# Patient Record
Sex: MALE | Race: WHITE | NOT HISPANIC OR LATINO | Employment: OTHER | URBAN - NONMETROPOLITAN AREA
[De-identification: names, ages, dates, MRNs, and addresses within clinical notes are randomized per-mention and may not be internally consistent; named-entity substitution may affect disease eponyms.]

---

## 2017-01-03 ENCOUNTER — OFFICE VISIT (OUTPATIENT)
Dept: SURGERY | Facility: CLINIC | Age: 67
End: 2017-01-03

## 2017-01-03 VITALS
HEIGHT: 69 IN | SYSTOLIC BLOOD PRESSURE: 198 MMHG | DIASTOLIC BLOOD PRESSURE: 90 MMHG | BODY MASS INDEX: 34.21 KG/M2 | WEIGHT: 231 LBS

## 2017-01-03 DIAGNOSIS — R22.9 MASS OF SKIN: Primary | ICD-10-CM

## 2017-01-03 DIAGNOSIS — R22.9 MASS OF SKIN: ICD-10-CM

## 2017-01-03 PROCEDURE — 99202 OFFICE O/P NEW SF 15 MIN: CPT | Performed by: SURGERY

## 2017-01-03 PROCEDURE — 11402 EXC TR-EXT B9+MARG 1.1-2 CM: CPT | Performed by: SURGERY

## 2017-01-03 NOTE — PROGRESS NOTES
CHIEF COMPLAINT:    Skin cyst on chest    HISTORY OF PRESENT ILLNESS:    Kamlesh Moser is a 66 y.o. male who presents with an enlarging cyst on the mid-chest.  He has had it for more than three years.  It has required I and D previously for infection and appeared to have nearly resolved.  He has noticed it again for about the last 18 months.  It has gotten larger.  It causes no pain.  He would like it removed given his prior infection and its enlargement.    Past Medical History   Diagnosis Date   • Acute maxillary sinusitis    • Allergic rhinitis       Moderately severe      • Arthritis    • Arthritis    • Atopic conjunctivitis    • Benign prostatic hyperplasia    • Bilateral hearing loss    • Cellulitis      CHESTWAL   • Chronic otitis externa    • Common cold    • Diabetes    • Diverticular disease of colon    • Dyspnea    • Essential hypertension    • Fracture of pelvis    • Fracture of wrist    • Fracture, radius    • Fracture, ulna    • GERD (gastroesophageal reflux disease)    • GERD (gastroesophageal reflux disease)    • Hiatal hernia    • Hiatal hernia    • Hyperlipidemia    • Hyperlipidemia    • Hypertension    • Hypertension    • Hypertensive disorder    • Immunization due    • Inflamed seborrheic keratosis    • Influenza vaccination given      Influenza vaccination   12/10/2013   • Low back pain    • Need for immunization against influenza    • Need for prophylactic vaccination against Streptococcus pneumoniae (pneumococcus)    • Need for prophylactic vaccination with combined diphtheria-tetanus-pertussis (DTP) vaccine      Need for prophylactic vaccination and inoculation against Diptheria-tetanus-pertussis, combined [DTP] [DTaP]     • Nuclear senile cataract      moderate   • Onychomycosis    • Osteoarthritis of multiple joints    • Primary open angle glaucoma      Right eye IOP too high s/p LT      • Rhinitis    • Screening for malignant neoplasm of colon    • Screening for malignant neoplasm of  prostate    • Shoulder pain    • Temporomandibular joint disorder    • Vitamin D deficiency        Past Surgical History   Procedure Laterality Date   • Knee surgery     • Back surgery     • Wrist surgery     • Cardiac catheterization  02/08/2011     Symptoms of angina that have resolved with normalization of blood pressure. Very mild nonobstructive coronary artery disease. Normal systolic function.   • Endoscopy and colonoscopy  01/16/2012     x2, Diverticulosis in sigmoid colon. Hemorrhoids in anus.   • Cryotherapy  06/07/2013     CRYOTHERAPY OF ACNE   • Endoscopy w/ peg tube placement  04/12/2002     EGD w/ tube: Dysphagia, Esophagitis, Hiatal hernia, Duodenitis.   • Other surgical history  09/11/1996     Forearm or wrist surgery: x2, Removal of K-wires, left radius    • Incision and drainage abscess  04/10/2013     I&D, Simple    • Other surgical history  04/25/1986     Hand/finger surgery: Repair radial collateral ligament, I&D, right fifth finger. Repair laceration.   • Injection of medication  05/25/2016     Kenalog x3   • Knee surgery  05/12/2003     x3, Arthroscopic medial meniscectomy, right knee.   • Refractive surgery  12/29/2014     LASER SURGERY OF EYE: x2   • Back surgery  1982     Low back disk surgery    • Proctosigmoidoscopy  04/23/1982     Proctosigmoidoscopy, Rigid: Normal         Current Outpatient Prescriptions:   •  acetic acid-hydrocortisone (VOSOL-HC) 1-2 % otic solution, 2 (two) times a day., Disp: , Rfl:   •  aspirin 325 MG tablet, Take 325 mg by mouth daily., Disp: , Rfl:   •  AZOPT 1 % ophthalmic suspension, Administer 1 drop to both eyes 3 (Three) Times a Day., Disp: 10 mL, Rfl: 5  •  finasteride (PROSCAR) 5 MG tablet, Take 1 tablet by mouth Daily., Disp: 30 tablet, Rfl: 3  •  fluticasone (FLONASE) 50 MCG/ACT nasal spray, 2 sprays into each nostril Daily., Disp: , Rfl:   •  montelukast (SINGULAIR) 10 MG tablet, TAKE 1 TABLET EVERY NIGHT AT BEDTIME., Disp: 30 tablet, Rfl: 5  •   omeprazole (priLOSEC) 20 MG capsule, , Disp: , Rfl:   •  pravastatin (PRAVACHOL) 40 MG tablet, , Disp: , Rfl:   •  tamsulosin (FLOMAX) 0.4 MG capsule 24 hr capsule, TAKE 1 CAPSULE DAILY, Disp: 30 capsule, Rfl: 7  •  triamterene-hydrochlorothiazide (MAXZIDE-25) 37.5-25 MG per tablet, , Disp: , Rfl:     Allergies   Allergen Reactions   • Penicillins        Family History   Problem Relation Age of Onset   • Diabetes Mother    • Heart disease Mother    • Diabetes Father    • Heart disease Father    • Diabetes Other    • Heart disease Other    • Hypertension Other        Social History     Social History   • Marital status:      Spouse name: N/A   • Number of children: N/A   • Years of education: N/A     Occupational History   • Not on file.     Social History Main Topics   • Smoking status: Former Smoker   • Smokeless tobacco: Never Used   • Alcohol use Yes      Comment: social   • Drug use: No   • Sexual activity: Defer     Other Topics Concern   • Not on file     Social History Narrative       Review of Systems   Constitutional: Negative for activity change, appetite change, chills and fever.   HENT: Negative for hearing loss, nosebleeds and trouble swallowing.    Cardiovascular: Negative for chest pain, palpitations and leg swelling.   Gastrointestinal: Negative for abdominal distention, abdominal pain, anal bleeding, blood in stool, constipation, diarrhea, nausea, rectal pain and vomiting.   Endocrine: Negative for cold intolerance, heat intolerance, polydipsia and polyuria.   Genitourinary: Negative for decreased urine volume, difficulty urinating, dysuria, enuresis, frequency, hematuria and urgency.   Musculoskeletal: Negative for arthralgias, back pain, gait problem, myalgias and neck pain.   Skin: Negative for pallor, rash and wound.   Allergic/Immunologic: Negative for immunocompromised state.   Neurological: Negative for dizziness, seizures, weakness, light-headedness, numbness and headaches.  "  Psychiatric/Behavioral: Negative for agitation and behavioral problems. The patient is not nervous/anxious.        Objective     Visit Vitals   • BP (!) 198/90   • Ht 69\" (175.3 cm)   • Wt 231 lb (105 kg)   • BMI 34.11 kg/m2       Physical Exam   Constitutional: He is oriented to person, place, and time. He appears well-developed and well-nourished.   HENT:   Head: Normocephalic and atraumatic.   Nose: Nose normal.   Eyes: Conjunctivae and EOM are normal. Right eye exhibits no discharge. Left eye exhibits no discharge.   Neck: Trachea normal, normal range of motion and phonation normal. Neck supple. No JVD present. No tracheal deviation and no edema present. No thyromegaly present.   Cardiovascular: Normal rate, regular rhythm and normal heart sounds.  Exam reveals no gallop and no friction rub.    No murmur heard.  Pulmonary/Chest: Effort normal and breath sounds normal. No accessory muscle usage. No respiratory distress. He has no decreased breath sounds. He has no wheezes. He has no rales. He exhibits no tenderness.       Abdominal: Soft. He exhibits no distension, no fluid wave, no ascites, no pulsatile midline mass and no mass. There is no tenderness. There is no rebound and no guarding. No hernia.   Musculoskeletal: Normal range of motion. He exhibits no edema, tenderness or deformity.   Lymphadenopathy:     He has no cervical adenopathy.        Left: No supraclavicular adenopathy present.   Neurological: He is alert and oriented to person, place, and time. He has normal strength. No cranial nerve deficit.   Skin: Skin is warm and dry. No rash noted. He is not diaphoretic. No erythema. No pallor.   Psychiatric: He has a normal mood and affect. His speech is normal and behavior is normal. Judgment and thought content normal. Cognition and memory are normal.   Vitals reviewed.      Procedure     Procedure Performed: Removal of 2cm Skin Cyst from Midchest  Preop Dx: Sebaceous Cyst  Postop Dx: Same  Surgeon: " Mary Kay  Assistant: ASTRID Chavarria  EBL: <5cc  Specimen: Skin Cyst    Note: Site was identified by the patient.  Consent was obtained.  Timeout was performed.  The area was prepped and draped in normal sterile fashion.  Local anesthetic was injected in a field block. Elliptical skin incision was made overlying the cyst.  Full-thickness skin was then removed and the cyst wall was dissected free of the surrounding tissue and removed with it.  Hemostasis was achieved.  The skin was then closed.  Sterile dressings were placed.      ASSESSMENT:    Sebacoues Cyst    PLAN:    Cyst was removed today.  Will see in one week to recheck incision site.

## 2017-01-03 NOTE — MR AVS SNAPSHOT
Kamlesh Moser   1/3/2017 10:30 AM   Office Visit    Dept Phone:  [phone]   Encounter #:  [id number]    Provider:  Elias Muñiz MD   Department:  [facility] GENERAL SURGERY                Your Full Care Plan              Your Updated Medication List          This list is accurate as of: 1/3/17 11:22 AM.  Always use your most recent med list.                acetic acid-hydrocortisone 1-2 % otic solution   Commonly known as:  VOSOL-HC       aspirin 325 MG tablet       AZOPT 1 % ophthalmic suspension   Generic drug:  brinzolamide   Administer 1 drop to both eyes 3 (Three) Times a Day.       finasteride 5 MG tablet   Commonly known as:  PROSCAR   Take 1 tablet by mouth Daily.       fluticasone 50 MCG/ACT nasal spray   Commonly known as:  FLONASE       montelukast 10 MG tablet   Commonly known as:  SINGULAIR   TAKE 1 TABLET EVERY NIGHT AT BEDTIME.       omeprazole 20 MG capsule   Commonly known as:  priLOSEC       pravastatin 40 MG tablet   Commonly known as:  PRAVACHOL       tamsulosin 0.4 MG capsule 24 hr capsule   Commonly known as:  FLOMAX   TAKE 1 CAPSULE DAILY       triamterene-hydrochlorothiazide 37.5-25 MG per tablet   Commonly known as:  MAXZIDE-25               Instructions     None    Patient Instructions History      Upcoming Appointments     Visit Type Date Time Department    NEW PATIENT 1/3/2017 10:30 AM [facility] [place] [facility]    NEW PATIENT 2/14/2017 10:15 AM [facility] GASTROENT  [facility]    OFFICE VISIT 3/14/2017 10:30 AM [facility] [place] [facility]    OFFICE VISIT 4/12/2017  9:00 AM [facility] [place] [facility]    OFFICE VISIT 6/16/2017  8:00 AM [facility] MED [facility] 4TH      MyChart Signup     Our records indicate that you have declined [facility] signup. If you would like to sign up for [employer], please email [facility] [email or web address] or call [phone] to obtain an activation code.             Other Info from Your Visit           Your Appointments     "Feb 14, 2017 10:15 AM CST   New Patient with CHELY Chowdhury   [facility] GASTROENTEROLOGY (--)    [address] [place] [address] [address]  [facility] [address] [place] [id number] [phone]           Bring all previous medical records and films, along with current medications and insurance information.            Mar 14, 2017 10:30 AM CDT   Office Visit with Mack Yeager MD   [facility] OTOLARYNGOLOGY (--)    [address] [place] [address] [address]  [facility] [address] [place] [id number] [phone]           Arrive 15 minutes prior to appointment.            Apr 12, 2017  9:00 AM CDT   Office Visit with Alan Deluca MD   [facility] OPHTHALMOLOGY (--)    [address] [place] [address] [address]  [facility] [address] [place] [id number] [phone]           Arrive 15 minutes prior to appointment.            Jun 16, 2017  8:00 AM CDT   Office Visit with Rafa Vargas MD   [facility] FAMILY MEDICINE (--)    [address] [place] [address] [address]  [facility] [address] [place] [id number] [phone]           Arrive 15 minutes prior to appointment.              Allergies     Penicillins        Reason for Visit     Cyst Cyst on chest      Vital Signs     Blood Pressure Height Weight Body Mass Index Smoking Status       198/90 69\" (175.3 cm) 231 lb (105 kg) 34.11 kg/m2 Former Smoker         "

## 2017-01-04 ENCOUNTER — HOSPITAL ENCOUNTER (OUTPATIENT)
Dept: OTHER | Facility: HOSPITAL | Age: 67
Discharge: HOME | End: 2017-01-04
Attending: FAMILY MEDICINE | Admitting: FAMILY MEDICINE

## 2017-01-10 ENCOUNTER — OFFICE VISIT (OUTPATIENT)
Dept: SURGERY | Facility: CLINIC | Age: 67
End: 2017-01-10

## 2017-01-10 VITALS
SYSTOLIC BLOOD PRESSURE: 160 MMHG | BODY MASS INDEX: 33.92 KG/M2 | DIASTOLIC BLOOD PRESSURE: 92 MMHG | HEIGHT: 69 IN | WEIGHT: 229 LBS

## 2017-01-10 DIAGNOSIS — Z09 FOLLOW UP: Primary | ICD-10-CM

## 2017-01-10 PROCEDURE — 99024 POSTOP FOLLOW-UP VISIT: CPT | Performed by: SURGERY

## 2017-01-11 RX ORDER — MELOXICAM 15 MG/1
TABLET ORAL
Qty: 30 TABLET | Refills: 2 | Status: SHIPPED | OUTPATIENT
Start: 2017-01-11 | End: 2017-02-10 | Stop reason: SDUPTHER

## 2017-01-12 NOTE — PROGRESS NOTES
CHIEF COMPLAINT:    Chief Complaint   Patient presents with   • Follow-up     1 wk. ben- S/P ec. of cyst on chest       HISTORY OF PRESENT ILLNESS:    Kamlesh Moser is a 66 y.o. male who underwent cyst excision from his chest 1 week ago.  Pathology showed a cyst. He has no complaints today.    EXAM:    Well healed chest incision with minimal bruising.    ASSESSMENT:    S/p Cyst excision    PLAN:    Follow up prn.

## 2017-01-23 RX ORDER — OMEPRAZOLE 20 MG/1
CAPSULE, DELAYED RELEASE ORAL
Qty: 30 CAPSULE | Refills: 10 | Status: SHIPPED | OUTPATIENT
Start: 2017-01-23 | End: 2018-01-31 | Stop reason: SDUPTHER

## 2017-02-10 RX ORDER — MELOXICAM 15 MG/1
TABLET ORAL
Qty: 30 TABLET | Refills: 0 | Status: SHIPPED | OUTPATIENT
Start: 2017-02-10 | End: 2017-04-24 | Stop reason: SDUPTHER

## 2017-03-14 ENCOUNTER — OFFICE VISIT (OUTPATIENT)
Dept: OTOLARYNGOLOGY | Facility: CLINIC | Age: 67
End: 2017-03-14

## 2017-03-14 VITALS — BODY MASS INDEX: 34.8 KG/M2 | HEIGHT: 69 IN | WEIGHT: 235 LBS | TEMPERATURE: 98.5 F

## 2017-03-14 DIAGNOSIS — H92.02 OTALGIA, LEFT: ICD-10-CM

## 2017-03-14 DIAGNOSIS — H60.63 CHRONIC NON-INFECTIVE OTITIS EXTERNA OF BOTH EARS, UNSPECIFIED TYPE: Primary | ICD-10-CM

## 2017-03-14 PROCEDURE — 99214 OFFICE O/P EST MOD 30 MIN: CPT | Performed by: OTOLARYNGOLOGY

## 2017-03-14 RX ORDER — FLUOCINOLONE ACETONIDE 0.1 MG/ML
SOLUTION TOPICAL 2 TIMES DAILY
Qty: 90 ML | Refills: 0 | Status: SHIPPED | OUTPATIENT
Start: 2017-03-14 | End: 2019-12-18

## 2017-03-14 NOTE — PROGRESS NOTES
Subjective   Kamlesh Moser is a 66 y.o. male.       History of Present Illness   Patient wears hearing aids.  Also has chronic otitis externa.  Has chronic allergic rhinitis which is managed with Singulair and Dr. Vargas prescribes this for him.  Reports his ears have been itching but he uses Synalar as needed and this seems to help.  Complains of left-sided ear pain that is made worse when he sleeps at night because he sleeps on his left side.  No otorrhea.      The following portions of the patient's history were reviewed and updated as appropriate: allergies, current medications, past family history, past medical history, past social history, past surgical history and problem list.     reports that he has quit smoking. He has never used smokeless tobacco. He reports that he drinks alcohol. He reports that he does not use illicit drugs.   Patient is not a tobacco user and has not been counseled for use of tobacco products      Review of Systems   Constitutional: Negative for fever.   HENT: Positive for ear pain.            Objective   Physical Exam  Ears: External ears no deformity.  Canals show modest amount of squamous debris that is cleaned under the microscope using cerumen loops and suction.  Tympanic membranes are intact and clear.  Nose: Nares show no discharge mass polyp or purulence.  Boggy mucosa is present.  No gross external deformity.   Oral cavity: Lips and gums without lesions.  Tongue and floor of mouth without lesions.  Parotid and submandibular ducts unobstructed.  No mucosal lesions on the buccal mucosa or vestibule of the mouth.  Pharynx: No erythema or exudate  Neck: No lymphadenopathy.  No thyromegaly.  Trachea and larynx midline.  No masses in the parotid or submandibular glands.  Left TMJ is tender to palpation    Assessment/Plan   Kamlesh was seen today for follow-up and earache.    Diagnoses and all orders for this visit:    Chronic non-infective otitis externa of both ears,  unspecified type    Otalgia, left        Plan: Ears cleaned as described above.    Prescription for Synalar sent electronically to his pharmacy.  Avoid manipulation of his ears.  Explain that his otalgia was related to inflammation of his temporomandibular joint due to positioning during sleep.  Advised soft diet, use of anti-inflammatory medicines if not medically contraindicated, and attempting to position himself differently to avoid pressure on his jaw while sleeping.  Return in a year or as needed for problems.

## 2017-04-18 ENCOUNTER — OFFICE VISIT (OUTPATIENT)
Dept: OPHTHALMOLOGY | Facility: CLINIC | Age: 67
End: 2017-04-18

## 2017-04-18 DIAGNOSIS — H40.1131 PRIMARY OPEN ANGLE GLAUCOMA OF BOTH EYES, MILD STAGE: Primary | ICD-10-CM

## 2017-04-18 DIAGNOSIS — H52.203 ASTIGMATISM, BILATERAL: ICD-10-CM

## 2017-04-18 DIAGNOSIS — H26.9 CATARACT: ICD-10-CM

## 2017-04-18 PROCEDURE — 92133 CPTRZD OPH DX IMG PST SGM ON: CPT | Performed by: OPHTHALMOLOGY

## 2017-04-18 PROCEDURE — 99214 OFFICE O/P EST MOD 30 MIN: CPT | Performed by: OPHTHALMOLOGY

## 2017-04-18 PROCEDURE — 76514 ECHO EXAM OF EYE THICKNESS: CPT | Performed by: OPHTHALMOLOGY

## 2017-04-18 RX ORDER — BRINZOLAMIDE 1 %
1 SUSPENSION, DROPS(FINAL DOSAGE FORM)(ML) OPHTHALMIC (EYE) 3 TIMES DAILY
Qty: 10 ML | Refills: 5 | Status: SHIPPED | OUTPATIENT
Start: 2017-04-18 | End: 2022-04-28

## 2017-04-18 NOTE — PROGRESS NOTES
Subjective   Kamlesh Moser is a 66 y.o. male.   Chief Complaint   Patient presents with   • Cataract     Thanks vision is becoming worse in the right eye more so than the left eye.   • Glaucoma     Takes Azopt at 6 AM 1 PM and 9 PM.     HPI     Cataract    Additional comments: Thanks vision is becoming worse in the right eye more so than the left eye.           Glaucoma   In both eyes.  Treatment compliance is misses drops infrequently. Additional comments: Takes Azopt at 6 AM 1 PM and 9 PM.       Last edited by Elaine Lopez MD on 4/18/2017  9:45 AM. (History)          Review of Systems   Constitutional: Negative for chills and fever.   Respiratory: Negative for shortness of breath.    Cardiovascular: Negative for chest pain.   Gastrointestinal: Negative for abdominal pain.   Genitourinary: Negative for dysuria.   Musculoskeletal: Negative for myalgias.   Psychiatric/Behavioral: Negative for confusion.       Objective   Base Eye Exam     Visual Acuity (Snellen - Linear)      Right Left   Dist cc 20/50 -1 20/30 -2   Near cc J2 J1+       Correction:  Glasses      Tonometry (Applanation, 9:38 AM)      Right Left   Pressure 16 14         Pachymetry (4/18/2017)      Right Left   Thickness 504 506         Pupils      Pupils   Right PERRL   Left PERRL         Visual Fields      Left Right   Result Full          Extraocular Movement      Right Left   Result Full Full         Neuro/Psych     Oriented x3:  Yes      Dilation     Both eyes:  1.0% Mydriacyl, 2.5% Lonnie Synephrine @ 9:42 AM            Slit Lamp and Fundus Exam     External Exam      Right Left    External Normal Normal      Slit Lamp Exam      Right Left    Lids/Lashes Dermatochalasis - upper lid Dermatochalasis - upper lid    Conjunctiva/Sclera White and quiet White and quiet    Cornea arcus arcus    Anterior Chamber Deep and quiet Deep and quiet    Iris Round and reactive Round and reactive    Lens Nuclear sclerosis Nuclear sclerosis    Vitreous  Normal Normal      Fundus Exam      Right Left    Disc Peripapillary atrophy Peripapillary atrophy    C/D Ratio 0.5-0.55 0.4-0.45    Macula Normal Normal    Vessels Normal Normal    Periphery Normal Normal            Refraction     Wearing Rx      Sphere Cylinder Axis Add   Right -1.50 +0.75 169 +2.75   Left Dale +0.75 009 +2.75               OCT Disc:   OD- superior and inferior wedge thinning total RNFL thickness wnl  OS- normal    Assessment/Plan   Diagnoses and all orders for this visit:    Primary open angle glaucoma of both eyes, mild stage  Comments:  Continue AzoptTID OU. ?Preperimetric follow up in 3 months for OCT of the ON HVF and IOP check.     Cataract  Comments:  Continue to observe. No visual complaints.     Astigmatism, bilateral  Comments:  Continue current Rx    Other orders  -     AZOPT 1 % ophthalmic suspension; Administer 1 drop to both eyes 3 (Three) Times a Day.    Plan:       Return in about 3 months (around 7/18/2017) for IOP check, OCT discs, Visual Field 24-2.                            This document has been signed by Elaine Lopez MD on April 18, 2017 10:50 AM

## 2017-04-20 RX ORDER — MONTELUKAST SODIUM 10 MG/1
10 TABLET ORAL NIGHTLY
Qty: 30 TABLET | Refills: 5 | Status: SHIPPED | OUTPATIENT
Start: 2017-04-20 | End: 2017-10-23 | Stop reason: SDUPTHER

## 2017-04-24 ENCOUNTER — TELEPHONE (OUTPATIENT)
Dept: FAMILY MEDICINE CLINIC | Facility: CLINIC | Age: 67
End: 2017-04-24

## 2017-04-24 RX ORDER — MELOXICAM 15 MG/1
15 TABLET ORAL DAILY
Qty: 30 TABLET | Refills: 3 | Status: SHIPPED | OUTPATIENT
Start: 2017-04-24 | End: 2017-08-18 | Stop reason: SDUPTHER

## 2017-04-24 NOTE — TELEPHONE ENCOUNTER
-Requested Refills Sent    ---- Message from Evelin Rangel sent at 4/24/2017  9:12 AM CDT -----  Regarding: NICOLE GONZALES  Contact: [phone]  ANTONIETTA GONCALVES IS NEEDING REFILL ON MOBIC TO BE SENT TO [facility]

## 2017-06-16 ENCOUNTER — TELEPHONE (OUTPATIENT)
Dept: FAMILY MEDICINE CLINIC | Facility: CLINIC | Age: 67
End: 2017-06-16

## 2017-06-16 ENCOUNTER — LAB (OUTPATIENT)
Dept: LAB | Facility: HOSPITAL | Age: 67
End: 2017-06-16

## 2017-06-16 ENCOUNTER — OFFICE VISIT (OUTPATIENT)
Dept: FAMILY MEDICINE CLINIC | Facility: CLINIC | Age: 67
End: 2017-06-16

## 2017-06-16 VITALS
HEART RATE: 98 BPM | HEIGHT: 69 IN | BODY MASS INDEX: 33.72 KG/M2 | OXYGEN SATURATION: 99 % | WEIGHT: 227.7 LBS | SYSTOLIC BLOOD PRESSURE: 148 MMHG | DIASTOLIC BLOOD PRESSURE: 90 MMHG

## 2017-06-16 DIAGNOSIS — K21.9 GASTROESOPHAGEAL REFLUX DISEASE WITHOUT ESOPHAGITIS: ICD-10-CM

## 2017-06-16 DIAGNOSIS — M79.605 PAIN OF LEFT LOWER EXTREMITY: Primary | ICD-10-CM

## 2017-06-16 DIAGNOSIS — Z79.899 HIGH RISK MEDICATION USE: ICD-10-CM

## 2017-06-16 DIAGNOSIS — H65.06 RECURRENT ACUTE SEROUS OTITIS MEDIA OF BOTH EARS: ICD-10-CM

## 2017-06-16 DIAGNOSIS — E78.00 PURE HYPERCHOLESTEROLEMIA: ICD-10-CM

## 2017-06-16 DIAGNOSIS — I10 ESSENTIAL HYPERTENSION: ICD-10-CM

## 2017-06-16 DIAGNOSIS — R74.01 ELEVATED AST (SGOT): Primary | ICD-10-CM

## 2017-06-16 LAB
ALBUMIN SERPL-MCNC: 4.3 G/DL (ref 3.4–4.8)
ALBUMIN/GLOB SERPL: 1.4 G/DL (ref 1.1–1.8)
ALP SERPL-CCNC: 68 U/L (ref 38–126)
ALT SERPL W P-5'-P-CCNC: 62 U/L (ref 21–72)
ANION GAP SERPL CALCULATED.3IONS-SCNC: 12 MMOL/L (ref 5–15)
ARTICHOKE IGE QN: 82 MG/DL (ref 1–129)
AST SERPL-CCNC: 65 U/L (ref 17–59)
BILIRUB SERPL-MCNC: 0.5 MG/DL (ref 0.2–1.3)
BUN BLD-MCNC: 16 MG/DL (ref 7–21)
BUN/CREAT SERPL: 15.5 (ref 7–25)
CALCIUM SPEC-SCNC: 9.3 MG/DL (ref 8.4–10.2)
CHLORIDE SERPL-SCNC: 99 MMOL/L (ref 95–110)
CHOLEST SERPL-MCNC: 156 MG/DL (ref 0–199)
CO2 SERPL-SCNC: 26 MMOL/L (ref 22–31)
CREAT BLD-MCNC: 1.03 MG/DL (ref 0.7–1.3)
GFR SERPL CREATININE-BSD FRML MDRD: 72 ML/MIN/1.73 (ref 49–113)
GLOBULIN UR ELPH-MCNC: 3.1 GM/DL (ref 2.3–3.5)
GLUCOSE BLD-MCNC: 91 MG/DL (ref 60–100)
HDLC SERPL-MCNC: 50 MG/DL (ref 60–200)
LDLC/HDLC SERPL: 1.19 {RATIO} (ref 0–3.55)
MAGNESIUM SERPL-MCNC: 2.1 MG/DL (ref 1.6–2.3)
POTASSIUM BLD-SCNC: 4 MMOL/L (ref 3.5–5.1)
PROT SERPL-MCNC: 7.4 G/DL (ref 6.3–8.6)
SODIUM BLD-SCNC: 137 MMOL/L (ref 137–145)
TRIGL SERPL-MCNC: 232 MG/DL (ref 20–199)
VIT B12 BLD-MCNC: 777 PG/ML (ref 239–931)

## 2017-06-16 PROCEDURE — 82607 VITAMIN B-12: CPT | Performed by: FAMILY MEDICINE

## 2017-06-16 PROCEDURE — 96372 THER/PROPH/DIAG INJ SC/IM: CPT | Performed by: FAMILY MEDICINE

## 2017-06-16 PROCEDURE — 99214 OFFICE O/P EST MOD 30 MIN: CPT | Performed by: FAMILY MEDICINE

## 2017-06-16 PROCEDURE — 83735 ASSAY OF MAGNESIUM: CPT | Performed by: FAMILY MEDICINE

## 2017-06-16 PROCEDURE — 36415 COLL VENOUS BLD VENIPUNCTURE: CPT | Performed by: FAMILY MEDICINE

## 2017-06-16 PROCEDURE — 80061 LIPID PANEL: CPT | Performed by: FAMILY MEDICINE

## 2017-06-16 PROCEDURE — 80053 COMPREHEN METABOLIC PANEL: CPT | Performed by: FAMILY MEDICINE

## 2017-06-16 RX ORDER — TRIAMCINOLONE ACETONIDE 40 MG/ML
80 INJECTION, SUSPENSION INTRA-ARTICULAR; INTRAMUSCULAR ONCE
Status: COMPLETED | OUTPATIENT
Start: 2017-06-16 | End: 2017-06-16

## 2017-06-16 RX ADMIN — TRIAMCINOLONE ACETONIDE 80 MG: 40 INJECTION, SUSPENSION INTRA-ARTICULAR; INTRAMUSCULAR at 08:30

## 2017-06-16 NOTE — PROGRESS NOTES
Subjective   Kamlesh Moser is a 66 y.o. male.     Hypertension   This is a chronic problem. The problem has been waxing and waning since onset. The problem is controlled (fatigue if BP lower). Pertinent negatives include no chest pain, orthopnea, palpitations, peripheral edema or PND.   Hyperlipidemia   This is a chronic problem. The problem is controlled. Recent lipid tests were reviewed and are normal. Associated symptoms include myalgias. Pertinent negatives include no chest pain.   Heartburn   He complains of coughing. He reports no chest pain or no heartburn.   URI    This is a recurrent problem. The current episode started in the past 7 days. The problem has been waxing and waning. There has been no fever. Associated symptoms include congestion and coughing. Pertinent negatives include no chest pain, rash or rhinorrhea.   Leg Swelling   This is a new problem. The current episode started more than 1 month ago. The problem occurs constantly. The problem has been gradually improving. Associated symptoms include congestion, coughing and myalgias. Pertinent negatives include no chest pain or rash.        The following portions of the patient's history were reviewed and updated as appropriate: allergies, current medications, past family history, past medical history, past social history, past surgical history and problem list.    Review of Systems   HENT: Positive for congestion. Negative for rhinorrhea.    Eyes: Negative for discharge and itching.        Tre eye drops   Respiratory: Positive for cough.    Cardiovascular: Negative for chest pain, palpitations, orthopnea and PND.   Gastrointestinal: Negative for heartburn.   Musculoskeletal: Positive for myalgias.   Skin: Positive for wound. Negative for color change, pallor and rash.       Objective   Physical Exam   Constitutional: He is oriented to person, place, and time. He appears well-developed and well-nourished. No distress.   HENT:   Head:  Normocephalic and atraumatic.   Right Ear: Tympanic membrane is retracted. Tympanic membrane is not perforated and not erythematous.   Left Ear: Tympanic membrane is not perforated, not erythematous and not retracted.   Nose: Mucosal edema and rhinorrhea present.   Mouth/Throat: Uvula is midline, oropharynx is clear and moist and mucous membranes are normal.   Cardiovascular: Normal rate, regular rhythm, normal heart sounds and intact distal pulses.  Exam reveals no gallop and no friction rub.    No murmur heard.  Pulmonary/Chest: Effort normal and breath sounds normal. No respiratory distress. He has no wheezes. He has no rales. He exhibits no tenderness.   Musculoskeletal: He exhibits edema and tenderness.        Legs:  Lymphadenopathy:     He has no cervical adenopathy.   Neurological: He is alert and oriented to person, place, and time.   Skin: Skin is warm and dry. No lesion noted. He is not diaphoretic.   Psychiatric: He has a normal mood and affect. His behavior is normal. Judgment and thought content normal.   Nursing note and vitals reviewed.      Assessment/Plan   Problems Addressed this Visit        Cardiovascular and Mediastinum    Hyperlipidemia    Relevant Orders    Lipid Panel    Hypertensive disorder    Relevant Orders    Comprehensive Metabolic Panel       Digestive    GERD (gastroesophageal reflux disease)    Relevant Orders    Vitamin B12    Magnesium      Other Visit Diagnoses     Pain of left lower extremity    -  Primary    Relevant Orders    Duplex Venous Lower Extremity - Left CAR    Recurrent acute serous otitis media of both ears        High risk medication use        Relevant Orders    Vitamin B12    Magnesium               left leg swelling on mission trip left and gradually improving

## 2017-06-16 NOTE — PROGRESS NOTES
Pr Dr. Vargas, Mr. Moser has been called with his recent lab results & recommendations.  Continue his current medications and follow-up as planned or sooner if any problems.

## 2017-06-16 NOTE — PROGRESS NOTES
Okay of the AST is elevated on CMP.  Recommend also in the liver and hepatitis panel minuses hepatitis C.

## 2017-06-16 NOTE — TELEPHONE ENCOUNTER
Pr Dr. Vargas, Mr. Moser has been called with his recent lab results & recommendations.  Continue his current medications and follow-up as planned or sooner if any problems.      Lab and US of the Liver Ordered

## 2017-06-16 NOTE — TELEPHONE ENCOUNTER
----- Message from Rafa Vargas MD sent at 6/16/2017  1:25 PM CDT -----  Okay of the AST is elevated on CMP.  Recommend also in the liver and hepatitis panel minuses hepatitis C.

## 2017-06-20 ENCOUNTER — LAB (OUTPATIENT)
Dept: LAB | Facility: HOSPITAL | Age: 67
End: 2017-06-20

## 2017-06-20 DIAGNOSIS — R74.01 ELEVATED AST (SGOT): ICD-10-CM

## 2017-06-20 PROCEDURE — 80074 ACUTE HEPATITIS PANEL: CPT | Performed by: FAMILY MEDICINE

## 2017-06-20 PROCEDURE — 36415 COLL VENOUS BLD VENIPUNCTURE: CPT

## 2017-06-21 LAB
HAV IGM SERPL QL IA: NEGATIVE
HBV CORE IGM SERPL QL IA: NEGATIVE
HBV SURFACE AG SERPL QL IA: NEGATIVE
HCV AB SER DONR QL: NEGATIVE

## 2017-06-22 ENCOUNTER — TELEPHONE (OUTPATIENT)
Dept: FAMILY MEDICINE CLINIC | Facility: CLINIC | Age: 67
End: 2017-06-22

## 2017-06-22 NOTE — TELEPHONE ENCOUNTER
Spoke with patient. Relayed results and recommendations per Dr. Vargas.  Pt verbalized understanding.

## 2017-06-23 ENCOUNTER — HOSPITAL ENCOUNTER (OUTPATIENT)
Dept: ULTRASOUND IMAGING | Facility: HOSPITAL | Age: 67
Discharge: HOME | End: 2017-06-23
Attending: FAMILY MEDICINE | Admitting: FAMILY MEDICINE

## 2017-06-23 DIAGNOSIS — R74.01 ELEVATED AST (SGOT): ICD-10-CM

## 2017-06-23 PROBLEM — K76.0 FATTY LIVER: Status: ACTIVE | Noted: 2017-06-23

## 2017-06-23 PROBLEM — K80.20 GALLSTONES: Status: ACTIVE | Noted: 2017-06-23

## 2017-06-23 PROCEDURE — 76705 ECHO EXAM OF ABDOMEN: CPT

## 2017-06-23 NOTE — PROGRESS NOTES
He has a small cyst in his liver and his kidney both.  Also has fatty infiltration of the liver.  Also noted incidental gallstones.  At this point recommend weight loss and if he has problem with pain after eating in his right upper quadrant of his stomach let us know immediately.

## 2017-07-09 LAB
BH CV LOWER VASCULAR LEFT COMMON FEMORAL AUGMENT: NORMAL
BH CV LOWER VASCULAR LEFT COMMON FEMORAL COMPETENT: NORMAL
BH CV LOWER VASCULAR LEFT COMMON FEMORAL COMPRESS: NORMAL
BH CV LOWER VASCULAR LEFT DISTAL FEMORAL COMPRESS: NORMAL
BH CV LOWER VASCULAR LEFT GREATER SAPH AK AUGMENT: NORMAL
BH CV LOWER VASCULAR LEFT GREATER SAPH AK COMPETENT: NORMAL
BH CV LOWER VASCULAR LEFT GREATER SAPH AK COMPRESS: NORMAL
BH CV LOWER VASCULAR LEFT MID FEMORAL AUGMENT: NORMAL
BH CV LOWER VASCULAR LEFT MID FEMORAL COMPETENT: NORMAL
BH CV LOWER VASCULAR LEFT MID FEMORAL COMPRESS: NORMAL
BH CV LOWER VASCULAR LEFT PERONEAL AUGMENT: NORMAL
BH CV LOWER VASCULAR LEFT POPLITEAL AUGMENT: NORMAL
BH CV LOWER VASCULAR LEFT POPLITEAL COMPETENT: NORMAL
BH CV LOWER VASCULAR LEFT POPLITEAL COMPRESS: NORMAL
BH CV LOWER VASCULAR LEFT POSTERIOR TIBIAL AUGMENT: NORMAL
BH CV LOWER VASCULAR LEFT POSTERIOR TIBIAL COMPRESS: NORMAL
BH CV LOWER VASCULAR LEFT PROFUNDA FEMORAL AUGMENT: NORMAL
BH CV LOWER VASCULAR LEFT PROFUNDA FEMORAL COMPRESS: NORMAL
BH CV LOWER VASCULAR LEFT PROXIMAL FEMORAL AUGMENT: NORMAL
BH CV LOWER VASCULAR LEFT PROXIMAL FEMORAL COMPETENT: NORMAL
BH CV LOWER VASCULAR LEFT PROXIMAL FEMORAL COMPRESS: NORMAL

## 2017-07-17 ENCOUNTER — OFFICE VISIT (OUTPATIENT)
Dept: OPHTHALMOLOGY | Facility: CLINIC | Age: 67
End: 2017-07-17

## 2017-07-17 DIAGNOSIS — H26.9 CATARACT: ICD-10-CM

## 2017-07-17 DIAGNOSIS — H40.10X0 OPEN-ANGLE GLAUCOMA OF BOTH EYES, UNSPECIFIED GLAUCOMA STAGE, UNSPECIFIED OPEN-ANGLE GLAUCOMA TYPE: Primary | ICD-10-CM

## 2017-07-17 DIAGNOSIS — H40.003 BORDERLINE GLAUCOMA (GLAUCOMA SUSPECT), BILATERAL: Primary | ICD-10-CM

## 2017-07-17 PROCEDURE — 99212 OFFICE O/P EST SF 10 MIN: CPT | Performed by: OPHTHALMOLOGY

## 2017-07-17 PROCEDURE — 92083 EXTENDED VISUAL FIELD XM: CPT | Performed by: OPHTHALMOLOGY

## 2017-07-17 NOTE — PROGRESS NOTES
Subjective   Kamlesh Moser is a 66 y.o. male.   Chief Complaint   Patient presents with   • Glaucoma     Uses Azopt  TID OU  States he misses about 1 time per day (almost every day)  Here today for IOP check as well as HVF       HPI     Glaucoma   In both eyes. Additional comments: Uses Azopt  TID OU  States he misses about 1 time per day (almost every day)  Here today for IOP check as well as HVF             Comments   Last OCT 4/18/2016   OD: inferior and superior wedge thinning  OS: full        Last edited by Elaine Lopez MD on 7/17/2017  9:38 AM. (History)          Review of Systems   Constitutional: Negative for chills and fever.   Respiratory: Positive for shortness of breath.    Cardiovascular: Negative for chest pain.   Gastrointestinal: Negative for abdominal pain.   Genitourinary: Negative for dysuria.   Musculoskeletal: Negative for myalgias.   Psychiatric/Behavioral: Negative for confusion.       The following portions of the patient’s history were reviewed and updated as appropriate: current medications, allergies, past medical and surgical history and social history.       Objective   Base Eye Exam     Visual Acuity (Snellen - Linear)      Right Left   Dist cc 20/25 -1 20/25 -1       Correction:  Glasses      Tonometry (Applanation, 9:42 AM)      Right Left   Pressure 12 12         Pupils      Pupils   Right PERRL   Left PERRL         Visual Fields     See HVF      Extraocular Movement      Right Left   Result Full, Ortho Full, Ortho         Neuro/Psych     Oriented x3:  Yes    Mood/Affect:  Normal            Slit Lamp and Fundus Exam     External Exam      Right Left    External Normal Normal      Slit Lamp Exam      Right Left    Lids/Lashes Dermatochalasis - upper lid Dermatochalasis - upper lid    Conjunctiva/Sclera White and quiet White and quiet    Cornea arcus arcus    Anterior Chamber Deep and quiet Deep and quiet    Iris Round and reactive Round and reactive    Lens Nuclear  sclerosis Nuclear sclerosis    Vitreous Normal Normal            Refraction     Wearing Rx      Sphere Cylinder Axis Add   Right -1.25 +0.75 169 +2.75   Left +0.00 +0.50 011 +2.75                   Dior Visual Field :   OD- unreliable but full  OS- unreliable but kezia inferior spots       Assessment/Plan   Diagnoses and all orders for this visit:    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 Patient is on Azopt TID OU (uncertain of Tmax). OCT mild changes and HVF unreliable (risks +FH in glaucoma in father & thin corneas). Continue current regimen of drops (no refills needed). Can consider stopping drops and following closely.      Cataract  Comments:  Not visually significant. Observe.   Plan:       Return in about 4 months (around 11/17/2017).                            This document has been signed by Elaine Lopez MD on July 17, 2017 9:46 AM

## 2017-07-17 NOTE — PROGRESS NOTES
Subjective   Kamlesh Moser is a 66 y.o. male.   Chief Complaint   Patient presents with   • Cataract   • Glaucoma     HPI     Glaucoma   In both eyes.           Comments   3 Month vf 24-2 glaucoma       Last edited by Claudia Colbert on 7/17/2017  9:00 AM. (History)          Review of Systems   Constitutional: Negative for chills and fever.   Respiratory: Negative for shortness of breath.    Cardiovascular: Negative for chest pain.   Gastrointestinal: Negative for abdominal pain.   Genitourinary: Negative for dysuria.   Musculoskeletal: Negative for myalgias.   Psychiatric/Behavioral: Negative for confusion.       Objective   Base Eye Exam     Visual Acuity     Snellen - Linear            Refraction     Wearing Rx      Sphere Cylinder Axis Add   Right -1.25 +0.75 169 +2.75   Left +0.00 +0.50 011 +2.75                   Assessment/Plan   There are no diagnoses linked to this encounter.Plan:       No Follow-up on file.                            This document has been signed by Claudia Colbert on July 17, 2017 9:02 AM

## 2017-07-20 ENCOUNTER — OFFICE VISIT (OUTPATIENT)
Dept: FAMILY MEDICINE CLINIC | Facility: CLINIC | Age: 67
End: 2017-07-20

## 2017-07-20 VITALS
HEART RATE: 87 BPM | DIASTOLIC BLOOD PRESSURE: 82 MMHG | WEIGHT: 221.7 LBS | OXYGEN SATURATION: 99 % | HEIGHT: 69 IN | SYSTOLIC BLOOD PRESSURE: 140 MMHG | BODY MASS INDEX: 32.84 KG/M2

## 2017-07-20 DIAGNOSIS — Z00.00 INITIAL MEDICARE ANNUAL WELLNESS VISIT: ICD-10-CM

## 2017-07-20 DIAGNOSIS — H40.1121 PRIMARY OPEN ANGLE GLAUCOMA OF LEFT EYE, MILD STAGE: Primary | ICD-10-CM

## 2017-07-20 PROCEDURE — G0438 PPPS, INITIAL VISIT: HCPCS | Performed by: FAMILY MEDICINE

## 2017-07-20 NOTE — PROGRESS NOTES
QUICK REFERENCE INFORMATION:  The ABCs of the Annual Wellness Visit    Initial Medicare Wellness Visit    HEALTH RISK ASSESSMENT    1950    Recent Hospitalizations:  No hospitalization(s) within the last year..        Current Medical Providers:  Patient Care Team:  Rafa Vargas MD as PCP - General  Alan Deluca MD as PCP - Claims Attributed  Janes Burrows MD as Surgeon (Orthopedic Surgery)  Mack Yeager MD as Surgeon (Otolaryngology)        Smoking Status:  History   Smoking Status   • Former Smoker   Smokeless Tobacco   • Never Used       Alcohol Consumption:  History   Alcohol Use   • Yes     Comment: social       Depression Screen:   PHQ-9 Depression Screening 7/20/2017   Little interest or pleasure in doing things 0   Feeling down, depressed, or hopeless 0   Trouble falling or staying asleep, or sleeping too much 2   Feeling tired or having little energy 2   Poor appetite or overeating 0   Feeling bad about yourself - or that you are a failure or have let yourself or your family down 0   Trouble concentrating on things, such as reading the newspaper or watching television 0   Moving or speaking so slowly that other people could have noticed. Or the opposite - being so fidgety or restless that you have been moving around a lot more than usual 0   Thoughts that you would be better off dead, or of hurting yourself in some way 0   PHQ-9 Total Score 4   If you checked off any problems, how difficult have these problems made it for you to do your work, take care of things at home, or get along with other people? Somewhat difficult       Health Habits and Functional and Cognitive Screening:  Functional & Cognitive Status 7/20/2017   Do you have difficulty preparing food and eating? No   Do you have difficulty bathing yourself? No   Do you have difficulty getting dressed? No   Do you have difficulty using the toilet? No   Do you have difficulty moving around from place to place? No   In the  past year have you fallen or experienced a near fall? Yes   Do you need help using the phone?  No   Are you deaf or do you have serious difficulty hearing?  Yes   Do you need help with transportation? No   Do you need help shopping? No   Do you need help preparing meals?  No   Do you need help with housework?  Yes   Do you need help with laundry? No   Do you need help taking your medications? No   Do you need help managing money? No   Do you have difficulty concentrating, remembering or making decisions? No       Health Habits  Current Diet: Well Balanced Diet  Dental Exam: Not up to date  Eye Exam: Up to date  Exercise (times per week): 5 times per week  Current Exercise Activities Include: Walking          Does the patient have evidence of cognitive impairment? No    Asiprin use counseling: Taking ASA appropriately as indicated      Recent Lab Results:    Visual Acuity:  No exam data present    Age-appropriate Screening Schedule:  Refer to the list below for future screening recommendations based on patient's age, sex and/or medical conditions. Orders for these recommended tests are listed in the plan section. The patient has been provided with a written plan.    Health Maintenance   Topic Date Due   • INFLUENZA VACCINE  08/01/2017   • LIPID PANEL  06/16/2018   • COLONOSCOPY  01/16/2022   • TDAP/TD VACCINES (2 - Td) 06/19/2024   • PNEUMOCOCCAL VACCINES (65+ LOW/MEDIUM RISK)  Completed   • ZOSTER VACCINE  Completed   • DIABETIC FOOT EXAM  Excluded   • DIABETIC EYE EXAM  Excluded   • URINE MICROALBUMIN  Excluded        Subjective   History of Present Illness    Kamlesh Moser is a 66 y.o. male who presents for an Annual Wellness Visit.    The following portions of the patient's history were reviewed and updated as appropriate: allergies, current medications, past family history, past medical history, past social history, past surgical history and problem list.    Outpatient Medications Prior to Visit    Medication Sig Dispense Refill   • acetic acid-hydrocortisone (VOSOL-HC) 1-2 % otic solution 2 (two) times a day.     • aspirin 325 MG tablet Take 325 mg by mouth daily.     • AZOPT 1 % ophthalmic suspension Administer 1 drop to both eyes 3 (Three) Times a Day. 10 mL 5   • finasteride (PROSCAR) 5 MG tablet Take 1 tablet by mouth Daily. 30 tablet 3   • fluocinolone (SYNALAR) 0.01 % external solution Apply  topically 2 (Two) Times a Day. 3 drops each ear BID x7 Days then PRN 90 mL 0   • fluticasone (FLONASE) 50 MCG/ACT nasal spray 2 sprays into each nostril Daily.     • meloxicam (MOBIC) 15 MG tablet Take 1 tablet by mouth Daily. 30 tablet 3   • montelukast (SINGULAIR) 10 MG tablet Take 1 tablet by mouth Every Night. 30 tablet 5   • omeprazole (priLOSEC) 20 MG capsule TAKE 1 CAP DAILY FOR STOMACH. 30 capsule 10   • pravastatin (PRAVACHOL) 40 MG tablet      • tamsulosin (FLOMAX) 0.4 MG capsule 24 hr capsule TAKE 1 CAPSULE DAILY 30 capsule 7   • triamterene-hydrochlorothiazide (MAXZIDE-25) 37.5-25 MG per tablet        No facility-administered medications prior to visit.        Patient Active Problem List   Diagnosis   • Hiatal hernia   • Arthritis   • GERD (gastroesophageal reflux disease)   • Hyperlipidemia   • Hypertension   • Fracture of pelvis   • Primary osteoarthritis of both knees   • Primary open angle glaucoma of left eye, mild stage   • Cataract   • Vitamin D deficiency   • Temporomandibular joint disorder   • Osteoarthritis of multiple joints   • Fatty liver   • Gallstones       Advance Care Planning:  has NO advance directive - information provided to the patient today    Identification of Risk Factors:  Risk factors include: weight .    Review of Systems    Compared to one year ago, the patient feels his physical health is the same.  Compared to one year ago, the patient feels his mental health is the same.    Objective     Physical Exam    Vitals:    07/20/17 0802   BP: 140/82   Pulse: 87   SpO2: 99%  "  Weight: 221 lb 11.2 oz (101 kg)   Height: 69\" (175.3 cm)   PainSc: 0-No pain       Body mass index is 32.74 kg/(m^2).  Discussed the patient's BMI with him. The BMI is above average; BMI management plan is completed.    Assessment/Plan   Patient Self-Management and Personalized Health Advice  The patient has been provided with information about: diet and exercise and preventive services including:   · Advance directive.    Visit Diagnoses:  No diagnosis found.    No orders of the defined types were placed in this encounter.      Outpatient Encounter Prescriptions as of 7/20/2017   Medication Sig Dispense Refill   • acetic acid-hydrocortisone (VOSOL-HC) 1-2 % otic solution 2 (two) times a day.     • aspirin 325 MG tablet Take 325 mg by mouth daily.     • AZOPT 1 % ophthalmic suspension Administer 1 drop to both eyes 3 (Three) Times a Day. 10 mL 5   • finasteride (PROSCAR) 5 MG tablet Take 1 tablet by mouth Daily. 30 tablet 3   • fluocinolone (SYNALAR) 0.01 % external solution Apply  topically 2 (Two) Times a Day. 3 drops each ear BID x7 Days then PRN 90 mL 0   • fluticasone (FLONASE) 50 MCG/ACT nasal spray 2 sprays into each nostril Daily.     • meloxicam (MOBIC) 15 MG tablet Take 1 tablet by mouth Daily. 30 tablet 3   • montelukast (SINGULAIR) 10 MG tablet Take 1 tablet by mouth Every Night. 30 tablet 5   • omeprazole (priLOSEC) 20 MG capsule TAKE 1 CAP DAILY FOR STOMACH. 30 capsule 10   • pravastatin (PRAVACHOL) 40 MG tablet      • tamsulosin (FLOMAX) 0.4 MG capsule 24 hr capsule TAKE 1 CAPSULE DAILY 30 capsule 7   • triamterene-hydrochlorothiazide (MAXZIDE-25) 37.5-25 MG per tablet        No facility-administered encounter medications on file as of 7/20/2017.        Reviewed use of high risk medication in the elderly: yes  Reviewed for potential of harmful drug interactions in the elderly: yes    Follow Up:  No Follow-up on file.     An After Visit Summary and PPPS with all of these plans were given to the " patient.

## 2017-08-18 ENCOUNTER — TELEPHONE (OUTPATIENT)
Dept: FAMILY MEDICINE CLINIC | Facility: CLINIC | Age: 67
End: 2017-08-18

## 2017-08-18 RX ORDER — PRAVASTATIN SODIUM 40 MG
TABLET ORAL
Qty: 30 TABLET | Refills: 10 | Status: SHIPPED | OUTPATIENT
Start: 2017-08-18 | End: 2018-06-20 | Stop reason: SDUPTHER

## 2017-08-18 RX ORDER — MELOXICAM 15 MG/1
TABLET ORAL
Qty: 30 TABLET | Refills: 2 | Status: SHIPPED | OUTPATIENT
Start: 2017-08-18 | End: 2017-10-26 | Stop reason: SDUPTHER

## 2017-10-23 ENCOUNTER — TELEPHONE (OUTPATIENT)
Dept: FAMILY MEDICINE CLINIC | Facility: CLINIC | Age: 67
End: 2017-10-23

## 2017-10-23 RX ORDER — MONTELUKAST SODIUM 10 MG/1
10 TABLET ORAL NIGHTLY
Qty: 30 TABLET | Refills: 5 | Status: SHIPPED | OUTPATIENT
Start: 2017-10-23 | End: 2017-12-20 | Stop reason: SDUPTHER

## 2017-10-23 NOTE — TELEPHONE ENCOUNTER
Refill sent earlier in the day      ---- Message from Petra Bowling sent at 10/23/2017 11:47 AM CDT -----  Contact: [facility]  Alicia jimenez./   Needs a refill on his generic Singular 10 mg.

## 2017-10-26 RX ORDER — MELOXICAM 15 MG/1
TABLET ORAL
Qty: 30 TABLET | Refills: 1 | Status: SHIPPED | OUTPATIENT
Start: 2017-10-26 | End: 2017-12-20 | Stop reason: SDUPTHER

## 2017-10-26 NOTE — TELEPHONE ENCOUNTER
-Requested Refills Sent    ---- Message from Petra Bowling sent at 10/26/2017 11:05 AM CDT -----  NICOLE NUNEZ/ Message saying he needs his Mobic sent to [facility], not [facility].

## 2017-12-18 RX ORDER — MULTIVIT34/FOLIC AC/NADH/COQ10 1-5-50 MG
TABLET ORAL
Qty: 180 TABLET | OUTPATIENT
Start: 2017-12-18

## 2017-12-19 ENCOUNTER — TELEPHONE (OUTPATIENT)
Dept: FAMILY MEDICINE CLINIC | Facility: CLINIC | Age: 67
End: 2017-12-19

## 2017-12-20 ENCOUNTER — LAB (OUTPATIENT)
Dept: LAB | Facility: HOSPITAL | Age: 67
End: 2017-12-20

## 2017-12-20 ENCOUNTER — TELEPHONE (OUTPATIENT)
Dept: FAMILY MEDICINE CLINIC | Facility: CLINIC | Age: 67
End: 2017-12-20

## 2017-12-20 ENCOUNTER — OFFICE VISIT (OUTPATIENT)
Dept: FAMILY MEDICINE CLINIC | Facility: CLINIC | Age: 67
End: 2017-12-20

## 2017-12-20 VITALS
WEIGHT: 222.8 LBS | DIASTOLIC BLOOD PRESSURE: 82 MMHG | HEART RATE: 81 BPM | OXYGEN SATURATION: 99 % | BODY MASS INDEX: 33 KG/M2 | HEIGHT: 69 IN | SYSTOLIC BLOOD PRESSURE: 138 MMHG

## 2017-12-20 DIAGNOSIS — H91.8X3 OTHER SPECIFIED HEARING LOSS OF BOTH EARS: ICD-10-CM

## 2017-12-20 DIAGNOSIS — I10 ESSENTIAL HYPERTENSION: ICD-10-CM

## 2017-12-20 DIAGNOSIS — Z12.5 SCREENING FOR PROSTATE CANCER: ICD-10-CM

## 2017-12-20 DIAGNOSIS — K76.0 FATTY LIVER: ICD-10-CM

## 2017-12-20 DIAGNOSIS — E78.00 PURE HYPERCHOLESTEROLEMIA: Primary | ICD-10-CM

## 2017-12-20 DIAGNOSIS — K21.9 GASTROESOPHAGEAL REFLUX DISEASE WITHOUT ESOPHAGITIS: ICD-10-CM

## 2017-12-20 LAB
ALBUMIN SERPL-MCNC: 4.5 G/DL (ref 3.4–4.8)
ALBUMIN/GLOB SERPL: 1.6 G/DL (ref 1.1–1.8)
ALP SERPL-CCNC: 49 U/L (ref 38–126)
ALT SERPL W P-5'-P-CCNC: 67 U/L (ref 21–72)
ANION GAP SERPL CALCULATED.3IONS-SCNC: 11 MMOL/L (ref 5–15)
ARTICHOKE IGE QN: 86 MG/DL (ref 1–129)
AST SERPL-CCNC: 61 U/L (ref 17–59)
BILIRUB SERPL-MCNC: 0.6 MG/DL (ref 0.2–1.3)
BUN BLD-MCNC: 16 MG/DL (ref 7–21)
BUN/CREAT SERPL: 14.7 (ref 7–25)
CALCIUM SPEC-SCNC: 9.4 MG/DL (ref 8.4–10.2)
CHLORIDE SERPL-SCNC: 99 MMOL/L (ref 95–110)
CHOLEST SERPL-MCNC: 158 MG/DL (ref 0–199)
CO2 SERPL-SCNC: 26 MMOL/L (ref 22–31)
CREAT BLD-MCNC: 1.09 MG/DL (ref 0.7–1.3)
GFR SERPL CREATININE-BSD FRML MDRD: 67 ML/MIN/1.73 (ref 49–113)
GLOBULIN UR ELPH-MCNC: 2.8 GM/DL (ref 2.3–3.5)
GLUCOSE BLD-MCNC: 112 MG/DL (ref 60–100)
HDLC SERPL-MCNC: 49 MG/DL (ref 60–200)
LDLC/HDLC SERPL: 1.44 {RATIO} (ref 0–3.55)
MAGNESIUM SERPL-MCNC: 2 MG/DL (ref 1.6–2.3)
POTASSIUM BLD-SCNC: 4.1 MMOL/L (ref 3.5–5.1)
PROT SERPL-MCNC: 7.3 G/DL (ref 6.3–8.6)
PSA SERPL-MCNC: 0.31 NG/ML (ref 0–4)
SODIUM BLD-SCNC: 136 MMOL/L (ref 137–145)
TRIGL SERPL-MCNC: 191 MG/DL (ref 20–199)
VIT B12 BLD-MCNC: 762 PG/ML (ref 239–931)

## 2017-12-20 PROCEDURE — 82607 VITAMIN B-12: CPT | Performed by: FAMILY MEDICINE

## 2017-12-20 PROCEDURE — 36415 COLL VENOUS BLD VENIPUNCTURE: CPT | Performed by: FAMILY MEDICINE

## 2017-12-20 PROCEDURE — 83735 ASSAY OF MAGNESIUM: CPT | Performed by: FAMILY MEDICINE

## 2017-12-20 PROCEDURE — 99214 OFFICE O/P EST MOD 30 MIN: CPT | Performed by: FAMILY MEDICINE

## 2017-12-20 PROCEDURE — G0103 PSA SCREENING: HCPCS

## 2017-12-20 PROCEDURE — 80061 LIPID PANEL: CPT | Performed by: FAMILY MEDICINE

## 2017-12-20 PROCEDURE — 80053 COMPREHEN METABOLIC PANEL: CPT | Performed by: FAMILY MEDICINE

## 2017-12-20 RX ORDER — MELOXICAM 15 MG/1
15 TABLET ORAL DAILY
Qty: 30 TABLET | Refills: 1 | Status: SHIPPED | OUTPATIENT
Start: 2017-12-20 | End: 2018-03-09 | Stop reason: SDUPTHER

## 2017-12-20 RX ORDER — MONTELUKAST SODIUM 10 MG/1
10 TABLET ORAL NIGHTLY
Qty: 30 TABLET | Refills: 5 | Status: SHIPPED | OUTPATIENT
Start: 2017-12-20 | End: 2018-09-25 | Stop reason: SDUPTHER

## 2017-12-20 RX ORDER — FINASTERIDE 5 MG/1
5 TABLET, FILM COATED ORAL DAILY
Qty: 30 TABLET | Refills: 3 | Status: SHIPPED | OUTPATIENT
Start: 2017-12-20 | End: 2018-05-03 | Stop reason: SDUPTHER

## 2017-12-20 NOTE — PROGRESS NOTES
Subjective   Kamlesh Moser is a 67 y.o. male.     Hypertension   This is a chronic problem. The current episode started more than 1 year ago. The problem has been waxing and waning since onset. The problem is controlled (fatigue if BP lower). Associated symptoms include shortness of breath (ELIZABETH, black lung). Pertinent negatives include no chest pain, orthopnea, palpitations, peripheral edema or PND.   Hyperlipidemia   This is a chronic problem. The current episode started more than 1 year ago. The problem is controlled. Recent lipid tests were reviewed and are normal. Associated symptoms include shortness of breath (ELIZABETH, black lung). Pertinent negatives include no chest pain or myalgias.   Heartburn   He reports no chest pain or no heartburn.        The following portions of the patient's history were reviewed and updated as appropriate: allergies, current medications, past family history, past medical history, past social history, past surgical history and problem list.    Review of Systems   HENT: Positive for hearing loss. Negative for ear pain.    Eyes: Negative for discharge and itching.        Tre eye drops   Respiratory: Positive for shortness of breath (ELIZABETH, black lung).    Cardiovascular: Negative for chest pain, palpitations, orthopnea and PND.   Gastrointestinal: Negative for heartburn.   Musculoskeletal: Negative for myalgias.   Skin: Positive for wound. Negative for color change and pallor.       Objective   Physical Exam   Constitutional: He is oriented to person, place, and time. He appears well-developed and well-nourished. No distress.   HENT:   Head: Normocephalic and atraumatic.   Right Ear: Tympanic membrane normal. Decreased hearing is noted.   Left Ear: Tympanic membrane normal. Decreased hearing is noted.   Nose: Mucosal edema and rhinorrhea present.   Mouth/Throat: Uvula is midline, oropharynx is clear and moist and mucous membranes are normal.   Cardiovascular: Normal rate, regular  rhythm, normal heart sounds and intact distal pulses.  Exam reveals no gallop and no friction rub.    No murmur heard.  Pulmonary/Chest: Effort normal and breath sounds normal. No respiratory distress. He has no wheezes. He has no rales. He exhibits no tenderness.   Musculoskeletal: He exhibits edema and tenderness.       Vascular Status -  His exam exhibits right foot vasculature normal.Right foot edema: trace bilateral edema. His exam exhibits left foot vasculature normal and left foot edema.  Lymphadenopathy:     He has no cervical adenopathy.   Neurological: He is alert and oriented to person, place, and time.   Skin: Skin is warm and dry. He is not diaphoretic.   Psychiatric: He has a normal mood and affect. His behavior is normal. Judgment and thought content normal.   Nursing note and vitals reviewed.      Assessment/Plan   Problems Addressed this Visit        Cardiovascular and Mediastinum    Hyperlipidemia - Primary (Chronic)    Relevant Orders    Lipid Panel    Hypertension (Chronic)    Relevant Orders    Comprehensive Metabolic Panel       Digestive    GERD (gastroesophageal reflux disease) (Chronic)    Relevant Orders    Vitamin B12    Magnesium    Fatty liver    Relevant Orders    Comprehensive Metabolic Panel      Other Visit Diagnoses     Other specified hearing loss of both ears        Screening for prostate cancer        Relevant Orders    PSA Screen               continues weight loss for fatty liver recommended

## 2017-12-21 NOTE — TELEPHONE ENCOUNTER
Pr Dr. Vargas, Mr. Moser has been called with his recent lab results & recommendations.  Continue his current medications and follow-up as planned or sooner if any problems.      ----- Message from Rafa Vargas MD sent at 12/20/2017  3:50 PM CST -----  Okay stable.  Continue to recommend weight loss for fatty liver

## 2018-01-02 RX ORDER — TAMSULOSIN HYDROCHLORIDE 0.4 MG/1
CAPSULE ORAL
Qty: 30 CAPSULE | Refills: 6 | Status: SHIPPED | OUTPATIENT
Start: 2018-01-02 | End: 2018-06-20 | Stop reason: SDUPTHER

## 2018-01-22 ENCOUNTER — TELEPHONE (OUTPATIENT)
Dept: FAMILY MEDICINE CLINIC | Facility: CLINIC | Age: 68
End: 2018-01-22

## 2018-01-31 RX ORDER — OMEPRAZOLE 20 MG/1
CAPSULE, DELAYED RELEASE ORAL
Qty: 30 CAPSULE | Refills: 9 | Status: SHIPPED | OUTPATIENT
Start: 2018-01-31 | End: 2018-06-20 | Stop reason: SDUPTHER

## 2018-03-09 RX ORDER — MELOXICAM 15 MG/1
15 TABLET ORAL DAILY
Qty: 30 TABLET | Refills: 2 | Status: SHIPPED | OUTPATIENT
Start: 2018-03-09 | End: 2018-06-20 | Stop reason: SDUPTHER

## 2018-03-20 ENCOUNTER — OFFICE VISIT (OUTPATIENT)
Dept: OTOLARYNGOLOGY | Facility: CLINIC | Age: 68
End: 2018-03-20

## 2018-03-20 VITALS — WEIGHT: 224.6 LBS | HEIGHT: 69 IN | BODY MASS INDEX: 33.27 KG/M2 | OXYGEN SATURATION: 98 %

## 2018-03-20 DIAGNOSIS — H60.63 CHRONIC NON-INFECTIVE OTITIS EXTERNA OF BOTH EARS, UNSPECIFIED TYPE: Primary | ICD-10-CM

## 2018-03-20 DIAGNOSIS — Z71.1 FEARED CONDITION NOT DEMONSTRATED: ICD-10-CM

## 2018-03-20 PROCEDURE — 99213 OFFICE O/P EST LOW 20 MIN: CPT | Performed by: OTOLARYNGOLOGY

## 2018-03-20 NOTE — PROGRESS NOTES
"Subjective   Kamlesh Moser is a 67 y.o. male.       History of Present Illness   Patient wears hearing aids and has trouble with chronic otitis externa.  Uses Synalar as needed for itching.  Says he thinks he may need an antibiotic because he had a swelling behind his left ear in the last couple of days although he states is better today.  Has not had any upper respiratory symptoms but says he just \"hasn't been able to get warm\" for about a week.  Has chronic rhinitis and uses Flonase and Singulair for this but states his nasal symptoms are flaring up.      The following portions of the patient's history were reviewed and updated as appropriate: allergies, current medications, past family history, past medical history, past social history, past surgical history and problem list.     reports that he has quit smoking. He has never used smokeless tobacco. He reports that he drinks alcohol. He reports that he does not use drugs.   Patient is not a tobacco user and has not been counseled for use of tobacco products      Review of Systems   Constitutional: Negative for fever.   HENT: Positive for ear discharge.            Objective   Physical Exam  General: Well-developed well-nourished male in no acute distress.  Alert and oriented ×3. Head: Normocephalic. Face: Symmetrical strength and appearance. PERRL. EOMI. Voice:Strong. Speech:Fluent  Ears: External ears no deformity, both ear canals show noninfected squamous debris much more so on the left than the right.  This is all cleaned under the microscope using instrumentation.  Tympanic membranes are intact clear and mobile bilaterally..  Nose: Nares show no discharge mass polyp or purulence.  Boggy mucosa is present.  No gross external deformity.    Oral cavity: Lips and gums without lesions.  Tongue and floor of mouth without lesions.  Parotid and submandibular ducts unobstructed.  No mucosal lesions on the buccal mucosa or vestibule of the mouth.  Pharynx: No " erythema exudate mass or ulcer  Neck: No lymphadenopathy.  No thyromegaly.  Trachea and larynx midline.  No masses in the parotid or submandibular glands.  Manuela Posada no masses palpable in the left neck or the patient indicates he had swelling in the last couple of days.      Assessment/Plan   Kamlesh was seen today for follow-up.    Diagnoses and all orders for this visit:    Chronic non-infective otitis externa of both ears, unspecified type    Feared condition not demonstrated        Plan: Ears cleaned as described above.  Reassured the patient I did not see any evidence of a mass in the neck, nor did he have an ear infection.  Advised continued use of Synalar as needed and return in 1 year, call sooner for problems.

## 2018-04-10 RX ORDER — TRIAMTERENE AND HYDROCHLOROTHIAZIDE 37.5; 25 MG/1; MG/1
TABLET ORAL
Qty: 30 TABLET | Refills: 5 | Status: SHIPPED | OUTPATIENT
Start: 2018-04-10 | End: 2018-06-20 | Stop reason: SDUPTHER

## 2018-05-03 RX ORDER — FINASTERIDE 5 MG/1
TABLET, FILM COATED ORAL
Qty: 30 TABLET | Refills: 2 | Status: SHIPPED | OUTPATIENT
Start: 2018-05-03 | End: 2018-06-20 | Stop reason: SDUPTHER

## 2018-06-20 ENCOUNTER — TELEPHONE (OUTPATIENT)
Dept: FAMILY MEDICINE CLINIC | Facility: CLINIC | Age: 68
End: 2018-06-20

## 2018-06-20 ENCOUNTER — APPOINTMENT (OUTPATIENT)
Dept: LAB | Facility: HOSPITAL | Age: 68
End: 2018-06-20

## 2018-06-20 ENCOUNTER — OFFICE VISIT (OUTPATIENT)
Dept: FAMILY MEDICINE CLINIC | Facility: CLINIC | Age: 68
End: 2018-06-20

## 2018-06-20 VITALS
SYSTOLIC BLOOD PRESSURE: 120 MMHG | HEIGHT: 69 IN | WEIGHT: 219 LBS | DIASTOLIC BLOOD PRESSURE: 68 MMHG | BODY MASS INDEX: 32.44 KG/M2 | OXYGEN SATURATION: 98 % | HEART RATE: 82 BPM

## 2018-06-20 DIAGNOSIS — K44.9 HIATAL HERNIA: ICD-10-CM

## 2018-06-20 DIAGNOSIS — E78.00 PURE HYPERCHOLESTEROLEMIA: Primary | ICD-10-CM

## 2018-06-20 DIAGNOSIS — I10 ESSENTIAL HYPERTENSION: ICD-10-CM

## 2018-06-20 DIAGNOSIS — M15.9 PRIMARY OSTEOARTHRITIS INVOLVING MULTIPLE JOINTS: ICD-10-CM

## 2018-06-20 DIAGNOSIS — K76.0 FATTY LIVER: ICD-10-CM

## 2018-06-20 LAB
ALBUMIN SERPL-MCNC: 4.3 G/DL (ref 3.4–4.8)
ALBUMIN/GLOB SERPL: 1.7 G/DL (ref 1.1–1.8)
ALP SERPL-CCNC: 50 U/L (ref 38–126)
ALT SERPL W P-5'-P-CCNC: 44 U/L (ref 21–72)
ANION GAP SERPL CALCULATED.3IONS-SCNC: 10 MMOL/L (ref 5–15)
ARTICHOKE IGE QN: 66 MG/DL (ref 1–129)
AST SERPL-CCNC: 45 U/L (ref 17–59)
BACTERIA UR QL AUTO: ABNORMAL /HPF
BILIRUB SERPL-MCNC: 0.5 MG/DL (ref 0.2–1.3)
BILIRUB UR QL STRIP: NEGATIVE
BUN BLD-MCNC: 17 MG/DL (ref 7–21)
BUN/CREAT SERPL: 16.5 (ref 7–25)
CALCIUM SPEC-SCNC: 8.9 MG/DL (ref 8.4–10.2)
CHLORIDE SERPL-SCNC: 100 MMOL/L (ref 95–110)
CHOLEST SERPL-MCNC: 143 MG/DL (ref 0–199)
CLARITY UR: CLEAR
CO2 SERPL-SCNC: 27 MMOL/L (ref 22–31)
COLOR UR: YELLOW
CREAT BLD-MCNC: 1.03 MG/DL (ref 0.7–1.3)
GFR SERPL CREATININE-BSD FRML MDRD: 72 ML/MIN/1.73 (ref 49–113)
GLOBULIN UR ELPH-MCNC: 2.5 GM/DL (ref 2.3–3.5)
GLUCOSE BLD-MCNC: 118 MG/DL (ref 60–100)
GLUCOSE UR STRIP-MCNC: NEGATIVE MG/DL
HDLC SERPL-MCNC: 53 MG/DL (ref 60–200)
HGB UR QL STRIP.AUTO: NEGATIVE
HYALINE CASTS UR QL AUTO: ABNORMAL /LPF
KETONES UR QL STRIP: NEGATIVE
LDLC/HDLC SERPL: 1.05 {RATIO} (ref 0–3.55)
LEUKOCYTE ESTERASE UR QL STRIP.AUTO: NEGATIVE
MAGNESIUM SERPL-MCNC: 2 MG/DL (ref 1.6–2.3)
NITRITE UR QL STRIP: NEGATIVE
PH UR STRIP.AUTO: 6.5 [PH] (ref 5–9)
POTASSIUM BLD-SCNC: 4 MMOL/L (ref 3.5–5.1)
PROT SERPL-MCNC: 6.8 G/DL (ref 6.3–8.6)
PROT UR QL STRIP: NEGATIVE
RBC # UR: ABNORMAL /HPF
REF LAB TEST METHOD: ABNORMAL
SODIUM BLD-SCNC: 137 MMOL/L (ref 137–145)
SP GR UR STRIP: 1.01 (ref 1–1.03)
SQUAMOUS #/AREA URNS HPF: ABNORMAL /HPF
TRIGL SERPL-MCNC: 171 MG/DL (ref 20–199)
UROBILINOGEN UR QL STRIP: NORMAL
VIT B12 BLD-MCNC: 543 PG/ML (ref 239–931)
WBC UR QL AUTO: ABNORMAL /HPF

## 2018-06-20 PROCEDURE — 99214 OFFICE O/P EST MOD 30 MIN: CPT | Performed by: FAMILY MEDICINE

## 2018-06-20 PROCEDURE — 36415 COLL VENOUS BLD VENIPUNCTURE: CPT | Performed by: FAMILY MEDICINE

## 2018-06-20 PROCEDURE — 82607 VITAMIN B-12: CPT | Performed by: FAMILY MEDICINE

## 2018-06-20 PROCEDURE — 81001 URINALYSIS AUTO W/SCOPE: CPT | Performed by: FAMILY MEDICINE

## 2018-06-20 PROCEDURE — 80061 LIPID PANEL: CPT | Performed by: FAMILY MEDICINE

## 2018-06-20 PROCEDURE — 80053 COMPREHEN METABOLIC PANEL: CPT | Performed by: FAMILY MEDICINE

## 2018-06-20 PROCEDURE — 83735 ASSAY OF MAGNESIUM: CPT | Performed by: FAMILY MEDICINE

## 2018-06-20 RX ORDER — PRAVASTATIN SODIUM 40 MG
40 TABLET ORAL NIGHTLY
Qty: 30 TABLET | Refills: 11 | Status: SHIPPED | OUTPATIENT
Start: 2018-06-20 | End: 2019-06-18 | Stop reason: SDUPTHER

## 2018-06-20 RX ORDER — TAMSULOSIN HYDROCHLORIDE 0.4 MG/1
1 CAPSULE ORAL DAILY
Qty: 30 CAPSULE | Refills: 6 | Status: SHIPPED | OUTPATIENT
Start: 2018-06-20 | End: 2018-07-31 | Stop reason: SDUPTHER

## 2018-06-20 RX ORDER — FINASTERIDE 5 MG/1
5 TABLET, FILM COATED ORAL DAILY
Qty: 30 TABLET | Refills: 2 | Status: SHIPPED | OUTPATIENT
Start: 2018-06-20 | End: 2018-07-31 | Stop reason: SDUPTHER

## 2018-06-20 RX ORDER — TRIAMTERENE AND HYDROCHLOROTHIAZIDE 37.5; 25 MG/1; MG/1
1 TABLET ORAL DAILY
Qty: 30 TABLET | Refills: 5 | Status: SHIPPED | OUTPATIENT
Start: 2018-06-20 | End: 2018-09-25 | Stop reason: SDUPTHER

## 2018-06-20 RX ORDER — OMEPRAZOLE 20 MG/1
20 CAPSULE, DELAYED RELEASE ORAL DAILY
Qty: 30 CAPSULE | Refills: 11 | Status: SHIPPED | OUTPATIENT
Start: 2018-06-20 | End: 2019-06-18 | Stop reason: SDUPTHER

## 2018-06-20 RX ORDER — MELOXICAM 15 MG/1
15 TABLET ORAL DAILY
Qty: 30 TABLET | Refills: 2 | Status: SHIPPED | OUTPATIENT
Start: 2018-06-20 | End: 2018-08-08 | Stop reason: SDUPTHER

## 2018-06-20 NOTE — TELEPHONE ENCOUNTER
Pr Dr. Vargas, Mr. Moser has been called with his recent lab results & recommendations.  Continue his current medications and follow-up as planned or sooner if any problems.      ----- Message from Rafa Vargas MD sent at 6/20/2018  2:48 PM CDT -----  Okay, no sign of urinary tract infection.

## 2018-06-20 NOTE — PROGRESS NOTES
Subjective   Kamlesh Moser is a 67 y.o. male.     Hyperlipidemia   This is a chronic problem. The current episode started more than 1 year ago. The problem is controlled. Recent lipid tests were reviewed and are normal. Associated symptoms include shortness of breath (ELIZABETH, black lung). Pertinent negatives include no chest pain or myalgias.   Hypertension   This is a chronic problem. The current episode started more than 1 year ago. The problem has been waxing and waning since onset. The problem is controlled (fatigue if BP lower). Associated symptoms include shortness of breath (ELIZABETH, black lung). Pertinent negatives include no chest pain, orthopnea, palpitations, peripheral edema or PND.   Heartburn   He reports no chest pain or no heartburn.   Arthritis   Presents for follow-up visit. He complains of pain. The symptoms have been stable. Affected locations include the left knee and right knee.        The following portions of the patient's history were reviewed and updated as appropriate: allergies, current medications, past family history, past medical history, past social history, past surgical history and problem list.    Review of Systems   HENT: Positive for hearing loss. Negative for ear pain.    Eyes: Negative for discharge and itching.        Tre eye drops   Respiratory: Positive for shortness of breath (ELIZABETH, black lung).    Cardiovascular: Negative for chest pain, palpitations, orthopnea and PND.   Gastrointestinal: Negative for heartburn.   Musculoskeletal: Positive for arthritis. Negative for myalgias.   Skin: Positive for wound. Negative for color change and pallor.       Objective   Physical Exam   Constitutional: He is oriented to person, place, and time. He appears well-developed and well-nourished. No distress.   HENT:   Head: Normocephalic and atraumatic.   Right Ear: Tympanic membrane normal. Decreased hearing is noted.   Left Ear: Tympanic membrane normal. Decreased hearing is noted.   Nose:  Mucosal edema and rhinorrhea present.   Mouth/Throat: Uvula is midline, oropharynx is clear and moist and mucous membranes are normal.   Cardiovascular: Normal rate, regular rhythm, normal heart sounds and intact distal pulses.  Exam reveals no gallop and no friction rub.    No murmur heard.  Pulmonary/Chest: Effort normal and breath sounds normal. No respiratory distress. He has no wheezes. He has no rales. He exhibits no tenderness.   Abdominal: Soft. Bowel sounds are normal. There is no tenderness.   Musculoskeletal: He exhibits no edema or tenderness.     Vascular Status -  His right foot exhibits abnormal foot vasculature .Right foot edema: trace bilateral edema. His left foot exhibits abnormal foot vasculature  and abnormal foot edema.  Lymphadenopathy:     He has no cervical adenopathy.   Neurological: He is alert and oriented to person, place, and time.   Skin: Skin is warm and dry. He is not diaphoretic.   Psychiatric: He has a normal mood and affect. His behavior is normal. Judgment and thought content normal.   Nursing note and vitals reviewed.      Assessment/Plan   Problems Addressed this Visit        Cardiovascular and Mediastinum    Hyperlipidemia - Primary (Chronic)    Relevant Medications    pravastatin (PRAVACHOL) 40 MG tablet    Other Relevant Orders    Lipid Panel    Hypertension (Chronic)    Relevant Medications    triamterene-hydrochlorothiazide (MAXZIDE-25) 37.5-25 MG per tablet    Other Relevant Orders    Comprehensive Metabolic Panel    Urinalysis With Microscopic - Urine, Clean Catch       Respiratory    Hiatal hernia    Relevant Medications    omeprazole (priLOSEC) 20 MG capsule    Other Relevant Orders    Vitamin B12    Magnesium       Digestive    Fatty liver       Musculoskeletal and Integument    Osteoarthritis of multiple joints               continues weight loss for fatty liver recommended

## 2018-07-31 RX ORDER — FINASTERIDE 5 MG/1
TABLET, FILM COATED ORAL
Qty: 30 TABLET | Refills: 1 | Status: SHIPPED | OUTPATIENT
Start: 2018-07-31 | End: 2018-09-25 | Stop reason: SDUPTHER

## 2018-07-31 RX ORDER — TAMSULOSIN HYDROCHLORIDE 0.4 MG/1
CAPSULE ORAL
Qty: 30 CAPSULE | Refills: 5 | Status: SHIPPED | OUTPATIENT
Start: 2018-07-31 | End: 2019-01-21 | Stop reason: SDUPTHER

## 2018-08-08 RX ORDER — MELOXICAM 15 MG/1
TABLET ORAL
Qty: 30 TABLET | Refills: 0 | Status: SHIPPED | OUTPATIENT
Start: 2018-08-08 | End: 2018-12-18 | Stop reason: SDUPTHER

## 2018-08-29 ENCOUNTER — HOSPITAL ENCOUNTER (OUTPATIENT)
Dept: MRI IMAGING | Facility: HOSPITAL | Age: 68
Discharge: HOME | End: 2018-08-29

## 2018-08-29 DIAGNOSIS — M54.12 CERVICAL RADICULOPATHY: ICD-10-CM

## 2018-09-25 RX ORDER — FINASTERIDE 5 MG/1
TABLET, FILM COATED ORAL
Qty: 30 TABLET | Refills: 0 | Status: SHIPPED | OUTPATIENT
Start: 2018-09-25 | End: 2018-10-16 | Stop reason: SDUPTHER

## 2018-09-25 RX ORDER — TRIAMTERENE AND HYDROCHLOROTHIAZIDE 37.5; 25 MG/1; MG/1
TABLET ORAL
Qty: 30 TABLET | Refills: 4 | Status: SHIPPED | OUTPATIENT
Start: 2018-09-25 | End: 2019-03-12 | Stop reason: SDUPTHER

## 2018-09-25 RX ORDER — MONTELUKAST SODIUM 10 MG/1
TABLET ORAL
Qty: 30 TABLET | Refills: 4 | Status: SHIPPED | OUTPATIENT
Start: 2018-09-25 | End: 2018-10-16 | Stop reason: SDUPTHER

## 2018-10-16 ENCOUNTER — TELEPHONE (OUTPATIENT)
Dept: FAMILY MEDICINE CLINIC | Facility: CLINIC | Age: 68
End: 2018-10-16

## 2018-10-16 RX ORDER — MONTELUKAST SODIUM 10 MG/1
10 TABLET ORAL
Qty: 30 TABLET | Refills: 4 | Status: SHIPPED | OUTPATIENT
Start: 2018-10-16 | End: 2019-03-12 | Stop reason: SDUPTHER

## 2018-10-16 RX ORDER — FINASTERIDE 5 MG/1
TABLET, FILM COATED ORAL
Qty: 30 TABLET | Refills: 5 | Status: SHIPPED | OUTPATIENT
Start: 2018-10-16 | End: 2019-06-18 | Stop reason: SDUPTHER

## 2018-10-16 NOTE — TELEPHONE ENCOUNTER
Patient has called and is asking for refills on his montelukast (SINGULAIR) 10 MG tablet. [facility]

## 2018-12-18 ENCOUNTER — OFFICE VISIT (OUTPATIENT)
Dept: FAMILY MEDICINE CLINIC | Facility: CLINIC | Age: 68
End: 2018-12-18

## 2018-12-18 ENCOUNTER — TELEPHONE (OUTPATIENT)
Dept: FAMILY MEDICINE CLINIC | Facility: CLINIC | Age: 68
End: 2018-12-18

## 2018-12-18 ENCOUNTER — LAB (OUTPATIENT)
Dept: LAB | Facility: HOSPITAL | Age: 68
End: 2018-12-18

## 2018-12-18 VITALS
OXYGEN SATURATION: 99 % | SYSTOLIC BLOOD PRESSURE: 138 MMHG | DIASTOLIC BLOOD PRESSURE: 90 MMHG | HEIGHT: 69 IN | HEART RATE: 84 BPM | WEIGHT: 222.9 LBS | BODY MASS INDEX: 33.02 KG/M2

## 2018-12-18 DIAGNOSIS — M15.9 PRIMARY OSTEOARTHRITIS INVOLVING MULTIPLE JOINTS: ICD-10-CM

## 2018-12-18 DIAGNOSIS — Z12.5 PROSTATE CANCER SCREENING: ICD-10-CM

## 2018-12-18 DIAGNOSIS — E78.00 PURE HYPERCHOLESTEROLEMIA: Primary | ICD-10-CM

## 2018-12-18 DIAGNOSIS — I10 ESSENTIAL HYPERTENSION: ICD-10-CM

## 2018-12-18 DIAGNOSIS — K21.9 GASTROESOPHAGEAL REFLUX DISEASE WITHOUT ESOPHAGITIS: ICD-10-CM

## 2018-12-18 DIAGNOSIS — H65.06 RECURRENT ACUTE SEROUS OTITIS MEDIA OF BOTH EARS: ICD-10-CM

## 2018-12-18 LAB
ALBUMIN SERPL-MCNC: 4.9 G/DL (ref 3.4–4.8)
ALBUMIN/GLOB SERPL: 1.8 G/DL (ref 1.1–1.8)
ALP SERPL-CCNC: 59 U/L (ref 38–126)
ALT SERPL W P-5'-P-CCNC: 46 U/L (ref 21–72)
ANION GAP SERPL CALCULATED.3IONS-SCNC: 12 MMOL/L (ref 5–15)
ARTICHOKE IGE QN: 79 MG/DL (ref 1–129)
AST SERPL-CCNC: 62 U/L (ref 17–59)
BILIRUB SERPL-MCNC: 0.7 MG/DL (ref 0.2–1.3)
BUN BLD-MCNC: 17 MG/DL (ref 7–21)
BUN/CREAT SERPL: 15 (ref 7–25)
CALCIUM SPEC-SCNC: 9.7 MG/DL (ref 8.4–10.2)
CHLORIDE SERPL-SCNC: 97 MMOL/L (ref 95–110)
CHOLEST SERPL-MCNC: 168 MG/DL (ref 0–199)
CO2 SERPL-SCNC: 26 MMOL/L (ref 22–31)
CREAT BLD-MCNC: 1.13 MG/DL (ref 0.7–1.3)
GFR SERPL CREATININE-BSD FRML MDRD: 64 ML/MIN/1.73 (ref 42–98)
GLOBULIN UR ELPH-MCNC: 2.7 GM/DL (ref 2.3–3.5)
GLUCOSE BLD-MCNC: 113 MG/DL (ref 60–100)
HDLC SERPL-MCNC: 59 MG/DL (ref 60–200)
LDLC/HDLC SERPL: 1.22 {RATIO} (ref 0–3.55)
MAGNESIUM SERPL-MCNC: 2.3 MG/DL (ref 1.6–2.3)
POTASSIUM BLD-SCNC: 4.4 MMOL/L (ref 3.5–5.1)
PROT SERPL-MCNC: 7.6 G/DL (ref 6.3–8.6)
PSA SERPL-MCNC: 0.32 NG/ML (ref 0–4)
SODIUM BLD-SCNC: 135 MMOL/L (ref 137–145)
TRIGL SERPL-MCNC: 184 MG/DL (ref 20–199)
VIT B12 BLD-MCNC: 529 PG/ML (ref 239–931)

## 2018-12-18 PROCEDURE — 80053 COMPREHEN METABOLIC PANEL: CPT | Performed by: FAMILY MEDICINE

## 2018-12-18 PROCEDURE — 36415 COLL VENOUS BLD VENIPUNCTURE: CPT | Performed by: FAMILY MEDICINE

## 2018-12-18 PROCEDURE — 82607 VITAMIN B-12: CPT | Performed by: FAMILY MEDICINE

## 2018-12-18 PROCEDURE — 80061 LIPID PANEL: CPT | Performed by: FAMILY MEDICINE

## 2018-12-18 PROCEDURE — 99214 OFFICE O/P EST MOD 30 MIN: CPT | Performed by: FAMILY MEDICINE

## 2018-12-18 PROCEDURE — G0103 PSA SCREENING: HCPCS

## 2018-12-18 PROCEDURE — 83735 ASSAY OF MAGNESIUM: CPT | Performed by: FAMILY MEDICINE

## 2018-12-18 RX ORDER — MELOXICAM 15 MG/1
TABLET ORAL
Qty: 30 TABLET | Refills: 1 | Status: SHIPPED | OUTPATIENT
Start: 2018-12-18 | End: 2019-01-31 | Stop reason: SDUPTHER

## 2018-12-18 NOTE — TELEPHONE ENCOUNTER
Pr Dr. Vargas, Mr. Moser has been called with his recent lab results & recommendations.  Continue his current medications and follow-up as planned or sooner if any problems.      ----- Message from Rafa Vargas MD sent at 12/18/2018  4:15 PM CST -----  Ok, call or send card.

## 2018-12-18 NOTE — PROGRESS NOTES
Subjective   Kamlesh Moser is a 68 y.o. male.     Hyperlipidemia   This is a chronic problem. The current episode started more than 1 year ago. The problem is controlled. Recent lipid tests were reviewed and are normal. Associated symptoms include shortness of breath (ELIZABETH, black lung). Pertinent negatives include no chest pain or myalgias.   Hypertension   This is a chronic problem. The current episode started more than 1 year ago. The problem has been waxing and waning since onset. The problem is controlled (fatigue if BP lower). Associated symptoms include shortness of breath (ELIZABETH, black lung). Pertinent negatives include no chest pain, orthopnea, palpitations, peripheral edema or PND.   Heartburn   He reports no chest pain or no heartburn. This is a chronic problem. The current episode started more than 1 year ago. The problem occurs rarely.   Arthritis   Presents for follow-up ([facility] [place]) visit. He complains of pain. The symptoms have been stable. Affected locations include the left knee and right knee. His pain is at a severity of 0/10.        The following portions of the patient's history were reviewed and updated as appropriate: allergies, current medications, past family history, past medical history, past social history, past surgical history and problem list.    Review of Systems   HENT: Positive for hearing loss. Negative for ear pain.    Eyes: Negative for discharge and itching.        Tre eye drops   Respiratory: Positive for shortness of breath (ELIZABETH, black lung).    Cardiovascular: Negative for chest pain, palpitations, orthopnea and PND.   Gastrointestinal: Negative for heartburn.   Musculoskeletal: Positive for arthritis. Negative for myalgias.   Skin: Positive for wound. Negative for color change and pallor.       Objective   Physical Exam   Constitutional: He is oriented to person, place, and time. He appears well-developed and well-nourished. No distress.   HENT:    Head: Normocephalic and atraumatic.   Right Ear: Tympanic membrane is bulging. Decreased hearing is noted.   Left Ear: Tympanic membrane is injected and bulging. Decreased hearing is noted.   Nose: Mucosal edema and rhinorrhea present.   Mouth/Throat: Uvula is midline, oropharynx is clear and moist and mucous membranes are normal.   Cardiovascular: Normal rate, regular rhythm, normal heart sounds and intact distal pulses. Exam reveals no gallop and no friction rub.   No murmur heard.  Pulmonary/Chest: Effort normal and breath sounds normal. No respiratory distress. He has no wheezes. He has no rales. He exhibits no tenderness.   Abdominal: Soft. Bowel sounds are normal. There is no tenderness.   Musculoskeletal: He exhibits no edema or tenderness.     Vascular Status -  His right foot exhibits abnormal foot vasculature .Right foot edema: trace bilateral edema. His left foot exhibits abnormal foot vasculature  and abnormal foot edema.  Lymphadenopathy:        Head (right side): No submental and no submandibular adenopathy present.        Head (left side): No submental and no submandibular adenopathy present.     He has no cervical adenopathy.   Neurological: He is alert and oriented to person, place, and time.   Skin: Skin is warm and dry. He is not diaphoretic.   Psychiatric: He has a normal mood and affect. His behavior is normal. Judgment and thought content normal.   Nursing note and vitals reviewed.      Assessment/Plan   Problems Addressed this Visit        Cardiovascular and Mediastinum    Hyperlipidemia - Primary (Chronic)    Relevant Orders    Lipid Panel    Hypertension (Chronic)    Relevant Orders    Comprehensive Metabolic Panel       Digestive    GERD (gastroesophageal reflux disease) (Chronic)    Relevant Orders    Vitamin B12    Magnesium       Musculoskeletal and Integument    Osteoarthritis of multiple joints      Other Visit Diagnoses     Recurrent acute serous otitis media of both ears         Prostate cancer screening        Relevant Orders    PSA Screen        Patient has been erroneously marked as diabetic. Based on the available clinical information, he does not have diabetes and should therefore be excluded from diabetic health maintenance and quality measures for the remainder of the reporting period.       continues weight loss for fatty liver recommended

## 2018-12-18 NOTE — TELEPHONE ENCOUNTER
Pr Dr. Vargas, Mr. Moser has been called with his recent lab results & recommendations.  Continue his current medications and follow-up as planned or sooner if any problems.      ----- Message from Rafa Vargas MD sent at 12/18/2018  4:20 PM CST -----  Ok, call or send card.  res

## 2019-01-21 RX ORDER — TAMSULOSIN HYDROCHLORIDE 0.4 MG/1
CAPSULE ORAL
Qty: 30 CAPSULE | Refills: 4 | Status: SHIPPED | OUTPATIENT
Start: 2019-01-21 | End: 2019-06-18 | Stop reason: SDUPTHER

## 2019-01-31 RX ORDER — MELOXICAM 15 MG/1
TABLET ORAL
Qty: 30 TABLET | Refills: 0 | Status: SHIPPED | OUTPATIENT
Start: 2019-01-31 | End: 2019-02-04 | Stop reason: SDUPTHER

## 2019-02-03 ENCOUNTER — HOSPITAL ENCOUNTER (EMERGENCY)
Facility: HOSPITAL | Age: 69
Discharge: HOME | End: 2019-02-03
Attending: EMERGENCY MEDICINE | Admitting: EMERGENCY MEDICINE

## 2019-02-03 VITALS
HEIGHT: 69 IN | OXYGEN SATURATION: 96 % | TEMPERATURE: 98.2 F | HEART RATE: 87 BPM | SYSTOLIC BLOOD PRESSURE: 150 MMHG | BODY MASS INDEX: 31.4 KG/M2 | DIASTOLIC BLOOD PRESSURE: 78 MMHG | RESPIRATION RATE: 20 BRPM | WEIGHT: 212 LBS

## 2019-02-03 DIAGNOSIS — R04.0 ACUTE ANTERIOR EPISTAXIS: Primary | ICD-10-CM

## 2019-02-03 PROCEDURE — 99283 EMERGENCY DEPT VISIT LOW MDM: CPT

## 2019-02-03 RX ORDER — GINSENG 100 MG
CAPSULE ORAL 2 TIMES DAILY
Qty: 14 G | Refills: 0 | Status: SHIPPED | OUTPATIENT
Start: 2019-02-03 | End: 2020-06-17 | Stop reason: SDUPTHER

## 2019-02-03 RX ORDER — OXYMETAZOLINE HYDROCHLORIDE 0.05 G/100ML
SPRAY NASAL
Status: COMPLETED
Start: 2019-02-03 | End: 2019-02-03

## 2019-02-03 RX ADMIN — SILVER NITRATE APPLICATORS: 25; 75 STICK TOPICAL at 12:02

## 2019-02-03 RX ADMIN — OXYMETAZOLINE HYDROCHLORIDE: 5 SPRAY NASAL at 10:12

## 2019-02-03 NOTE — ED PROVIDER NOTES
Subjective   68 years old presented in the ER with chief complaint of left nasal bleed off and on for the last 2 days and is gradually worsening.  Patient report having nasal bleed off and on for a long time.  He has not been taking intranasal steroids.  He has dryness of the nose.  Denies being on any blood thinners.        History provided by:  Patient  Nose Bleed   Location:  L nare  Severity:  Mild  Duration:  2 days  Timing:  Intermittent  Progression:  Worsening  Chronicity:  New  Relieved by:  Nothing  Worsened by:  Sneezing  Ineffective treatments:  Applying pressure  Associated symptoms: congestion    Associated symptoms: no blood in oropharynx, no cough, no dizziness, no facial pain, no fever and no sinus pain    Risk factors: frequent nosebleeds        Review of Systems   Constitutional: Negative for chills and fever.   HENT: Positive for congestion and nosebleeds. Negative for sinus pain.    Eyes: Negative for pain.   Respiratory: Negative for cough and chest tightness.    Cardiovascular: Negative for chest pain and palpitations.   Gastrointestinal: Negative for abdominal pain.   Genitourinary: Negative for flank pain.   Skin: Negative for color change.   Neurological: Negative for dizziness.   Psychiatric/Behavioral: Negative for agitation.       Past Medical History:   Diagnosis Date   • Acute maxillary sinusitis    • Allergic rhinitis      Moderately severe      • Arthritis    • Arthritis    • Atopic conjunctivitis    • Benign prostatic hyperplasia    • Bilateral hearing loss    • Cellulitis     CHESTWAL   • Chronic otitis externa    • Common cold    • Diabetes (CMS/HCC)    • Diverticular disease of colon    • Dyspnea    • Essential hypertension    • Fracture of pelvis (CMS/HCC)    • Fracture of wrist    • Fracture, radius    • Fracture, ulna    • GERD (gastroesophageal reflux disease)    • GERD (gastroesophageal reflux disease)    • Hiatal hernia    • Hiatal hernia    • Hyperlipidemia    •  Hyperlipidemia    • Hypertension    • Hypertension    • Hypertensive disorder    • Immunization due    • Inflamed seborrheic keratosis    • Influenza vaccination given     Influenza vaccination   12/10/2013   • Low back pain    • Need for immunization against influenza    • Need for prophylactic vaccination against Streptococcus pneumoniae (pneumococcus)    • Need for prophylactic vaccination with combined diphtheria-tetanus-pertussis (DTP) vaccine     Need for prophylactic vaccination and inoculation against Diptheria-tetanus-pertussis, combined [DTP] [DTaP]     • Nuclear senile cataract     moderate   • Onychomycosis    • Osteoarthritis of multiple joints    • Primary open angle glaucoma     Right eye IOP too high s/p LT      • Primary osteoarthritis of both knees    • Rhinitis    • Screening for malignant neoplasm of colon    • Screening for malignant neoplasm of prostate    • Shoulder pain    • Temporomandibular joint disorder    • Vitamin D deficiency        Allergies   Allergen Reactions   • Penicillins        Past Surgical History:   Procedure Laterality Date   • BACK SURGERY     • BACK SURGERY  1982    Low back disk surgery    • CARDIAC CATHETERIZATION  02/08/2011    Symptoms of angina that have resolved with normalization of blood pressure. Very mild nonobstructive coronary artery disease. Normal systolic function.   • CRYOTHERAPY  06/07/2013    CRYOTHERAPY OF ACNE   • ENDOSCOPY AND COLONOSCOPY  01/16/2012    x2, Diverticulosis in sigmoid colon. Hemorrhoids in anus.   • ENDOSCOPY W/ PEG TUBE PLACEMENT  04/12/2002    EGD w/ tube: Dysphagia, Esophagitis, Hiatal hernia, Duodenitis.   • INCISION AND DRAINAGE ABSCESS  04/10/2013    I&D, Simple    • INJECTION OF MEDICATION  05/25/2016    Kenalog x3   • KNEE SURGERY     • KNEE SURGERY  05/12/2003    x3, Arthroscopic medial meniscectomy, right knee.   • OTHER SURGICAL HISTORY  09/11/1996    Forearm or wrist surgery: x2, Removal of K-wires, left radius    • OTHER  SURGICAL HISTORY  04/25/1986    Hand/finger surgery: Repair radial collateral ligament, I&D, right fifth finger. Repair laceration.   • PROCTOSIGMOIDOSCOPY  04/23/1982    Proctosigmoidoscopy, Rigid: Normal   • REFRACTIVE SURGERY  12/29/2014    LASER SURGERY OF EYE: x2   • WRIST SURGERY         Family History   Problem Relation Age of Onset   • Diabetes Mother    • Heart disease Mother 60        MI   • Arthritis Mother    • Hypertension Mother    • Hyperlipidemia Mother    • Kidney disease Mother    • Thyroid disease Mother    • Diabetes Father    • Heart disease Father    • Arthritis Father    • Hypertension Father    • Hyperlipidemia Father    • Stroke Father 80   • Diabetes Other    • Heart disease Other    • Hypertension Other    • Heart disease Brother 40        cabg       Social History     Socioeconomic History   • Marital status:      Spouse name: Not on file   • Number of children: Not on file   • Years of education: Not on file   • Highest education level: Not on file   Tobacco Use   • Smoking status: Former Smoker   • Smokeless tobacco: Never Used   Substance and Sexual Activity   • Alcohol use: Yes     Comment: social   • Drug use: No   • Sexual activity: Defer           Objective   Physical Exam   Constitutional: He is oriented to person, place, and time. He appears well-developed and well-nourished.   HENT:   Head: Normocephalic and atraumatic.   Nose: Mucosal edema present. Epistaxis is observed.   Eyes: Conjunctivae are normal.   Neck: Normal range of motion. Neck supple.   Cardiovascular: Normal rate, regular rhythm and normal heart sounds.   Pulmonary/Chest: Effort normal and breath sounds normal.   Abdominal: Soft.   Musculoskeletal: Normal range of motion.   Neurological: He is alert and oriented to person, place, and time.   Skin: Skin is warm. Capillary refill takes less than 2 seconds.   Psychiatric: He has a normal mood and affect.   Nursing note and vitals reviewed.      Procedures            ED Course                  MDM  Number of Diagnoses or Management Options  Acute anterior epistaxis:   Diagnosis management comments: Patient has a bleeding from left anterior septum.  Afrin spray is applied.  Lesion is cauterized with silver nitrate.  Patient is observed and no recurrence of bleed.  Being discharged with outpatient follow-up.        Final diagnoses:   Acute anterior epistaxis            Fernandez Vergara MD  02/03/19 1113

## 2019-02-04 ENCOUNTER — OFFICE VISIT (OUTPATIENT)
Dept: FAMILY MEDICINE CLINIC | Facility: CLINIC | Age: 69
End: 2019-02-04

## 2019-02-04 VITALS
HEIGHT: 69 IN | WEIGHT: 222 LBS | SYSTOLIC BLOOD PRESSURE: 136 MMHG | HEART RATE: 105 BPM | OXYGEN SATURATION: 98 % | BODY MASS INDEX: 32.88 KG/M2 | DIASTOLIC BLOOD PRESSURE: 78 MMHG

## 2019-02-04 DIAGNOSIS — R04.0 EPISTAXIS: ICD-10-CM
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
## 2019-02-04 PROCEDURE — 99213 OFFICE O/P EST LOW 20 MIN: CPT | Performed by: FAMILY MEDICINE

## 2019-02-04 RX ORDER — MELOXICAM 15 MG/1
TABLET ORAL
Qty: 30 TABLET | Refills: 2 | Status: SHIPPED | OUTPATIENT
Start: 2019-02-04 | End: 2019-03-18 | Stop reason: SDUPTHER

## 2019-02-04 NOTE — PROGRESS NOTES
Subjective   Kamlesh Moser is a 68 y.o. male.     Fall   The accident occurred 3 to 5 days ago. The fall occurred while standing (on ice). He fell from a height of 3 to 5 ft. He landed on hard floor. The point of impact was the head. The pain is present in the head. The pain is mild.        The following portions of the patient's history were reviewed and updated as appropriate: allergies, current medications, past family history, past medical history, past social history, past surgical history and problem list.    Review of Systems   HENT: Nosebleeds: left.        Objective   Physical Exam   Constitutional: He is oriented to person, place, and time. He appears well-developed and well-nourished. No distress.   HENT:   Head: Normocephalic and atraumatic.   Nose: No mucosal edema, rhinorrhea, nose lacerations, sinus tenderness, nasal deformity, septal deviation or nasal septal hematoma. No epistaxis.       Neurological: He is alert and oriented to person, place, and time.   Skin: Skin is warm and dry. He is not diaphoretic.   Psychiatric: He has a normal mood and affect. His behavior is normal. Judgment and thought content normal.   Nursing note and vitals reviewed.      Assessment/Plan   Problems Addressed this Visit     None      Visit Diagnoses     Fall, initial encounter    -  Primary    Epistaxis                Current outpatient and discharge medications have been reconciled for the patient.  Reviewed by: MD alma Calvillo dc and juliann camilo

## 2019-03-12 RX ORDER — MONTELUKAST SODIUM 10 MG/1
TABLET ORAL
Qty: 30 TABLET | Refills: 3 | Status: SHIPPED | OUTPATIENT
Start: 2019-03-12 | End: 2019-06-18 | Stop reason: SDUPTHER

## 2019-03-12 RX ORDER — TRIAMTERENE AND HYDROCHLOROTHIAZIDE 37.5; 25 MG/1; MG/1
TABLET ORAL
Qty: 30 TABLET | Refills: 3 | Status: SHIPPED | OUTPATIENT
Start: 2019-03-12 | End: 2019-06-18 | Stop reason: SDUPTHER

## 2019-03-19 RX ORDER — MELOXICAM 15 MG/1
TABLET ORAL
Qty: 30 TABLET | Refills: 2 | Status: SHIPPED | OUTPATIENT
Start: 2019-03-19 | End: 2019-06-18 | Stop reason: SDUPTHER

## 2019-03-26 ENCOUNTER — OFFICE VISIT (OUTPATIENT)
Dept: OTOLARYNGOLOGY | Facility: CLINIC | Age: 69
End: 2019-03-26

## 2019-03-26 VITALS — BODY MASS INDEX: 33.18 KG/M2 | HEIGHT: 69 IN | WEIGHT: 224 LBS | RESPIRATION RATE: 18 BRPM

## 2019-03-26 DIAGNOSIS — H60.63 CHRONIC NON-INFECTIVE OTITIS EXTERNA OF BOTH EARS, UNSPECIFIED TYPE: Primary | ICD-10-CM

## 2019-03-26 PROCEDURE — 99212 OFFICE O/P EST SF 10 MIN: CPT | Performed by: OTOLARYNGOLOGY

## 2019-03-27 NOTE — PROGRESS NOTES
Subjective   Kamlesh Moser is a 68 y.o. male.       History of Present Illness   Patient is followed with chronic noninfective otitis externa.  Uses Synalar as needed. States since I saw him last he has not been diagnosed with any ear infections.       The following portions of the patient's history were reviewed and updated as appropriate: allergies, current medications, past family history, past medical history, past social history, past surgical history and problem list.     reports that he has quit smoking. He has never used smokeless tobacco. He reports that he drinks alcohol. He reports that he does not use drugs.   Patient is not a tobacco user and has not been counseled for use of tobacco products      Review of Systems        Objective   Physical Exam  Periodically external ears no deformity.  Right ear canal shows modest squamous debris that is cleaned under the microscope using instrumentation.  Tympanic membrane is intact and clear.  Left ear shows a good bit of crusted material medially and overlying the tympanic membrane all of which is removed under the microscope using instrumentation.  Tympanic membrane is intact and clear.      Assessment/Plan   Kamlesh was seen today for follow-up.    Diagnoses and all orders for this visit:    Chronic non-infective otitis externa of both ears, unspecified type      Plan: Ears cleaned as described above.  Continue Synalar use as needed.  Return in 1 year, call sooner for problems.

## 2019-06-18 ENCOUNTER — OFFICE VISIT (OUTPATIENT)
Dept: FAMILY MEDICINE CLINIC | Facility: CLINIC | Age: 69
End: 2019-06-18

## 2019-06-18 ENCOUNTER — APPOINTMENT (OUTPATIENT)
Dept: LAB | Facility: HOSPITAL | Age: 69
End: 2019-06-18

## 2019-06-18 VITALS
OXYGEN SATURATION: 98 % | HEIGHT: 69 IN | HEART RATE: 86 BPM | BODY MASS INDEX: 32.73 KG/M2 | SYSTOLIC BLOOD PRESSURE: 130 MMHG | WEIGHT: 221 LBS | DIASTOLIC BLOOD PRESSURE: 80 MMHG

## 2019-06-18 DIAGNOSIS — I10 ESSENTIAL HYPERTENSION: Primary | ICD-10-CM

## 2019-06-18 DIAGNOSIS — K21.9 GASTROESOPHAGEAL REFLUX DISEASE WITHOUT ESOPHAGITIS: ICD-10-CM

## 2019-06-18 DIAGNOSIS — E78.00 PURE HYPERCHOLESTEROLEMIA: ICD-10-CM

## 2019-06-18 LAB
ALBUMIN SERPL-MCNC: 4.6 G/DL (ref 3.5–5.2)
ALBUMIN/GLOB SERPL: 1.9 G/DL
ALP SERPL-CCNC: 57 U/L (ref 39–117)
ALT SERPL W P-5'-P-CCNC: 34 U/L (ref 1–41)
ANION GAP SERPL CALCULATED.3IONS-SCNC: 11.9 MMOL/L
AST SERPL-CCNC: 21 U/L (ref 1–40)
BILIRUB SERPL-MCNC: 0.4 MG/DL (ref 0.2–1.2)
BUN BLD-MCNC: 16 MG/DL (ref 8–23)
BUN/CREAT SERPL: 14.2 (ref 7–25)
CALCIUM SPEC-SCNC: 9.6 MG/DL (ref 8.6–10.5)
CHLORIDE SERPL-SCNC: 98 MMOL/L (ref 98–107)
CHOLEST SERPL-MCNC: 161 MG/DL (ref 0–200)
CO2 SERPL-SCNC: 29.1 MMOL/L (ref 22–29)
CREAT BLD-MCNC: 1.13 MG/DL (ref 0.76–1.27)
GFR SERPL CREATININE-BSD FRML MDRD: 64 ML/MIN/1.73
GLOBULIN UR ELPH-MCNC: 2.4 GM/DL
GLUCOSE BLD-MCNC: 107 MG/DL (ref 65–99)
HDLC SERPL-MCNC: 54 MG/DL (ref 40–60)
LDLC SERPL CALC-MCNC: 63 MG/DL (ref 0–100)
LDLC/HDLC SERPL: 1.17 {RATIO}
MAGNESIUM SERPL-MCNC: 2.2 MG/DL (ref 1.6–2.4)
POTASSIUM BLD-SCNC: 4.1 MMOL/L (ref 3.5–5.2)
PROT SERPL-MCNC: 7 G/DL (ref 6–8.5)
SODIUM BLD-SCNC: 139 MMOL/L (ref 136–145)
TRIGL SERPL-MCNC: 219 MG/DL (ref 0–150)
VIT B12 BLD-MCNC: 592 PG/ML (ref 211–946)
VLDLC SERPL-MCNC: 43.8 MG/DL (ref 5–40)

## 2019-06-18 PROCEDURE — 83735 ASSAY OF MAGNESIUM: CPT | Performed by: FAMILY MEDICINE

## 2019-06-18 PROCEDURE — 82607 VITAMIN B-12: CPT | Performed by: FAMILY MEDICINE

## 2019-06-18 PROCEDURE — 80053 COMPREHEN METABOLIC PANEL: CPT | Performed by: FAMILY MEDICINE

## 2019-06-18 PROCEDURE — 80061 LIPID PANEL: CPT | Performed by: FAMILY MEDICINE

## 2019-06-18 PROCEDURE — 99214 OFFICE O/P EST MOD 30 MIN: CPT | Performed by: FAMILY MEDICINE

## 2019-06-18 PROCEDURE — 36415 COLL VENOUS BLD VENIPUNCTURE: CPT | Performed by: FAMILY MEDICINE

## 2019-06-18 RX ORDER — TRIAMTERENE AND HYDROCHLOROTHIAZIDE 37.5; 25 MG/1; MG/1
1 TABLET ORAL DAILY
Qty: 30 TABLET | Refills: 3 | Status: SHIPPED | OUTPATIENT
Start: 2019-06-18 | End: 2019-12-13 | Stop reason: SDUPTHER

## 2019-06-18 RX ORDER — PRAVASTATIN SODIUM 40 MG
40 TABLET ORAL NIGHTLY
Qty: 30 TABLET | Refills: 11 | Status: SHIPPED | OUTPATIENT
Start: 2019-06-18 | End: 2019-12-18 | Stop reason: SDUPTHER

## 2019-06-18 RX ORDER — TAMSULOSIN HYDROCHLORIDE 0.4 MG/1
1 CAPSULE ORAL DAILY
Qty: 30 CAPSULE | Refills: 4 | Status: SHIPPED | OUTPATIENT
Start: 2019-06-18 | End: 2019-07-16 | Stop reason: SDUPTHER

## 2019-06-18 RX ORDER — MELOXICAM 15 MG/1
TABLET ORAL
Qty: 30 TABLET | Refills: 2 | Status: SHIPPED | OUTPATIENT
Start: 2019-06-18 | End: 2019-12-18 | Stop reason: SDUPTHER

## 2019-06-18 RX ORDER — MONTELUKAST SODIUM 10 MG/1
10 TABLET ORAL
Qty: 30 TABLET | Refills: 3 | Status: SHIPPED | OUTPATIENT
Start: 2019-06-18 | End: 2019-12-18 | Stop reason: SDUPTHER

## 2019-06-18 RX ORDER — OMEPRAZOLE 20 MG/1
20 CAPSULE, DELAYED RELEASE ORAL DAILY
Qty: 30 CAPSULE | Refills: 11 | Status: SHIPPED | OUTPATIENT
Start: 2019-06-18 | End: 2019-07-16 | Stop reason: SDUPTHER

## 2019-06-18 RX ORDER — FINASTERIDE 5 MG/1
5 TABLET, FILM COATED ORAL DAILY
Qty: 30 TABLET | Refills: 5 | Status: SHIPPED | OUTPATIENT
Start: 2019-06-18 | End: 2019-12-18 | Stop reason: SDUPTHER

## 2019-06-18 NOTE — PROGRESS NOTES
Subjective   Kamlesh Moser is a 68 y.o. male.     Hyperlipidemia   This is a chronic problem. The current episode started more than 1 year ago. The problem is controlled. Recent lipid tests were reviewed and are normal. Associated symptoms include shortness of breath (ELIZABETH, black lung). Pertinent negatives include no chest pain or myalgias.   Hypertension   This is a chronic problem. The current episode started more than 1 year ago. The problem has been waxing and waning since onset. The problem is controlled (fatigue if BP lower). Associated symptoms include shortness of breath (ELIZABETH, black lung). Pertinent negatives include no chest pain, orthopnea, palpitations, peripheral edema or PND.   Heartburn   He reports no chest pain or no heartburn. This is a chronic problem. The current episode started more than 1 year ago. The problem occurs rarely.   Arthritis   Presents for follow-up ([facility] INJECTIONS IN [place]) visit. He complains of pain. The symptoms have been stable. Affected locations include the left knee and right knee. His pain is at a severity of 0/10.        The following portions of the patient's history were reviewed and updated as appropriate: allergies, current medications, past family history, past medical history, past social history, past surgical history and problem list.    Review of Systems   HENT: Negative for hearing loss.    Respiratory: Positive for shortness of breath (ELIZABETH, black lung).    Cardiovascular: Negative for chest pain, palpitations, orthopnea and PND.   Gastrointestinal: Negative for heartburn.   Musculoskeletal: Positive for arthritis. Negative for myalgias.       Objective   Physical Exam   Constitutional: He is oriented to person, place, and time. He appears well-developed and well-nourished. No distress.   HENT:   Head: Normocephalic and atraumatic.   Right Ear: Tympanic membrane is bulging. Decreased hearing is noted.   Left Ear: Tympanic membrane is injected and  bulging. Decreased hearing is noted.   Nose: Mucosal edema and rhinorrhea present.   Mouth/Throat: Uvula is midline, oropharynx is clear and moist and mucous membranes are normal.   Cardiovascular: Normal rate, regular rhythm, normal heart sounds and intact distal pulses. Exam reveals no gallop and no friction rub.   No murmur heard.  Pulmonary/Chest: Effort normal and breath sounds normal. No respiratory distress. He has no wheezes. He has no rales. He exhibits no tenderness.   Abdominal: Soft. Bowel sounds are normal. There is no tenderness.   Musculoskeletal: He exhibits no edema or tenderness.     Vascular Status -  His right foot exhibits abnormal foot vasculature .Right foot edema: trace bilateral edema. His left foot exhibits abnormal foot vasculature  and abnormal foot edema.  Lymphadenopathy:        Head (right side): No submental and no submandibular adenopathy present.        Head (left side): No submental and no submandibular adenopathy present.     He has no cervical adenopathy.   Neurological: He is alert and oriented to person, place, and time.   Skin: Skin is warm and dry. He is not diaphoretic.   Psychiatric: He has a normal mood and affect. His behavior is normal. Judgment and thought content normal.   Nursing note and vitals reviewed.      Assessment/Plan   Problems Addressed this Visit        Cardiovascular and Mediastinum    Hyperlipidemia (Chronic)    Relevant Medications    pravastatin (PRAVACHOL) 40 MG tablet    Other Relevant Orders    Lipid Panel    Hypertension - Primary (Chronic)    Relevant Medications    triamterene-hydrochlorothiazide (MAXZIDE-25) 37.5-25 MG per tablet    Other Relevant Orders    Comprehensive Metabolic Panel       Digestive    GERD (gastroesophageal reflux disease) (Chronic)    Relevant Medications    omeprazole (priLOSEC) 20 MG capsule    Other Relevant Orders    Vitamin B12    Magnesium        Patient has been erroneously marked as diabetic. Based on the available  clinical information, he does not have diabetes and should therefore be excluded from diabetic health maintenance and quality measures for the remainder of the reporting period.       continues weight loss for fatty liver recommended

## 2019-06-24 NOTE — PROGRESS NOTES
LEFT MS FOR PT TO CALL ME BACK.  Sending letter today as well since the patient has not returned my calls

## 2019-07-16 RX ORDER — TAMSULOSIN HYDROCHLORIDE 0.4 MG/1
CAPSULE ORAL
Qty: 30 CAPSULE | Refills: 3 | Status: SHIPPED | OUTPATIENT
Start: 2019-07-16 | End: 2019-12-18 | Stop reason: SDUPTHER

## 2019-07-16 RX ORDER — OMEPRAZOLE 20 MG/1
CAPSULE, DELAYED RELEASE ORAL
Qty: 30 CAPSULE | Refills: 10 | Status: SHIPPED | OUTPATIENT
Start: 2019-07-16 | End: 2019-12-18 | Stop reason: SDUPTHER

## 2019-12-13 RX ORDER — TRIAMTERENE AND HYDROCHLOROTHIAZIDE 37.5; 25 MG/1; MG/1
TABLET ORAL
Qty: 30 TABLET | Refills: 2 | Status: SHIPPED | OUTPATIENT
Start: 2019-12-13 | End: 2019-12-18 | Stop reason: SDUPTHER

## 2019-12-18 ENCOUNTER — LAB (OUTPATIENT)
Dept: LAB | Facility: HOSPITAL | Age: 69
End: 2019-12-18

## 2019-12-18 ENCOUNTER — OFFICE VISIT (OUTPATIENT)
Dept: FAMILY MEDICINE CLINIC | Facility: CLINIC | Age: 69
End: 2019-12-18

## 2019-12-18 VITALS
HEIGHT: 69 IN | SYSTOLIC BLOOD PRESSURE: 140 MMHG | DIASTOLIC BLOOD PRESSURE: 88 MMHG | BODY MASS INDEX: 32.88 KG/M2 | HEART RATE: 92 BPM | OXYGEN SATURATION: 99 % | WEIGHT: 222 LBS

## 2019-12-18 DIAGNOSIS — Z12.5 PROSTATE CANCER SCREENING: ICD-10-CM

## 2019-12-18 DIAGNOSIS — Z00.00 MEDICARE ANNUAL WELLNESS VISIT, SUBSEQUENT: ICD-10-CM

## 2019-12-18 DIAGNOSIS — K76.0 FATTY LIVER: ICD-10-CM

## 2019-12-18 DIAGNOSIS — H65.03 NON-RECURRENT ACUTE SEROUS OTITIS MEDIA OF BOTH EARS: ICD-10-CM

## 2019-12-18 DIAGNOSIS — K21.9 GASTROESOPHAGEAL REFLUX DISEASE WITHOUT ESOPHAGITIS: ICD-10-CM

## 2019-12-18 DIAGNOSIS — E78.00 PURE HYPERCHOLESTEROLEMIA: ICD-10-CM

## 2019-12-18 DIAGNOSIS — E66.09 CLASS 1 OBESITY DUE TO EXCESS CALORIES WITH SERIOUS COMORBIDITY AND BODY MASS INDEX (BMI) OF 32.0 TO 32.9 IN ADULT: ICD-10-CM

## 2019-12-18 DIAGNOSIS — I10 ESSENTIAL HYPERTENSION: Primary | ICD-10-CM

## 2019-12-18 LAB
ALBUMIN SERPL-MCNC: 4.7 G/DL (ref 3.5–5.2)
ALBUMIN/GLOB SERPL: 1.7 G/DL
ALP SERPL-CCNC: 55 U/L (ref 39–117)
ALT SERPL W P-5'-P-CCNC: 34 U/L (ref 1–41)
ANION GAP SERPL CALCULATED.3IONS-SCNC: 12.4 MMOL/L (ref 5–15)
AST SERPL-CCNC: 21 U/L (ref 1–40)
BILIRUB SERPL-MCNC: 0.4 MG/DL (ref 0.2–1.2)
BUN BLD-MCNC: 15 MG/DL (ref 8–23)
BUN/CREAT SERPL: 16 (ref 7–25)
CALCIUM SPEC-SCNC: 9.4 MG/DL (ref 8.6–10.5)
CHLORIDE SERPL-SCNC: 96 MMOL/L (ref 98–107)
CHOLEST SERPL-MCNC: 158 MG/DL (ref 0–200)
CO2 SERPL-SCNC: 27.6 MMOL/L (ref 22–29)
CREAT BLD-MCNC: 0.94 MG/DL (ref 0.76–1.27)
GFR SERPL CREATININE-BSD FRML MDRD: 79 ML/MIN/1.73
GLOBULIN UR ELPH-MCNC: 2.8 GM/DL
GLUCOSE BLD-MCNC: 98 MG/DL (ref 65–99)
HDLC SERPL-MCNC: 54 MG/DL (ref 40–60)
LDLC SERPL CALC-MCNC: 63 MG/DL (ref 0–100)
LDLC/HDLC SERPL: 1.16 {RATIO}
MAGNESIUM SERPL-MCNC: 2.2 MG/DL (ref 1.6–2.4)
POTASSIUM BLD-SCNC: 4.2 MMOL/L (ref 3.5–5.2)
PROT SERPL-MCNC: 7.5 G/DL (ref 6–8.5)
PSA SERPL-MCNC: 0.23 NG/ML (ref 0–4)
SODIUM BLD-SCNC: 136 MMOL/L (ref 136–145)
TRIGL SERPL-MCNC: 206 MG/DL (ref 0–150)
VIT B12 BLD-MCNC: 634 PG/ML (ref 211–946)
VLDLC SERPL-MCNC: 41.2 MG/DL (ref 5–40)

## 2019-12-18 PROCEDURE — 80053 COMPREHEN METABOLIC PANEL: CPT | Performed by: FAMILY MEDICINE

## 2019-12-18 PROCEDURE — 36415 COLL VENOUS BLD VENIPUNCTURE: CPT | Performed by: FAMILY MEDICINE

## 2019-12-18 PROCEDURE — 99214 OFFICE O/P EST MOD 30 MIN: CPT | Performed by: FAMILY MEDICINE

## 2019-12-18 PROCEDURE — G0439 PPPS, SUBSEQ VISIT: HCPCS | Performed by: FAMILY MEDICINE

## 2019-12-18 PROCEDURE — G0103 PSA SCREENING: HCPCS

## 2019-12-18 PROCEDURE — 82607 VITAMIN B-12: CPT | Performed by: FAMILY MEDICINE

## 2019-12-18 PROCEDURE — 80061 LIPID PANEL: CPT | Performed by: FAMILY MEDICINE

## 2019-12-18 PROCEDURE — 83735 ASSAY OF MAGNESIUM: CPT | Performed by: FAMILY MEDICINE

## 2019-12-18 RX ORDER — TAMSULOSIN HYDROCHLORIDE 0.4 MG/1
1 CAPSULE ORAL DAILY
Qty: 30 CAPSULE | Refills: 3 | Status: SHIPPED | OUTPATIENT
Start: 2019-12-18 | End: 2020-04-13

## 2019-12-18 RX ORDER — SULFAMETHOXAZOLE AND TRIMETHOPRIM 800; 160 MG/1; MG/1
1 TABLET ORAL 2 TIMES DAILY
Qty: 14 TABLET | Refills: 0 | Status: SHIPPED | OUTPATIENT
Start: 2019-12-18 | End: 2019-12-25

## 2019-12-18 RX ORDER — PRAVASTATIN SODIUM 40 MG
40 TABLET ORAL NIGHTLY
Qty: 30 TABLET | Refills: 11 | Status: SHIPPED | OUTPATIENT
Start: 2019-12-18 | End: 2020-07-13

## 2019-12-18 RX ORDER — MONTELUKAST SODIUM 10 MG/1
10 TABLET ORAL
Qty: 30 TABLET | Refills: 3 | Status: SHIPPED | OUTPATIENT
Start: 2019-12-18 | End: 2020-04-13

## 2019-12-18 RX ORDER — TRIAMTERENE AND HYDROCHLOROTHIAZIDE 37.5; 25 MG/1; MG/1
1 TABLET ORAL DAILY
Qty: 30 TABLET | Refills: 2 | Status: SHIPPED | OUTPATIENT
Start: 2019-12-18 | End: 2020-07-06

## 2019-12-18 RX ORDER — OMEPRAZOLE 20 MG/1
20 CAPSULE, DELAYED RELEASE ORAL DAILY
Qty: 30 CAPSULE | Refills: 10 | Status: SHIPPED | OUTPATIENT
Start: 2019-12-18 | End: 2020-12-15 | Stop reason: SDUPTHER

## 2019-12-18 RX ORDER — FINASTERIDE 5 MG/1
5 TABLET, FILM COATED ORAL DAILY
Qty: 30 TABLET | Refills: 5 | Status: SHIPPED | OUTPATIENT
Start: 2019-12-18 | End: 2020-08-04

## 2019-12-18 RX ORDER — MELOXICAM 15 MG/1
TABLET ORAL
Qty: 30 TABLET | Refills: 2 | Status: SHIPPED | OUTPATIENT
Start: 2019-12-18 | End: 2020-01-29

## 2019-12-18 NOTE — PROGRESS NOTES
The ABCs of the Annual Wellness Visit  Subsequent Medicare Wellness Visit    Chief Complaint   Patient presents with   • Annual Exam     MWV       Subjective   History of Present Illness:  Kamlesh Moser is a 69 y.o. male who presents for a Subsequent Medicare Wellness Visit.    HEALTH RISK ASSESSMENT    Recent Hospitalizations:  No hospitalization(s) within the last year.    Current Medical Providers:  Patient Care Team:  Rafa Vargas MD as PCP - General  Janes Burrows MD as Surgeon (Orthopedic Surgery)  Mack Yeager MD as Surgeon (Otolaryngology)    Smoking Status:  Social History     Tobacco Use   Smoking Status Former Smoker   Smokeless Tobacco Never Used       Alcohol Consumption:  Social History     Substance and Sexual Activity   Alcohol Use Yes    Comment: social       Depression Screen:   PHQ-2/PHQ-9 Depression Screening 2/4/2019   Little interest or pleasure in doing things 0   Feeling down, depressed, or hopeless 0   Trouble falling or staying asleep, or sleeping too much -   Feeling tired or having little energy -   Poor appetite or overeating -   Feeling bad about yourself - or that you are a failure or have let yourself or your family down -   Trouble concentrating on things, such as reading the newspaper or watching television -   Moving or speaking so slowly that other people could have noticed. Or the opposite - being so fidgety or restless that you have been moving around a lot more than usual -   Thoughts that you would be better off dead, or of hurting yourself in some way -   Total Score 0   If you checked off any problems, how difficult have these problems made it for you to do your work, take care of things at home, or get along with other people? -       Fall Risk Screen:  RASHEEDADI Fall Risk Assessment has not been completed.    Health Habits and Functional and Cognitive Screening:  Functional & Cognitive Status 7/20/2017   Do you have difficulty preparing food and  eating? No   Do you have difficulty bathing yourself, getting dressed or grooming yourself? No   Do you have difficulty using the toilet? No   Do you have difficulty moving around from place to place? No   Current Diet Well Balanced Diet   Dental Exam Not up to date   Eye Exam Up to date   Exercise (times per week) 5 times per week   Current Exercise Activities Include Walking   Do you need help using the phone?  No   Are you deaf or do you have serious difficulty hearing?  Yes   Do you need help with transportation? No   Do you need help shopping? No   Do you need help preparing meals?  No   Do you need help with housework?  Yes   Do you need help with laundry? No   Do you need help taking your medications? No   Do you need help managing money? No   Do you have difficulty concentrating, remembering or making decisions? No         Does the patient have evidence of cognitive impairment? No    Asprin use counseling:Does not need ASA (and currently is not on it)    Age-appropriate Screening Schedule:  Refer to the list below for future screening recommendations based on patient's age, sex and/or medical conditions. Orders for these recommended tests are listed in the plan section. The patient has been provided with a written plan.    Health Maintenance   Topic Date Due   • ZOSTER VACCINE (2 of 3) 02/26/2014   • HEMOGLOBIN A1C  12/16/2016   • LIPID PANEL  06/18/2020   • COLONOSCOPY  01/16/2022   • TDAP/TD VACCINES (2 - Td) 06/19/2024   • INFLUENZA VACCINE  Completed   • PNEUMOCOCCAL VACCINES (65+ LOW/MEDIUM RISK)  Completed   • DIABETIC FOOT EXAM  Discontinued   • DIABETIC EYE EXAM  Discontinued   • URINE MICROALBUMIN  Discontinued          The following portions of the patient's history were reviewed and updated as appropriate: allergies, current medications, past family history, past medical history, past social history, past surgical history and problem list.    Outpatient Medications Prior to Visit   Medication Sig  Dispense Refill   • aspirin 325 MG tablet Take 325 mg by mouth daily.     • AZOPT 1 % ophthalmic suspension Administer 1 drop to both eyes 3 (Three) Times a Day. 10 mL 5   • bacitracin 500 UNIT/GM ointment Apply  topically to the appropriate area as directed 2 (Two) Times a Day. 14 g 0   • finasteride (PROSCAR) 5 MG tablet Take 1 tablet by mouth Daily. 30 tablet 5   • meloxicam (MOBIC) 15 MG tablet TAKE 1 TABLET DAILY. 30 tablet 2   • montelukast (SINGULAIR) 10 MG tablet Take 1 tablet by mouth every night at bedtime. 30 tablet 3   • omeprazole (priLOSEC) 20 MG capsule TAKE 1 CAPSULE  DAILY FOR STOMACH. 30 capsule 10   • pravastatin (PRAVACHOL) 40 MG tablet Take 1 tablet by mouth Every Night. 30 tablet 11   • tamsulosin (FLOMAX) 0.4 MG capsule 24 hr capsule TAKE 1 CAPSULE DAILY 30 capsule 3   • triamterene-hydrochlorothiazide (MAXZIDE-25) 37.5-25 MG per tablet TAKE 1 TABLET DAILY FOR BLOOD PRESSURE. 30 tablet 2   • fluocinolone (SYNALAR) 0.01 % external solution Apply  topically 2 (Two) Times a Day. 3 drops each ear BID x7 Days then PRN 90 mL 0     No facility-administered medications prior to visit.        Patient Active Problem List   Diagnosis   • Hiatal hernia   • Arthritis   • GERD (gastroesophageal reflux disease)   • Hyperlipidemia   • Hypertension   • Fracture of pelvis (CMS/HCC)   • Primary osteoarthritis of both knees   • Primary open angle glaucoma of left eye, mild stage   • Cataract   • Vitamin D deficiency   • Temporomandibular joint disorder   • Osteoarthritis of multiple joints   • Fatty liver   • Gallstones       Advanced Care Planning:  Patient does not have an advance directive - information provided to the patient today    Review of Systems    Compared to one year ago, the patient feels his physical health is better.  Compared to one year ago, the patient feels his mental health is the same.    Reviewed chart for potential of high risk medication in the elderly: yes  Reviewed chart for potential  "of harmful drug interactions in the elderly:yes    Objective         Vitals:    12/18/19 0840   BP: 140/88   Pulse: 92   SpO2: 99%   Weight: 101 kg (222 lb)   Height: 175.3 cm (69\")   PainSc: 0-No pain       Body mass index is 32.78 kg/m².  Discussed the patient's BMI with him. The BMI is above average; BMI management plan is completed.    Physical Exam          Assessment/Plan   Medicare Risks and Personalized Health Plan  CMS Preventative Services Quick Reference  Advance Directive Discussion    The above risks/problems have been discussed with the patient.  Pertinent information has been shared with the patient in the After Visit Summary.  Follow up plans and orders are seen below in the Assessment/Plan Section.    There are no diagnoses linked to this encounter.  Follow Up:  No follow-ups on file.     An After Visit Summary and PPPS were given to the patient.             "

## 2019-12-18 NOTE — PATIENT INSTRUCTIONS

## 2019-12-18 NOTE — PROGRESS NOTES
Subjective   Kamlesh Moser is a 69 y.o. male.     Hyperlipidemia   This is a chronic problem. The current episode started more than 1 year ago. The problem is controlled. Recent lipid tests were reviewed and are normal. Exacerbating diseases include obesity. Associated symptoms include shortness of breath (ELIZABETH, black lung). Pertinent negatives include no chest pain or myalgias.   Hypertension   This is a chronic problem. The current episode started more than 1 year ago. The problem has been waxing and waning since onset. The problem is controlled (fatigue if BP lower). Associated symptoms include shortness of breath (ELIZABETH, black lung). Pertinent negatives include no chest pain, orthopnea, palpitations, peripheral edema or PND.   Heartburn   He reports no chest pain or no heartburn. This is a chronic problem. The current episode started more than 1 year ago. The problem occurs rarely.   Obesity   This is a chronic problem. The current episode started more than 1 year ago. The problem occurs constantly. The problem has been unchanged. Pertinent negatives include no chest pain or myalgias.   Earache    There is pain in the left ear. This is a new problem. The current episode started in the past 7 days. The problem occurs constantly. The problem has been unchanged. There has been no fever. The pain is mild. Pertinent negatives include no hearing loss.        The following portions of the patient's history were reviewed and updated as appropriate: allergies, current medications, past family history, past medical history, past social history, past surgical history and problem list.    Review of Systems   HENT: Positive for ear pain. Negative for hearing loss.    Respiratory: Positive for shortness of breath (ELIZABETH, black lung).    Cardiovascular: Negative for chest pain, palpitations, orthopnea and PND.   Gastrointestinal: Negative for heartburn.   Musculoskeletal: Negative for myalgias.       Objective   Physical Exam  "  Constitutional: He is oriented to person, place, and time. He appears well-developed and well-nourished. No distress.   HENT:   Head: Normocephalic and atraumatic.   Right Ear: Tympanic membrane is retracted. Decreased hearing is noted.   Left Ear: Tympanic membrane is injected and retracted. Decreased hearing is noted.   Nose: Mucosal edema and rhinorrhea present.   Mouth/Throat: Uvula is midline, oropharynx is clear and moist and mucous membranes are normal.   Cardiovascular: Normal rate, regular rhythm, normal heart sounds and intact distal pulses. Exam reveals no gallop and no friction rub.   No murmur heard.  Pulmonary/Chest: Effort normal and breath sounds normal. No respiratory distress. He has no wheezes. He has no rales. He exhibits no tenderness.   Abdominal: Soft. Bowel sounds are normal. There is no tenderness.   Musculoskeletal: He exhibits no edema or tenderness.     Vascular Status -  His right foot exhibits abnormal foot vasculature .Right foot edema: trace bilateral edema. His left foot exhibits abnormal foot vasculature  and abnormal foot edema.  Lymphadenopathy:        Head (right side): No submental and no submandibular adenopathy present.        Head (left side): No submental and no submandibular adenopathy present.     He has no cervical adenopathy.   Neurological: He is alert and oriented to person, place, and time.   Skin: Skin is warm and dry. He is not diaphoretic.   Psychiatric: He has a normal mood and affect. His behavior is normal. Judgment and thought content normal.   Nursing note and vitals reviewed.    /88   Pulse 92   Ht 175.3 cm (69\")   Wt 101 kg (222 lb)   SpO2 99%   BMI 32.78 kg/m²     Assessment/Plan   Problems Addressed this Visit        Cardiovascular and Mediastinum    Hyperlipidemia (Chronic)    Relevant Medications    pravastatin (PRAVACHOL) 40 MG tablet    Other Relevant Orders    Lipid Panel    Hypertension - Primary (Chronic)    Relevant Medications    " triamterene-hydrochlorothiazide (MAXZIDE-25) 37.5-25 MG per tablet    Other Relevant Orders    Comprehensive Metabolic Panel       Digestive    GERD (gastroesophageal reflux disease) (Chronic)    Relevant Medications    omeprazole (priLOSEC) 20 MG capsule    Other Relevant Orders    Vitamin B12    Magnesium    Fatty liver      Other Visit Diagnoses     Prostate cancer screening        Relevant Orders    PSA Screen    Class 1 obesity due to excess calories with serious comorbidity and body mass index (BMI) of 32.0 to 32.9 in adult        Medicare annual wellness visit, subsequent        Non-recurrent acute serous otitis media of both ears            Patient has been erroneously marked as diabetic. Based on the available clinical information, he does not have diabetes and should therefore be excluded from diabetic health maintenance and quality measures for the remainder of the reporting period.d

## 2020-01-29 RX ORDER — MELOXICAM 15 MG/1
TABLET ORAL
Qty: 30 TABLET | Refills: 1 | Status: SHIPPED | OUTPATIENT
Start: 2020-01-29 | End: 2020-04-13

## 2020-03-26 ENCOUNTER — TELEPHONE (OUTPATIENT)
Dept: FAMILY MEDICINE CLINIC | Facility: CLINIC | Age: 70
End: 2020-03-26

## 2020-03-26 RX ORDER — GUAIFENESIN 600 MG/1
1200 TABLET, EXTENDED RELEASE ORAL 2 TIMES DAILY
Qty: 40 TABLET | Refills: 0 | Status: SHIPPED | OUTPATIENT
Start: 2020-03-26 | End: 2020-04-05

## 2020-03-26 RX ORDER — FLUTICASONE PROPIONATE 50 MCG
2 SPRAY, SUSPENSION (ML) NASAL DAILY
Qty: 9.9 ML | Refills: 1 | Status: SHIPPED | OUTPATIENT
Start: 2020-03-26 | End: 2020-04-25

## 2020-03-26 RX ORDER — SULFAMETHOXAZOLE AND TRIMETHOPRIM 800; 160 MG/1; MG/1
1 TABLET ORAL 2 TIMES DAILY
Qty: 14 TABLET | Refills: 0 | Status: SHIPPED | OUTPATIENT
Start: 2020-03-26 | End: 2020-04-02

## 2020-03-26 NOTE — TELEPHONE ENCOUNTER
Kamlesh thinks he is getting a possible sinus infection.  Said, his ear are stopped up and he can't hear with his hearing aids.

## 2020-04-13 RX ORDER — MELOXICAM 15 MG/1
TABLET ORAL
Qty: 30 TABLET | Refills: 1 | Status: SHIPPED | OUTPATIENT
Start: 2020-04-13 | End: 2020-09-09

## 2020-04-13 RX ORDER — TAMSULOSIN HYDROCHLORIDE 0.4 MG/1
CAPSULE ORAL
Qty: 30 CAPSULE | Refills: 3 | Status: SHIPPED | OUTPATIENT
Start: 2020-04-13 | End: 2020-09-09

## 2020-04-13 RX ORDER — MONTELUKAST SODIUM 10 MG/1
TABLET ORAL
Qty: 30 TABLET | Refills: 2 | Status: SHIPPED | OUTPATIENT
Start: 2020-04-13 | End: 2020-08-04

## 2020-05-12 ENCOUNTER — OFFICE VISIT (OUTPATIENT)
Dept: OTOLARYNGOLOGY | Facility: CLINIC | Age: 70
End: 2020-05-12

## 2020-05-12 VITALS — OXYGEN SATURATION: 98 % | BODY MASS INDEX: 32.44 KG/M2 | WEIGHT: 219 LBS | HEIGHT: 69 IN | TEMPERATURE: 97.9 F

## 2020-05-12 DIAGNOSIS — H60.63 CHRONIC NON-INFECTIVE OTITIS EXTERNA OF BOTH EARS, UNSPECIFIED TYPE: Primary | ICD-10-CM

## 2020-05-12 PROCEDURE — 99212 OFFICE O/P EST SF 10 MIN: CPT | Performed by: OTOLARYNGOLOGY

## 2020-05-12 NOTE — PROGRESS NOTES
Subjective   Kamlesh Moser is a 69 y.o. male.       History of Present Illness   Hearing aid wearer with chronic noninfective otitis externa.  Says he can tell his ears need cleaning.      The following portions of the patient's history were reviewed and updated as appropriate: allergies, current medications, past family history, past medical history, past social history, past surgical history and problem list.     reports that he has quit smoking. He has never used smokeless tobacco. He reports that he drinks alcohol. He reports that he does not use drugs.   Patient is not a tobacco user and has not been counseled for use of tobacco products      Review of Systems        Objective   Physical Exam  Ears: External ears no deformity.  Right ear canal shows modest squamous debris that is cleaned under the microscope using instrumentation.  Left ear canal shows a large amount of squamous debris that is cleaned under the microscope using instrumentation.  Both tympanic membranes are intact and clear      Assessment/Plan   Kamlesh was seen today for follow-up.    Diagnoses and all orders for this visit:    Chronic non-infective otitis externa of both ears, unspecified type      Plan: Ears cleaned as described above.  Return 1 year.

## 2020-06-17 ENCOUNTER — OFFICE VISIT (OUTPATIENT)
Dept: FAMILY MEDICINE CLINIC | Facility: CLINIC | Age: 70
End: 2020-06-17

## 2020-06-17 VITALS
DIASTOLIC BLOOD PRESSURE: 80 MMHG | SYSTOLIC BLOOD PRESSURE: 130 MMHG | WEIGHT: 215 LBS | BODY MASS INDEX: 31.84 KG/M2 | HEIGHT: 69 IN

## 2020-06-17 DIAGNOSIS — E78.00 PURE HYPERCHOLESTEROLEMIA: ICD-10-CM

## 2020-06-17 DIAGNOSIS — K21.9 GASTROESOPHAGEAL REFLUX DISEASE WITHOUT ESOPHAGITIS: ICD-10-CM

## 2020-06-17 DIAGNOSIS — I10 ESSENTIAL HYPERTENSION: Primary | ICD-10-CM

## 2020-06-17 PROCEDURE — 99213 OFFICE O/P EST LOW 20 MIN: CPT | Performed by: FAMILY MEDICINE

## 2020-06-17 RX ORDER — GINSENG 100 MG
CAPSULE ORAL 2 TIMES DAILY
Qty: 14 G | Refills: 0 | Status: SHIPPED | OUTPATIENT
Start: 2020-06-17 | End: 2020-12-15

## 2020-06-17 NOTE — PROGRESS NOTES
"Subjective   Kamlesh Moser is a 69 y.o. male.     Hyperlipidemia   This is a chronic problem. The current episode started more than 1 year ago. The problem is controlled. Recent lipid tests were reviewed and are normal. Exacerbating diseases include obesity. Pertinent negatives include no chest pain, myalgias or shortness of breath.   Hypertension   This is a chronic problem. The current episode started more than 1 year ago. The problem has been waxing and waning since onset. The problem is controlled (fatigue if BP lower). Pertinent negatives include no chest pain, orthopnea, palpitations, peripheral edema, PND or shortness of breath.   Heartburn   He reports no chest pain or no heartburn. This is a chronic problem. The current episode started more than 1 year ago. The problem occurs rarely.   Obesity   This is a chronic problem. The current episode started more than 1 year ago. The problem occurs constantly. The problem has been unchanged. Pertinent negatives include no chest pain or myalgias.        The following portions of the patient's history were reviewed and updated as appropriate: allergies, current medications, past family history, past medical history, past social history, past surgical history and problem list.    Review of Systems   Respiratory: Negative for shortness of breath.    Cardiovascular: Negative for chest pain, palpitations, orthopnea and PND.   Gastrointestinal: Negative for heartburn.   Musculoskeletal: Negative for myalgias.       Objective   Physical Exam   Constitutional: He is oriented to person, place, and time. No distress.   Neurological: He is alert and oriented to person, place, and time.   Psychiatric: He has a normal mood and affect. His behavior is normal. Judgment and thought content normal.     /80   Ht 175.3 cm (69\")   Wt 97.5 kg (215 lb)   BMI 31.75 kg/m²     Assessment/Plan   Problems Addressed this Visit        Cardiovascular and Mediastinum    " Hyperlipidemia (Chronic)    Hypertension - Primary (Chronic)       Digestive    GERD (gastroesophageal reflux disease) (Chronic)        You have chosen to receive care through a telephone visit. Do you consent to use a telephone visit for your medical care today? Yes  This visit has been rescheduled as a phone visit to comply with patient safety concerns in accordance with CDC recommendations. Total time of discussion was 12 minutes.

## 2020-07-06 RX ORDER — TRIAMTERENE AND HYDROCHLOROTHIAZIDE 37.5; 25 MG/1; MG/1
TABLET ORAL
Qty: 30 TABLET | Refills: 1 | Status: SHIPPED | OUTPATIENT
Start: 2020-07-06 | End: 2020-09-09

## 2020-07-13 RX ORDER — PRAVASTATIN SODIUM 40 MG
TABLET ORAL
Qty: 30 TABLET | Refills: 10 | Status: SHIPPED | OUTPATIENT
Start: 2020-07-13 | End: 2020-12-15 | Stop reason: SDUPTHER

## 2020-08-04 RX ORDER — FINASTERIDE 5 MG/1
TABLET, FILM COATED ORAL
Qty: 30 TABLET | Refills: 4 | Status: SHIPPED | OUTPATIENT
Start: 2020-08-04 | End: 2020-12-15 | Stop reason: SDUPTHER

## 2020-08-04 RX ORDER — MONTELUKAST SODIUM 10 MG/1
TABLET ORAL
Qty: 30 TABLET | Refills: 1 | Status: SHIPPED | OUTPATIENT
Start: 2020-08-04 | End: 2020-10-09

## 2020-09-09 RX ORDER — TRIAMTERENE AND HYDROCHLOROTHIAZIDE 37.5; 25 MG/1; MG/1
TABLET ORAL
Qty: 30 TABLET | Refills: 5 | Status: SHIPPED | OUTPATIENT
Start: 2020-09-09 | End: 2020-12-15 | Stop reason: SDUPTHER

## 2020-09-09 RX ORDER — TAMSULOSIN HYDROCHLORIDE 0.4 MG/1
CAPSULE ORAL
Qty: 30 CAPSULE | Refills: 5 | Status: SHIPPED | OUTPATIENT
Start: 2020-09-09 | End: 2020-12-15 | Stop reason: SDUPTHER

## 2020-09-09 RX ORDER — MELOXICAM 15 MG/1
TABLET ORAL
Qty: 30 TABLET | Refills: 5 | Status: SHIPPED | OUTPATIENT
Start: 2020-09-09 | End: 2020-12-15 | Stop reason: SDUPTHER

## 2020-10-09 RX ORDER — MONTELUKAST SODIUM 10 MG/1
TABLET ORAL
Qty: 30 TABLET | Refills: 2 | Status: SHIPPED | OUTPATIENT
Start: 2020-10-09 | End: 2020-12-15 | Stop reason: SDUPTHER

## 2020-12-08 ENCOUNTER — TELEPHONE (OUTPATIENT)
Dept: FAMILY MEDICINE CLINIC | Facility: CLINIC | Age: 70
End: 2020-12-08

## 2020-12-08 DIAGNOSIS — E78.00 PURE HYPERCHOLESTEROLEMIA: Primary | Chronic | ICD-10-CM

## 2020-12-08 DIAGNOSIS — K21.9 GASTROESOPHAGEAL REFLUX DISEASE WITHOUT ESOPHAGITIS: Chronic | ICD-10-CM

## 2020-12-08 DIAGNOSIS — I10 ESSENTIAL HYPERTENSION: Chronic | ICD-10-CM

## 2020-12-08 DIAGNOSIS — Z12.5 PROSTATE CANCER SCREENING: ICD-10-CM

## 2020-12-08 NOTE — TELEPHONE ENCOUNTER
Caller: Kamlesh Moser    Relationship: Self    Best call back number: [phone]      What orders are you requesting: PSA ORDER    In what timeframe would the patient need to come in: PATIENTS APPT. IS 12/15    Where will you receive your lab/imaging services: IN OFFICE

## 2020-12-08 NOTE — TELEPHONE ENCOUNTER
Last PSA 12/18/2019    I did call Mr. Moser and let him know that if he has the PSA done before 12/18 or 12/19 (which is a Saturday) that his Insurance may not pay.     He is Ok with seeing you on 12/15/2020 and having the labs completed on 12/21/2020    KRISTA Bauer

## 2020-12-15 ENCOUNTER — OFFICE VISIT (OUTPATIENT)
Dept: FAMILY MEDICINE CLINIC | Facility: CLINIC | Age: 70
End: 2020-12-15

## 2020-12-15 VITALS
WEIGHT: 220.4 LBS | BODY MASS INDEX: 32.64 KG/M2 | SYSTOLIC BLOOD PRESSURE: 126 MMHG | HEIGHT: 69 IN | DIASTOLIC BLOOD PRESSURE: 80 MMHG

## 2020-12-15 DIAGNOSIS — E78.00 PURE HYPERCHOLESTEROLEMIA: Chronic | ICD-10-CM

## 2020-12-15 DIAGNOSIS — I10 ESSENTIAL HYPERTENSION: Primary | Chronic | ICD-10-CM

## 2020-12-15 DIAGNOSIS — R39.14 BENIGN PROSTATIC HYPERPLASIA WITH INCOMPLETE BLADDER EMPTYING: Chronic | ICD-10-CM

## 2020-12-15 DIAGNOSIS — N40.1 BENIGN PROSTATIC HYPERPLASIA WITH INCOMPLETE BLADDER EMPTYING: Chronic | ICD-10-CM

## 2020-12-15 DIAGNOSIS — E66.09 CLASS 1 OBESITY DUE TO EXCESS CALORIES WITH SERIOUS COMORBIDITY AND BODY MASS INDEX (BMI) OF 32.0 TO 32.9 IN ADULT: ICD-10-CM

## 2020-12-15 DIAGNOSIS — K21.9 GASTROESOPHAGEAL REFLUX DISEASE WITHOUT ESOPHAGITIS: Chronic | ICD-10-CM

## 2020-12-15 PROBLEM — K76.0 FATTY LIVER: Chronic | Status: ACTIVE | Noted: 2017-06-23

## 2020-12-15 PROBLEM — K80.20 GALLSTONES: Chronic | Status: ACTIVE | Noted: 2017-06-23

## 2020-12-15 PROCEDURE — 99214 OFFICE O/P EST MOD 30 MIN: CPT | Performed by: FAMILY MEDICINE

## 2020-12-15 RX ORDER — MONTELUKAST SODIUM 10 MG/1
10 TABLET ORAL
Qty: 90 TABLET | Refills: 3 | Status: SHIPPED | OUTPATIENT
Start: 2020-12-15 | End: 2021-01-11 | Stop reason: SDUPTHER

## 2020-12-15 RX ORDER — PRAVASTATIN SODIUM 40 MG
40 TABLET ORAL DAILY
Qty: 90 TABLET | Refills: 3 | Status: SHIPPED | OUTPATIENT
Start: 2020-12-15 | End: 2021-12-14 | Stop reason: SDUPTHER

## 2020-12-15 RX ORDER — TAMSULOSIN HYDROCHLORIDE 0.4 MG/1
1 CAPSULE ORAL DAILY
Qty: 90 CAPSULE | Refills: 3 | Status: SHIPPED | OUTPATIENT
Start: 2020-12-15 | End: 2021-12-14 | Stop reason: SDUPTHER

## 2020-12-15 RX ORDER — MELOXICAM 15 MG/1
TABLET ORAL
Qty: 60 TABLET | Refills: 3 | Status: SHIPPED | OUTPATIENT
Start: 2020-12-15 | End: 2021-06-15 | Stop reason: SDUPTHER

## 2020-12-15 RX ORDER — OMEPRAZOLE 20 MG/1
20 CAPSULE, DELAYED RELEASE ORAL DAILY
Qty: 90 CAPSULE | Refills: 1 | Status: SHIPPED | OUTPATIENT
Start: 2020-12-15 | End: 2021-06-15 | Stop reason: SDUPTHER

## 2020-12-15 RX ORDER — TRIAMTERENE AND HYDROCHLOROTHIAZIDE 37.5; 25 MG/1; MG/1
1 TABLET ORAL DAILY
Qty: 90 TABLET | Refills: 3 | Status: SHIPPED | OUTPATIENT
Start: 2020-12-15 | End: 2021-12-14 | Stop reason: SDUPTHER

## 2020-12-15 RX ORDER — FINASTERIDE 5 MG/1
5 TABLET, FILM COATED ORAL DAILY
Qty: 90 TABLET | Refills: 3 | Status: SHIPPED | OUTPATIENT
Start: 2020-12-15 | End: 2021-01-11 | Stop reason: SDUPTHER

## 2020-12-15 NOTE — PROGRESS NOTES
Subjective   Kamlesh Moser is a 70 y.o. male.  Follow-up Dr. Vargas's office upon his residential.  Diagnoses hypertension hyperlipidemia obesity history of BPH history glaucoma arthritis.  In interim is gained all the weight unfortunately.  Developed a diastases abdominal wall from weight gain.  Visits ophthalmology routinely.  Time for lab work.  History noted.    History of Present Illness   HPI    The following portions of the patient's history were reviewed and updated as appropriate: allergies, current medications, past family history, past medical history, past social history, past surgical history and problem list.    Review of Systems  Review of Systems   Constitutional: Negative for activity change, appetite change, fatigue and unexpected weight change.   HENT: Negative for trouble swallowing and voice change.    Eyes: Negative for redness and visual disturbance.   Respiratory: Negative for cough and wheezing.    Cardiovascular: Negative for chest pain and palpitations.   Gastrointestinal: Positive for abdominal distention (Midline mild diastases no hernia). Negative for abdominal pain, constipation, diarrhea, nausea and vomiting.   Genitourinary: Negative for urgency.   Musculoskeletal: Negative for joint swelling.   Neurological: Negative for syncope and headaches.   Hematological: Negative for adenopathy.   Psychiatric/Behavioral: Negative for sleep disturbance.       Objective   Physical Exam  Physical Exam  Constitutional:       Appearance: He is well-developed.   HENT:      Head: Normocephalic.   Eyes:      Pupils: Pupils are equal, round, and reactive to light.   Neck:      Musculoskeletal: Normal range of motion.      Thyroid: No thyromegaly.   Cardiovascular:      Rate and Rhythm: Normal rate and regular rhythm.      Heart sounds: Normal heart sounds. No murmur. No friction rub. No gallop.    Pulmonary:      Breath sounds: Normal breath sounds.   Abdominal:      General: There is no  "distension.      Palpations: Abdomen is soft. There is no mass.      Tenderness: There is no abdominal tenderness.      Comments: Midline diastases no hernia   Musculoskeletal: Normal range of motion.   Skin:     General: Skin is warm and dry.   Neurological:      Mental Status: He is alert and oriented to person, place, and time.      Deep Tendon Reflexes: Reflexes are normal and symmetric.   Psychiatric:         Mood and Affect: Mood normal.         Speech: Speech normal.         Behavior: Behavior normal.         Cognition and Memory: Cognition normal.           Visit Vitals  /80   Ht 175.3 cm (69\")   Wt 100 kg (220 lb 6.4 oz)   BMI 32.55 kg/m²     Body mass index is 32.55 kg/m².      Assessment/Plan   Diagnoses and all orders for this visit:    1. Essential hypertension (Primary)  -     Comprehensive Metabolic Panel  -     Magnesium  -     triamterene-hydrochlorothiazide (MAXZIDE-25) 37.5-25 MG per tablet; Take 1 tablet by mouth Daily. for blood pressure  Dispense: 90 tablet; Refill: 3    2. Pure hypercholesterolemia  -     Lipid Panel With LDL / HDL Ratio    3. Benign prostatic hyperplasia with incomplete bladder emptying  -     PSA DIAGNOSTIC  -     tamsulosin (FLOMAX) 0.4 MG capsule 24 hr capsule; Take 1 capsule by mouth Daily.  Dispense: 90 capsule; Refill: 3  -     pravastatin (PRAVACHOL) 40 MG tablet; Take 1 tablet by mouth Daily.  Dispense: 90 tablet; Refill: 3  -     finasteride (PROSCAR) 5 MG tablet; Take 1 tablet by mouth Daily.  Dispense: 90 tablet; Refill: 3    4. Gastroesophageal reflux disease without esophagitis  -     omeprazole (priLOSEC) 20 MG capsule; Take 1 capsule by mouth Daily. Prn reflux  Dispense: 90 capsule; Refill: 1    5. Class 1 obesity due to excess calories with serious comorbidity and body mass index (BMI) of 32.0 to 32.9 in adult    Other orders  -     montelukast (SINGULAIR) 10 MG tablet; Take 1 tablet by mouth every night at bedtime.  Dispense: 90 tablet; Refill: 3  -     " meloxicam (MOBIC) 15 MG tablet; Use qd prn arth pain only  Dispense: 60 tablet; Refill: 3     on wt loss measures and programs  Counseled mainly on lifestyle measures infection prevention hydration etc. orders as above recheck 6 months be time for subsequent Medicare

## 2020-12-21 ENCOUNTER — LAB (OUTPATIENT)
Dept: LAB | Facility: HOSPITAL | Age: 70
End: 2020-12-21

## 2020-12-21 LAB
ALBUMIN SERPL-MCNC: 4.4 G/DL (ref 3.5–5.2)
ALBUMIN/GLOB SERPL: 1.8 G/DL
ALP SERPL-CCNC: 59 U/L (ref 39–117)
ALT SERPL W P-5'-P-CCNC: 24 U/L (ref 1–41)
ANION GAP SERPL CALCULATED.3IONS-SCNC: 10 MMOL/L (ref 5–15)
AST SERPL-CCNC: 18 U/L (ref 1–40)
BILIRUB SERPL-MCNC: 0.4 MG/DL (ref 0–1.2)
BUN SERPL-MCNC: 15 MG/DL (ref 8–23)
BUN/CREAT SERPL: 15.6 (ref 7–25)
CALCIUM SPEC-SCNC: 9.8 MG/DL (ref 8.6–10.5)
CHLORIDE SERPL-SCNC: 96 MMOL/L (ref 98–107)
CHOLEST SERPL-MCNC: 151 MG/DL (ref 0–200)
CO2 SERPL-SCNC: 26 MMOL/L (ref 22–29)
CREAT SERPL-MCNC: 0.96 MG/DL (ref 0.76–1.27)
GFR SERPL CREATININE-BSD FRML MDRD: 77 ML/MIN/1.73
GLOBULIN UR ELPH-MCNC: 2.4 GM/DL
GLUCOSE SERPL-MCNC: 102 MG/DL (ref 65–99)
HDLC SERPL-MCNC: 50 MG/DL (ref 40–60)
LDLC SERPL CALC-MCNC: 64 MG/DL (ref 0–100)
LDLC/HDLC SERPL: 1.11 {RATIO}
MAGNESIUM SERPL-MCNC: 2.2 MG/DL (ref 1.6–2.4)
POTASSIUM SERPL-SCNC: 4 MMOL/L (ref 3.5–5.2)
PROT SERPL-MCNC: 6.8 G/DL (ref 6–8.5)
PSA SERPL-MCNC: 0.33 NG/ML (ref 0–4)
SODIUM SERPL-SCNC: 132 MMOL/L (ref 136–145)
TRIGL SERPL-MCNC: 228 MG/DL (ref 0–150)
VLDLC SERPL-MCNC: 37 MG/DL (ref 5–40)

## 2020-12-21 PROCEDURE — 80053 COMPREHEN METABOLIC PANEL: CPT | Performed by: FAMILY MEDICINE

## 2020-12-21 PROCEDURE — 83735 ASSAY OF MAGNESIUM: CPT | Performed by: FAMILY MEDICINE

## 2020-12-21 PROCEDURE — 36415 COLL VENOUS BLD VENIPUNCTURE: CPT | Performed by: FAMILY MEDICINE

## 2020-12-21 PROCEDURE — 80061 LIPID PANEL: CPT | Performed by: FAMILY MEDICINE

## 2020-12-21 PROCEDURE — 84153 ASSAY OF PSA TOTAL: CPT | Performed by: FAMILY MEDICINE

## 2020-12-22 ENCOUNTER — TELEPHONE (OUTPATIENT)
Dept: FAMILY MEDICINE CLINIC | Facility: CLINIC | Age: 70
End: 2020-12-22

## 2020-12-22 NOTE — TELEPHONE ENCOUNTER
Per Dr. Vargas, Mr. Moser has been called with recent lab results & recommendations.  Continue current medications and follow-up as planned or sooner if any problems.      ----- Message from Rafa Vargas MD sent at 12/22/2020  3:29 PM CST -----  Ok, call or send card.

## 2020-12-22 NOTE — PROGRESS NOTES
Per Dr. Vargas, Mr. Moser has been called with recent lab results & recommendations.  Continue current medications and follow-up as planned or sooner if any problems.

## 2021-01-11 ENCOUNTER — TELEPHONE (OUTPATIENT)
Dept: FAMILY MEDICINE CLINIC | Facility: CLINIC | Age: 71
End: 2021-01-11

## 2021-01-11 DIAGNOSIS — R39.14 BENIGN PROSTATIC HYPERPLASIA WITH INCOMPLETE BLADDER EMPTYING: Chronic | ICD-10-CM

## 2021-01-11 DIAGNOSIS — N40.1 BENIGN PROSTATIC HYPERPLASIA WITH INCOMPLETE BLADDER EMPTYING: Chronic | ICD-10-CM

## 2021-01-11 RX ORDER — FINASTERIDE 5 MG/1
5 TABLET, FILM COATED ORAL DAILY
Qty: 90 TABLET | Refills: 3 | Status: SHIPPED | OUTPATIENT
Start: 2021-01-11 | End: 2021-12-14 | Stop reason: SDUPTHER

## 2021-01-11 RX ORDER — MONTELUKAST SODIUM 10 MG/1
10 TABLET ORAL
Qty: 90 TABLET | Refills: 3 | Status: SHIPPED | OUTPATIENT
Start: 2021-01-11 | End: 2021-12-14 | Stop reason: SDUPTHER

## 2021-01-11 NOTE — TELEPHONE ENCOUNTER
Needing refill on Singulair and finasteride. Please call [facility]. Thank you.

## 2021-02-02 ENCOUNTER — IMMUNIZATION (OUTPATIENT)
Dept: VACCINE CLINIC | Facility: HOSPITAL | Age: 71
End: 2021-02-02

## 2021-02-02 PROCEDURE — 0001A: CPT | Performed by: THORACIC SURGERY (CARDIOTHORACIC VASCULAR SURGERY)

## 2021-02-02 PROCEDURE — 91300 HC SARSCOV02 VAC 30MCG/0.3ML IM: CPT | Performed by: THORACIC SURGERY (CARDIOTHORACIC VASCULAR SURGERY)

## 2021-02-23 ENCOUNTER — IMMUNIZATION (OUTPATIENT)
Dept: VACCINE CLINIC | Facility: HOSPITAL | Age: 71
End: 2021-02-23

## 2021-02-23 PROCEDURE — 0002A: CPT | Performed by: THORACIC SURGERY (CARDIOTHORACIC VASCULAR SURGERY)

## 2021-02-23 PROCEDURE — 91300 HC SARSCOV02 VAC 30MCG/0.3ML IM: CPT | Performed by: THORACIC SURGERY (CARDIOTHORACIC VASCULAR SURGERY)

## 2021-04-19 ENCOUNTER — TELEPHONE (OUTPATIENT)
Dept: FAMILY MEDICINE CLINIC | Facility: CLINIC | Age: 71
End: 2021-04-19

## 2021-04-19 RX ORDER — FLUTICASONE PROPIONATE 50 MCG
2 SPRAY, SUSPENSION (ML) NASAL DAILY
Qty: 48 ML | Refills: 3 | Status: SHIPPED | OUTPATIENT
Start: 2021-04-19 | End: 2021-12-14 | Stop reason: SDUPTHER

## 2021-04-19 NOTE — TELEPHONE ENCOUNTER
Pt called to request Rx for annual sinus issues. Runny nose, congestion, ear ache, watery eyes.    Please call into [facility]    [phone]

## 2021-05-10 RX ORDER — FLUOCINOLONE ACETONIDE 0.1 MG/ML
SOLUTION TOPICAL
Qty: 60 ML | Refills: 1 | Status: SHIPPED | OUTPATIENT
Start: 2021-05-10 | End: 2022-04-28 | Stop reason: SDUPTHER

## 2021-06-15 ENCOUNTER — OFFICE VISIT (OUTPATIENT)
Dept: FAMILY MEDICINE CLINIC | Facility: CLINIC | Age: 71
End: 2021-06-15

## 2021-06-15 VITALS
SYSTOLIC BLOOD PRESSURE: 118 MMHG | HEIGHT: 69 IN | WEIGHT: 225 LBS | DIASTOLIC BLOOD PRESSURE: 74 MMHG | BODY MASS INDEX: 33.33 KG/M2

## 2021-06-15 DIAGNOSIS — K21.9 GASTROESOPHAGEAL REFLUX DISEASE WITHOUT ESOPHAGITIS: Chronic | ICD-10-CM

## 2021-06-15 DIAGNOSIS — I10 ESSENTIAL HYPERTENSION: Primary | Chronic | ICD-10-CM

## 2021-06-15 DIAGNOSIS — N40.1 BENIGN PROSTATIC HYPERPLASIA WITH INCOMPLETE BLADDER EMPTYING: Chronic | ICD-10-CM

## 2021-06-15 DIAGNOSIS — E66.09 CLASS 1 OBESITY DUE TO EXCESS CALORIES WITH SERIOUS COMORBIDITY AND BODY MASS INDEX (BMI) OF 33.0 TO 33.9 IN ADULT: ICD-10-CM

## 2021-06-15 DIAGNOSIS — Z00.00 MEDICARE ANNUAL WELLNESS VISIT, SUBSEQUENT: ICD-10-CM

## 2021-06-15 DIAGNOSIS — E78.00 PURE HYPERCHOLESTEROLEMIA: Chronic | ICD-10-CM

## 2021-06-15 DIAGNOSIS — R39.14 BENIGN PROSTATIC HYPERPLASIA WITH INCOMPLETE BLADDER EMPTYING: Chronic | ICD-10-CM

## 2021-06-15 PROCEDURE — 99214 OFFICE O/P EST MOD 30 MIN: CPT | Performed by: FAMILY MEDICINE

## 2021-06-15 PROCEDURE — G0439 PPPS, SUBSEQ VISIT: HCPCS | Performed by: FAMILY MEDICINE

## 2021-06-15 RX ORDER — OMEPRAZOLE 20 MG/1
20 CAPSULE, DELAYED RELEASE ORAL DAILY
Qty: 90 CAPSULE | Refills: 1 | Status: SHIPPED | OUTPATIENT
Start: 2021-06-15 | End: 2021-12-14 | Stop reason: SDUPTHER

## 2021-06-15 RX ORDER — MELOXICAM 15 MG/1
TABLET ORAL
Qty: 60 TABLET | Refills: 3 | Status: SHIPPED | OUTPATIENT
Start: 2021-06-15 | End: 2022-06-14 | Stop reason: SDUPTHER

## 2021-06-15 NOTE — PROGRESS NOTES
Subjective   Kamlesh Moser is a 70 y.o. male.  Reevaluation hypertension hyperlipidemia obesity BPH.  Interim enforces gained a little weight BMI is jumped up to 33.  Blood work previously was okay however.  Feels well working in high feels now.  Counseled staying hydrated.  States not quite sure when his last colon screening was we have it down for 2022.  Feels well otherwise.    History of Present Illness   HPI    The following portions of the patient's history were reviewed and updated as appropriate: allergies, current medications, past family history, past medical history, past social history, past surgical history and problem list.    Review of Systems  Review of Systems   Constitutional: Negative for activity change, appetite change, fatigue and unexpected weight change.   HENT: Negative for trouble swallowing and voice change.    Eyes: Negative for redness and visual disturbance.   Respiratory: Negative for cough and wheezing.    Cardiovascular: Negative for chest pain and palpitations.   Gastrointestinal: Negative for abdominal pain, constipation, diarrhea, nausea and vomiting.   Genitourinary: Negative for urgency.   Musculoskeletal: Positive for arthralgias. Negative for joint swelling.   Neurological: Negative for syncope and headaches.   Hematological: Negative for adenopathy.   Psychiatric/Behavioral: Negative for sleep disturbance.       Objective   Physical Exam  Physical Exam  Constitutional:       Appearance: He is well-developed.   HENT:      Head: Normocephalic.   Eyes:      Pupils: Pupils are equal, round, and reactive to light.   Neck:      Thyroid: No thyromegaly.   Cardiovascular:      Rate and Rhythm: Normal rate and regular rhythm.      Heart sounds: Normal heart sounds. No murmur heard.   No friction rub. No gallop.    Pulmonary:      Breath sounds: Normal breath sounds.   Abdominal:      General: There is no distension.      Palpations: Abdomen is soft. There is no mass.       "Tenderness: There is no abdominal tenderness.   Musculoskeletal:         General: Normal range of motion.      Cervical back: Normal range of motion.      Comments: 2+ pulses 2+ DTRs get up and go 3 to 5 seconds   Skin:     General: Skin is warm and dry.      Comments: Light tan no new lesions counseled on same   Neurological:      Mental Status: He is alert and oriented to person, place, and time.      Deep Tendon Reflexes: Reflexes are normal and symmetric.   Psychiatric:         Attention and Perception: Attention normal.         Mood and Affect: Mood normal.         Speech: Speech normal.         Behavior: Behavior normal.         Thought Content: Thought content normal.         Cognition and Memory: Cognition normal.           Visit Vitals  /74   Ht 175.3 cm (69\")   Wt 102 kg (225 lb)   BMI 33.23 kg/m²     Body mass index is 33.23 kg/m².    /74   Ht 175.3 cm (69\")   Wt 102 kg (225 lb)   BMI 33.23 kg/m²     Assessment/Plan   Diagnoses and all orders for this visit:    1. Essential hypertension (Primary)  -     Magnesium; Future  -     Comprehensive Metabolic Panel; Future    2. Pure hypercholesterolemia  -     Lipid Panel With LDL / HDL Ratio; Future    3. Class 1 obesity due to excess calories with serious comorbidity and body mass index (BMI) of 33.0 to 33.9 in adult    4. Medicare annual wellness visit, subsequent    5. Benign prostatic hyperplasia with incomplete bladder emptying  -     PSA DIAGNOSTIC; Future    6. Gastroesophageal reflux disease without esophagitis  -     omeprazole (priLOSEC) 20 MG capsule; Take 1 capsule by mouth Daily. Prn reflux  Dispense: 90 capsule; Refill: 1    Other orders  -     meloxicam (MOBIC) 15 MG tablet; Use qd prn arth pain only  Dispense: 60 tablet; Refill: 3     on wt loss measures and programs  Counseled mainly on lifestyle measures diet exercise hydration flu vaccine in the fall orders as above recheck 6 months lab prior  "

## 2021-06-15 NOTE — PROGRESS NOTES
The ABCs of the Annual Wellness Visit  Subsequent Medicare Wellness Visit    Chief Complaint   Patient presents with   • Hypertension     6 month reval       Subjective   History of Present Illness:  Kamlesh Moser is a 70 y.o. male who presents for a Subsequent Medicare Wellness Visit.    HEALTH RISK ASSESSMENT    Recent Hospitalizations:  No hospitalization(s) within the last year.    Current Medical Providers:  Patient Care Team:  Blanco Drummond MD as PCP - General (Family Medicine)  Mack Yeager MD as Surgeon (Otolaryngology)    Smoking Status:  Social History     Tobacco Use   Smoking Status Former Smoker   Smokeless Tobacco Never Used       Alcohol Consumption:  Social History     Substance and Sexual Activity   Alcohol Use Yes    Comment: social       Depression Screen:   PHQ-2/PHQ-9 Depression Screening 6/15/2021   Little interest or pleasure in doing things 0   Feeling down, depressed, or hopeless 0   Trouble falling or staying asleep, or sleeping too much -   Feeling tired or having little energy -   Poor appetite or overeating -   Feeling bad about yourself - or that you are a failure or have let yourself or your family down -   Trouble concentrating on things, such as reading the newspaper or watching television -   Moving or speaking so slowly that other people could have noticed. Or the opposite - being so fidgety or restless that you have been moving around a lot more than usual -   Thoughts that you would be better off dead, or of hurting yourself in some way -   Total Score 0   If you checked off any problems, how difficult have these problems made it for you to do your work, take care of things at home, or get along with other people? -       Fall Risk Screen:  STEADI Fall Risk Assessment was completed, and patient is at LOW risk for falls.Assessment completed on:6/15/2021    Health Habits and Functional and Cognitive Screening:  Functional & Cognitive Status 12/18/2019   Do you  have difficulty preparing food and eating? No   Do you have difficulty bathing yourself, getting dressed or grooming yourself? No   Do you have difficulty using the toilet? No   Do you have difficulty moving around from place to place? No   Do you have trouble with steps or getting out of a bed or a chair? No   Current Diet Well Balanced Diet   Dental Exam Up to date   Eye Exam Up to date   Exercise (times per week) 5 times per week   Current Exercise Activities Include Walking   Do you need help using the phone?  No   Are you deaf or do you have serious difficulty hearing?  Yes   Do you need help with transportation? No   Do you need help shopping? No   Do you need help preparing meals?  No   Do you need help with housework?  No   Do you need help with laundry? No   Do you need help taking your medications? No   Do you need help managing money? No   Do you ever drive or ride in a car without wearing a seat belt? No   Have you felt unusual stress, anger or loneliness in the last month? No   Who do you live with? Spouse   If you need help, do you have trouble finding someone available to you? No   Have you been bothered in the last four weeks by sexual problems? No   Do you have difficulty concentrating, remembering or making decisions? No         Does the patient have evidence of cognitive impairment? No    Asprin use counseling:Taking ASA appropriately as indicated    Age-appropriate Screening Schedule:  Refer to the list below for future screening recommendations based on patient's age, sex and/or medical conditions. Orders for these recommended tests are listed in the plan section. The patient has been provided with a written plan.    Health Maintenance   Topic Date Due   • INFLUENZA VACCINE  08/01/2021   • LIPID PANEL  12/21/2021   • TDAP/TD VACCINES (2 - Td or Tdap) 06/19/2024   • DIABETIC FOOT EXAM  Discontinued   • HEMOGLOBIN A1C  Discontinued   • DIABETIC EYE EXAM  Discontinued   • URINE MICROALBUMIN   Discontinued   • ZOSTER VACCINE  Discontinued          The following portions of the patient's history were reviewed and updated as appropriate: allergies, current medications, past family history, past medical history, past social history, past surgical history and problem list.    Outpatient Medications Prior to Visit   Medication Sig Dispense Refill   • AZOPT 1 % ophthalmic suspension Administer 1 drop to both eyes 3 (Three) Times a Day. 10 mL 5   • finasteride (PROSCAR) 5 MG tablet Take 1 tablet by mouth Daily. 90 tablet 3   • fluocinolone (SYNALAR) 0.01 % external solution APPLY TWO TIMES A DAY..3 DROPS EACH EAR X 7 DAYS 60 mL 1   • fluticasone (Flonase) 50 MCG/ACT nasal spray 2 sprays into the nostril(s) as directed by provider Daily. 48 mL 3   • montelukast (SINGULAIR) 10 MG tablet Take 1 tablet by mouth every night at bedtime. 90 tablet 3   • pravastatin (PRAVACHOL) 40 MG tablet Take 1 tablet by mouth Daily. 90 tablet 3   • tamsulosin (FLOMAX) 0.4 MG capsule 24 hr capsule Take 1 capsule by mouth Daily. 90 capsule 3   • triamterene-hydrochlorothiazide (MAXZIDE-25) 37.5-25 MG per tablet Take 1 tablet by mouth Daily. for blood pressure 90 tablet 3   • meloxicam (MOBIC) 15 MG tablet Use qd prn arth pain only 60 tablet 3   • omeprazole (priLOSEC) 20 MG capsule Take 1 capsule by mouth Daily. Prn reflux 90 capsule 1     No facility-administered medications prior to visit.       Patient Active Problem List   Diagnosis   • Arthritis   • GERD (gastroesophageal reflux disease)   • Hyperlipidemia   • Hypertension   • Primary osteoarthritis of both knees   • Primary open angle glaucoma of left eye, mild stage   • Cataract   • Osteoarthritis of multiple joints   • Fatty liver   • Gallstones   • Benign prostatic hyperplasia with incomplete bladder emptying   • Class 1 obesity due to excess calories with serious comorbidity and body mass index (BMI) of 33.0 to 33.9 in adult       Advanced Care Planning:  ACP discussion was  "held with the patient during this visit. Patient has an advance directive (not in EMR), copy requested.    Review of Systems    Compared to one year ago, the patient feels his physical health is the same.  Compared to one year ago, the patient feels his mental health is the same.    Reviewed chart for potential of high risk medication in the elderly: yes  Reviewed chart for potential of harmful drug interactions in the elderly:yes    Objective         Vitals:    06/15/21 0804   BP: 118/74   Weight: 102 kg (225 lb)   Height: 175.3 cm (69\")   PainSc: 0-No pain       Body mass index is 33.23 kg/m².  Discussed the patient's BMI with him. The BMI is above average; BMI management plan is completed.    Physical Exam          Assessment/Plan   Medicare Risks and Personalized Health Plan  CMS Preventative Services Quick Reference  Advance Directive Discussion  Immunizations Discussed/Encouraged (specific immunizations; Influenza )  Inactivity/Sedentary  Obesity/Overweight     The above risks/problems have been discussed with the patient.  Pertinent information has been shared with the patient in the After Visit Summary.  Follow up plans and orders are seen below in the Assessment/Plan Section.    Diagnoses and all orders for this visit:    1. Essential hypertension (Primary)  -     Magnesium; Future  -     Comprehensive Metabolic Panel; Future    2. Pure hypercholesterolemia  -     Lipid Panel With LDL / HDL Ratio; Future    3. Class 1 obesity due to excess calories with serious comorbidity and body mass index (BMI) of 33.0 to 33.9 in adult    4. Medicare annual wellness visit, subsequent    5. Benign prostatic hyperplasia with incomplete bladder emptying  -     PSA DIAGNOSTIC; Future    6. Gastroesophageal reflux disease without esophagitis  -     omeprazole (priLOSEC) 20 MG capsule; Take 1 capsule by mouth Daily. Prn reflux  Dispense: 90 capsule; Refill: 1    Other orders  -     meloxicam (MOBIC) 15 MG tablet; Use qd prn " arth pain only  Dispense: 60 tablet; Refill: 3      Follow Up:  No follow-ups on file.     An After Visit Summary and PPPS were given to the patient.

## 2021-07-29 ENCOUNTER — OFFICE VISIT (OUTPATIENT)
Dept: OTOLARYNGOLOGY | Facility: CLINIC | Age: 71
End: 2021-07-29

## 2021-07-29 VITALS — WEIGHT: 224 LBS | OXYGEN SATURATION: 96 % | HEIGHT: 69 IN | BODY MASS INDEX: 33.18 KG/M2

## 2021-07-29 DIAGNOSIS — H60.63 CHRONIC NON-INFECTIVE OTITIS EXTERNA OF BOTH EARS, UNSPECIFIED TYPE: Primary | ICD-10-CM

## 2021-07-29 PROCEDURE — 99213 OFFICE O/P EST LOW 20 MIN: CPT | Performed by: OTOLARYNGOLOGY

## 2021-08-23 DIAGNOSIS — M25.512 ACUTE PAIN OF LEFT SHOULDER: Primary | ICD-10-CM

## 2021-08-24 ENCOUNTER — OFFICE VISIT (OUTPATIENT)
Dept: ORTHOPEDIC SURGERY | Facility: CLINIC | Age: 71
End: 2021-08-24

## 2021-08-24 VITALS — BODY MASS INDEX: 32.58 KG/M2 | HEART RATE: 62 BPM | WEIGHT: 220 LBS | HEIGHT: 69 IN | OXYGEN SATURATION: 97 %

## 2021-08-24 DIAGNOSIS — K21.9 GASTROESOPHAGEAL REFLUX DISEASE WITHOUT ESOPHAGITIS: ICD-10-CM

## 2021-08-24 DIAGNOSIS — M75.102 ROTATOR CUFF SYNDROME, LEFT: Primary | ICD-10-CM

## 2021-08-24 DIAGNOSIS — M25.512 ACUTE PAIN OF LEFT SHOULDER: ICD-10-CM

## 2021-08-24 PROCEDURE — 99204 OFFICE O/P NEW MOD 45 MIN: CPT | Performed by: ORTHOPAEDIC SURGERY

## 2021-08-24 RX ORDER — DIAZEPAM 10 MG/1
TABLET ORAL
Qty: 2 TABLET | Refills: 0 | Status: SHIPPED | OUTPATIENT
Start: 2021-08-24 | End: 2021-12-09

## 2021-08-24 NOTE — PROGRESS NOTES
Kamlesh Moser is a 71 y.o. male   Primary provider:  Blanco Drummond MD       Chief Complaint   Patient presents with   • Left Shoulder - Pain       HISTORY OF PRESENT ILLNESS:    Patient states pain started about 5 weeks ago. xrays done today.   No specific injury but woke up one morning with severe pain in left shoulder  Had significant bruising in left shoulder at the time  Cannot lift with left arm  Cannot raise arm above head  Pain at night.  Has not been getting better  Tried HEP without improvement.    Pain  This is a new problem. The current episode started 1 to 4 weeks ago. The problem occurs intermittently. The problem has been unchanged. Associated symptoms include coughing and joint swelling. Associated symptoms comments: Popping, bruising. . Exacerbated by: driving.        CONCURRENT MEDICAL HISTORY:    Past Medical History:   Diagnosis Date   • Acute maxillary sinusitis    • Allergic rhinitis      Moderately severe      • Arthritis    • Arthritis    • Atopic conjunctivitis    • Benign prostatic hyperplasia    • Bilateral hearing loss    • Cellulitis     CHESTWAL   • Chronic otitis externa    • Common cold    • Diabetes (CMS/HCC)    • Diverticular disease of colon    • Dyspnea    • Essential hypertension    • Fracture of pelvis (CMS/HCC)    • Fracture of wrist    • Fracture, radius    • Fracture, ulna    • GERD (gastroesophageal reflux disease)    • GERD (gastroesophageal reflux disease)    • Hiatal hernia    • Hiatal hernia    • Hyperlipidemia    • Hyperlipidemia    • Hypertension    • Hypertension    • Hypertensive disorder    • Immunization due    • Inflamed seborrheic keratosis    • Influenza vaccination given     Influenza vaccination   12/10/2013   • Low back pain    • Need for immunization against influenza    • Need for prophylactic vaccination against Streptococcus pneumoniae (pneumococcus)    • Need for prophylactic vaccination with combined diphtheria-tetanus-pertussis (DTP) vaccine      Need for prophylactic vaccination and inoculation against Diptheria-tetanus-pertussis, combined [DTP] [DTaP]     • Nuclear senile cataract     moderate   • Onychomycosis    • Osteoarthritis of multiple joints    • Primary open angle glaucoma     Right eye IOP too high s/p LT      • Primary osteoarthritis of both knees    • Rhinitis    • Screening for malignant neoplasm of colon    • Screening for malignant neoplasm of prostate    • Shoulder pain    • Temporomandibular joint disorder    • Vitamin D deficiency        Allergies   Allergen Reactions   • Penicillins Unknown - Low Severity         Current Outpatient Medications:   •  AZOPT 1 % ophthalmic suspension, Administer 1 drop to both eyes 3 (Three) Times a Day., Disp: 10 mL, Rfl: 5  •  finasteride (PROSCAR) 5 MG tablet, Take 1 tablet by mouth Daily., Disp: 90 tablet, Rfl: 3  •  fluocinolone (SYNALAR) 0.01 % external solution, APPLY TWO TIMES A DAY..3 DROPS EACH EAR X 7 DAYS, Disp: 60 mL, Rfl: 1  •  fluticasone (Flonase) 50 MCG/ACT nasal spray, 2 sprays into the nostril(s) as directed by provider Daily., Disp: 48 mL, Rfl: 3  •  meloxicam (MOBIC) 15 MG tablet, Use qd prn arth pain only, Disp: 60 tablet, Rfl: 3  •  montelukast (SINGULAIR) 10 MG tablet, Take 1 tablet by mouth every night at bedtime., Disp: 90 tablet, Rfl: 3  •  omeprazole (priLOSEC) 20 MG capsule, Take 1 capsule by mouth Daily. Prn reflux, Disp: 90 capsule, Rfl: 1  •  pravastatin (PRAVACHOL) 40 MG tablet, Take 1 tablet by mouth Daily., Disp: 90 tablet, Rfl: 3  •  tamsulosin (FLOMAX) 0.4 MG capsule 24 hr capsule, Take 1 capsule by mouth Daily., Disp: 90 capsule, Rfl: 3  •  triamterene-hydrochlorothiazide (MAXZIDE-25) 37.5-25 MG per tablet, Take 1 tablet by mouth Daily. for blood pressure, Disp: 90 tablet, Rfl: 3  •  diazePAM (Valium) 10 MG tablet, Take 1 pill 30 min prior to MRI and another at time of MRI if necessary, Disp: 2 tablet, Rfl: 0    Past Surgical History:   Procedure Laterality Date   •  BACK SURGERY     • BACK SURGERY  1982    Low back disk surgery    • CARDIAC CATHETERIZATION  02/08/2011    Symptoms of angina that have resolved with normalization of blood pressure. Very mild nonobstructive coronary artery disease. Normal systolic function.   • CRYOTHERAPY  06/07/2013    CRYOTHERAPY OF ACNE   • ENDOSCOPY AND COLONOSCOPY  01/16/2012    x2, Diverticulosis in sigmoid colon. Hemorrhoids in anus.   • ENDOSCOPY W/ PEG TUBE PLACEMENT  04/12/2002    EGD w/ tube: Dysphagia, Esophagitis, Hiatal hernia, Duodenitis.   • INCISION AND DRAINAGE ABSCESS  04/10/2013    I&D, Simple    • INJECTION OF MEDICATION  05/25/2016    Kenalog x3   • KNEE SURGERY     • KNEE SURGERY  05/12/2003    x3, Arthroscopic medial meniscectomy, right knee.   • OTHER SURGICAL HISTORY  09/11/1996    Forearm or wrist surgery: x2, Removal of K-wires, left radius    • OTHER SURGICAL HISTORY  04/25/1986    Hand/finger surgery: Repair radial collateral ligament, I&D, right fifth finger. Repair laceration.   • PROCTOSIGMOIDOSCOPY  04/23/1982    Proctosigmoidoscopy, Rigid: Normal   • REFRACTIVE SURGERY  12/29/2014    LASER SURGERY OF EYE: x2   • WRIST SURGERY         Family History   Problem Relation Age of Onset   • Diabetes Mother    • Heart disease Mother 60        MI   • Arthritis Mother    • Hypertension Mother    • Hyperlipidemia Mother    • Kidney disease Mother    • Thyroid disease Mother    • Diabetes Father    • Heart disease Father    • Arthritis Father    • Hypertension Father    • Hyperlipidemia Father    • Stroke Father 80   • Diabetes Other    • Heart disease Other    • Hypertension Other    • Heart disease Brother 40        cabg   • Cancer Neg Hx         Social History     Socioeconomic History   • Marital status:      Spouse name: Not on file   • Number of children: Not on file   • Years of education: Not on file   • Highest education level: Not on file   Tobacco Use   • Smoking status: Former Smoker   • Smokeless tobacco:  "Never Used   Substance and Sexual Activity   • Alcohol use: Yes     Comment: social   • Drug use: No   • Sexual activity: Defer        Review of Systems   HENT: Positive for hearing loss.         Ringing in ears,    Eyes: Positive for pain.   Respiratory: Positive for cough.    Genitourinary: Positive for difficulty urinating.   Musculoskeletal: Positive for joint swelling.        Joint pain.    All other systems reviewed and are negative.      PHYSICAL EXAMINATION:       Pulse 62   Ht 175.3 cm (69\")   Wt 99.8 kg (220 lb)   SpO2 97%   BMI 32.49 kg/m²     Physical Exam  Constitutional:       General: He is not in acute distress.     Appearance: Normal appearance.   Pulmonary:      Effort: Pulmonary effort is normal. No respiratory distress.   Neurological:      Mental Status: He is alert and oriented to person, place, and time.         GAIT:     [x]  Normal  []  Antalgic    Assistive device: [x]  None  []  Walker     []  Crutches  []  Cane     []  Wheelchair  []  Stretcher    Left Shoulder Exam     Tenderness   The patient is experiencing tenderness in the acromioclavicular joint and acromion.    Range of Motion   Active abduction: 80   Passive abduction: 150   Forward flexion: 100     Muscle Strength   Abduction: 3/5   Supraspinatus: 3/5     Tests   Boyle test: positive  Cross arm: positive  Impingement: positive    Other   Erythema: absent  Sensation: normal  Pulse: present     Comments:  Positive empty can test.              XR Shoulder 2+ View Left    Result Date: 8/24/2021  Narrative: Ordering Provider:  Elias Stokes MD Ordering Diagnosis/Indication:  Acute pain of left shoulder Procedure:  XR SHOULDER 2+ VW LEFT Exam Date:  8/24/21 COMPARISON:  Not applicable, no relevant images available.     Impression:  3 views of the left shoulder show acceptable position and alignment with no evidence of acute bony abnormality.  No fracture or dislocation is noted.  Mild to moderate arthritic change noted " in the AC joint.  No acute findings.  The internal rotation view shows mild proximal subluxation of the humeral head.  MRI would be warranted to assess for rotator cuff pathology. Elias Stokes MD 8/24/21           ASSESSMENT:    Diagnoses and all orders for this visit:    Rotator cuff syndrome, left  -     diazePAM (Valium) 10 MG tablet; Take 1 pill 30 min prior to MRI and another at time of MRI if necessary  -     MRI Shoulder Left Without Contrast; Future    Acute pain of left shoulder  -     diazePAM (Valium) 10 MG tablet; Take 1 pill 30 min prior to MRI and another at time of MRI if necessary  -     MRI Shoulder Left Without Contrast; Future    Gastroesophageal reflux disease without esophagitis          PLAN    He began having pain suddenly approximately 5 weeks ago.  He has had significant weakness and motion deficit in his left arm since then.  His pain has not gotten any better.  He had significant bruising in the upper part of his arm and in his brachium as well.  On exam he seems to have rotator cuff tear.  The plan is to proceed with MRI of his left shoulder to assess for the integrity of the rotator cuff and to direct further treatment options including the possibility of surgical intervention.  Since he has significant weakness on exam and has significantly better passive range of motion as compared to active range of motion of the left shoulder, I do not feel that proceeding with physical therapy at this time is warranted and would proceed directly to MRI.  Patient states that he has previously had a difficult time with MRI due to the tightness of the tube and his anxiety.  We discussed using an anxiolytic to help with the MRI process.  That medicine was sent to his pharmacy.    Return for recheck for MRI results.    Elias Stokes MD

## 2021-09-01 ENCOUNTER — HOSPITAL ENCOUNTER (OUTPATIENT)
Dept: MRI IMAGING | Facility: HOSPITAL | Age: 71
Discharge: HOME | End: 2021-09-01
Admitting: ORTHOPAEDIC SURGERY

## 2021-09-01 DIAGNOSIS — M25.512 ACUTE PAIN OF LEFT SHOULDER: ICD-10-CM

## 2021-09-01 DIAGNOSIS — M75.102 ROTATOR CUFF SYNDROME, LEFT: ICD-10-CM

## 2021-09-01 PROCEDURE — 73221 MRI JOINT UPR EXTREM W/O DYE: CPT

## 2021-09-09 ENCOUNTER — OFFICE VISIT (OUTPATIENT)
Dept: ORTHOPEDIC SURGERY | Facility: CLINIC | Age: 71
End: 2021-09-09

## 2021-09-09 VITALS — WEIGHT: 220 LBS | BODY MASS INDEX: 32.58 KG/M2 | HEIGHT: 69 IN

## 2021-09-09 DIAGNOSIS — M12.812 ROTATOR CUFF ARTHROPATHY, LEFT: Primary | ICD-10-CM

## 2021-09-09 DIAGNOSIS — M25.512 ACUTE PAIN OF LEFT SHOULDER: ICD-10-CM

## 2021-09-09 DIAGNOSIS — I10 ESSENTIAL HYPERTENSION: ICD-10-CM

## 2021-09-09 DIAGNOSIS — M75.102 ROTATOR CUFF SYNDROME, LEFT: ICD-10-CM

## 2021-09-09 PROCEDURE — 99213 OFFICE O/P EST LOW 20 MIN: CPT | Performed by: ORTHOPAEDIC SURGERY

## 2021-09-09 PROCEDURE — 20610 DRAIN/INJ JOINT/BURSA W/O US: CPT | Performed by: ORTHOPAEDIC SURGERY

## 2021-09-09 RX ADMIN — LIDOCAINE HYDROCHLORIDE 2 ML: 10 INJECTION, SOLUTION INFILTRATION; PERINEURAL at 10:39

## 2021-09-09 RX ADMIN — TRIAMCINOLONE ACETONIDE 40 MG: 40 INJECTION, SUSPENSION INTRA-ARTICULAR; INTRAMUSCULAR at 10:39

## 2021-09-09 NOTE — PROGRESS NOTES
"Kamlesh Moser is a 71 y.o. male returns for     Chief Complaint   Patient presents with   • Left Shoulder - Follow-up       HISTORY OF PRESENT ILLNESS:  Patient in for MRI results of the left shoulder  Pain is with movement, but it is getting harder for him to do things.  He is having pain at night.  Pain is worse with activity     CONCURRENT MEDICAL HISTORY:    The following portions of the patient's history were reviewed and updated as appropriate: allergies, current medications, past family history, past medical history, past social history, past surgical history and problem list.     ROS  No fevers or chills.  No chest pain or shortness of air.  No GI or  disturbances.    PHYSICAL EXAMINATION:       Ht 175.3 cm (69\")   Wt 99.8 kg (220 lb)   BMI 32.49 kg/m²     Physical Exam  Constitutional:       General: He is not in acute distress.     Appearance: Normal appearance.   Pulmonary:      Effort: Pulmonary effort is normal. No respiratory distress.   Neurological:      Mental Status: He is alert and oriented to person, place, and time.         GAIT:     []  Normal  [x]  Antalgic    Assistive device: [x]  None  []  Walker     []  Crutches  []  Cane     []  Wheelchair  []  Stretcher    Left Shoulder Exam     Tenderness   The patient is experiencing tenderness in the acromioclavicular joint and acromion.    Range of Motion   Active abduction: 80   Passive abduction: 150   Forward flexion: 100     Muscle Strength   Abduction: 3/5   Supraspinatus: 3/5     Tests   Boyle test: positive  Cross arm: positive  Impingement: positive    Other   Erythema: absent  Sensation: normal  Pulse: present     Comments:  Positive empty can test.              XR Shoulder 2+ View Left    Result Date: 8/24/2021  Narrative: Ordering Provider:  Elias Stokes MD Ordering Diagnosis/Indication:  Acute pain of left shoulder Procedure:  XR SHOULDER 2+ VW LEFT Exam Date:  8/24/21 COMPARISON:  Not applicable, no relevant images " available.     Impression:  3 views of the left shoulder show acceptable position and alignment with no evidence of acute bony abnormality.  No fracture or dislocation is noted.  Mild to moderate arthritic change noted in the AC joint.  No acute findings.  The internal rotation view shows mild proximal subluxation of the humeral head.  MRI would be warranted to assess for rotator cuff pathology. Elias Stokes MD 8/24/21     MRI Shoulder Left Without Contrast    Result Date: 9/2/2021  Narrative: EXAM: MR SHOULDER WITHOUT IV CONTRAST COMPARISONS: Shoulder radiograph 8/24/2021 INDICATION: pain, M25.512 Pain in left shoulder M75.102 Unspecified rotator cuff tear or rupture of left shoulder, not specified as traumatic TECHNIQUE: Multiplanar, multisequence MR images were obtained of the left shoulder without the use of intravenous contrast. FINDINGS: Acromioclavicular Joint and Coracoclavicular Interval: Mild degenerative changes of the acromioclavicular joint. There is narrowing of the acromiohumeral interval to 5 mm. Acromioclavicular and coracoclavicular ligaments are intact. No subacromial enthesophyte. Fluid is seen within the subdeltoid bursa. Rotator Cuff: Full-thickness tear of the supraspinatus with tendinous retraction of at least 4.1 cm to the level of the acromioclavicular joint. There is fatty atrophy of the supraspinatus muscle belly. Small insertional tear of the infraspinatus measuring 3 mm in AP dimension. There is mild fatty atrophy of the infraspinatus muscle belly. Teres minor is intact with mild fatty atrophy. Full-thickness partial tear of the subscapularis middle and superior fibers with tendinous retraction. A few of the inferior fibers remain intact. There is buckling of the retracted tendon to the level of at least the glenohumeral joint. Biceps Tendon: Biceps tendon is not visualized within the bicipital groove and likely torn with distal retraction. Glenoid: Essentially circumferential  tear of the labrum, most significant at the anterior superior quadrant. No acute osseous glenoid abnormality. Glenohumeral ligaments are intact. Osseous Structures and Cartilage: Mild proximal humeral marrow reconversion. Loss of the glenohumeral articular cartilage. There are osteophytes of the inferior humeral head. There are subcortical sclerotic and cystic degenerative changes of the anterior and posterior humeral head. Miscellaneous: There is a small joint effusion. No mass. Visualized portions of the lung are unremarkable.     Impression: Circumferential labral tearing, most significant in the anterior superior quadrant. Full-thickness tear of the supraspinatus with tendinous retraction and muscular atrophy. Full-thickness, partial tear of the subscapularis superior fibers (upper 50%) with tendinous retraction. Infraspinatus insertional tears with infraspinatus muscular atrophy. Teres minor remains intact with mild fatty atrophy. Tear of the biceps tendon with likely distal retraction. Left glenohumeral osteoarthritis and chondromalacia with high riding humeral head consistent with chronic rotator cuff insufficiency. Joint effusion and subdeltoid bursitis. Acromioclavicular osteoarthritis. Electronically signed by:  Kentrell Marquez MD  9/2/2021 2:34 PM CDT Workstation: [id number]            ASSESSMENT:    Diagnoses and all orders for this visit:    Rotator cuff arthropathy, left  -     Large Joint Arthrocentesis: L subacromial bursa  -     Ambulatory Referral to Physical Therapy Evaluate and treat    Acute pain of left shoulder  -     Ambulatory Referral to Physical Therapy Evaluate and treat    Rotator cuff syndrome, left  -     Large Joint Arthrocentesis: L subacromial bursa  -     Ambulatory Referral to Physical Therapy Evaluate and treat    Essential hypertension          PLAN    MRI and results reviewed with patient    PT:   Has rotator cuff arthropathy   Injected today   Try to improved function  in available range of motion   Will eventually need reverse TSA.   2-4 visits should be all he needs for right now.    Steroid injection today  Eventual reverse TSA    Large Joint Arthrocentesis: L subacromial bursa  Date/Time: 9/9/2021 10:39 AM  Consent given by: patient  Site marked: site marked  Timeout: Immediately prior to procedure a time out was called to verify the correct patient, procedure, equipment, support staff and site/side marked as required   Supporting Documentation  Indications: pain   Procedure Details  Location: shoulder - L subacromial bursa  Preparation: Patient was prepped and draped in the usual sterile fashion  Needle size: 22 G  Approach: posterior  Medications administered: 40 mg triamcinolone acetonide 40 MG/ML; 2 mL lidocaine 1 %  Patient tolerance: patient tolerated the procedure well with no immediate complications            Return in about 6 weeks (around 10/21/2021) for recheck.    Elias Stokes MD

## 2021-09-12 RX ORDER — TRIAMCINOLONE ACETONIDE 40 MG/ML
40 INJECTION, SUSPENSION INTRA-ARTICULAR; INTRAMUSCULAR
Status: COMPLETED | OUTPATIENT
Start: 2021-09-09 | End: 2021-09-09

## 2021-09-12 RX ORDER — LIDOCAINE HYDROCHLORIDE 10 MG/ML
2 INJECTION, SOLUTION INFILTRATION; PERINEURAL
Status: COMPLETED | OUTPATIENT
Start: 2021-09-09 | End: 2021-09-09

## 2021-09-15 ENCOUNTER — HOSPITAL ENCOUNTER (OUTPATIENT)
Dept: PHYSICAL THERAPY | Facility: HOSPITAL | Age: 71
Setting detail: THERAPIES SERIES
Discharge: HOME | End: 2021-09-15

## 2021-09-15 DIAGNOSIS — M25.512 ACUTE PAIN OF LEFT SHOULDER: Primary | ICD-10-CM

## 2021-09-15 PROCEDURE — 97162 PT EVAL MOD COMPLEX 30 MIN: CPT | Performed by: PHYSICAL THERAPIST

## 2021-09-15 PROCEDURE — 97110 THERAPEUTIC EXERCISES: CPT | Performed by: PHYSICAL THERAPIST

## 2021-09-15 NOTE — THERAPY EVALUATION
Outpatient Physical Therapy Ortho Initial Evaluation  [facility]     Patient Name: Kamlesh Moser  : 1950  MRN: [id number]  Today's Date: 2021      Visit Date: 09/15/2021  Attendance:  (2-4 per MD)  Subjective % Improvement: n/a  Recert Date: 10/5/21  MD appointment: TBD    Therapy Diagnosis: Left shoulder pain, RTC tear    Patient Active Problem List   Diagnosis   • Arthritis   • GERD (gastroesophageal reflux disease)   • Hyperlipidemia   • Hypertension   • Primary osteoarthritis of both knees   • Primary open angle glaucoma of left eye, mild stage   • Cataract   • Osteoarthritis of multiple joints   • Fatty liver   • Gallstones   • Benign prostatic hyperplasia with incomplete bladder emptying   • Class 1 obesity due to excess calories with serious comorbidity and body mass index (BMI) of 33.0 to 33.9 in adult   • Acute pain of left shoulder        Past Medical History:   Diagnosis Date   • Acute maxillary sinusitis    • Allergic rhinitis      Moderately severe      • Arthritis    • Arthritis    • Atopic conjunctivitis    • Benign prostatic hyperplasia    • Bilateral hearing loss    • Cellulitis     CHESTWAL   • Chronic otitis externa    • Common cold    • Diabetes (CMS/HCC)    • Diverticular disease of colon    • Dyspnea    • Essential hypertension    • Fracture of pelvis (CMS/HCC)    • Fracture of wrist    • Fracture, radius    • Fracture, ulna    • GERD (gastroesophageal reflux disease)    • GERD (gastroesophageal reflux disease)    • Hiatal hernia    • Hiatal hernia    • Hyperlipidemia    • Hyperlipidemia    • Hypertension    • Hypertension    • Hypertensive disorder    • Immunization due    • Inflamed seborrheic keratosis    • Influenza vaccination given     Influenza vaccination   12/10/2013   • Low back pain    • Need for immunization against influenza    • Need for prophylactic vaccination against Streptococcus pneumoniae (pneumococcus)    • Need for prophylactic vaccination  with combined diphtheria-tetanus-pertussis (DTP) vaccine     Need for prophylactic vaccination and inoculation against Diptheria-tetanus-pertussis, combined [DTP] [DTaP]     • Nuclear senile cataract     moderate   • Onychomycosis    • Osteoarthritis of multiple joints    • Primary open angle glaucoma     Right eye IOP too high s/p LT      • Primary osteoarthritis of both knees    • Rhinitis    • Screening for malignant neoplasm of colon    • Screening for malignant neoplasm of prostate    • Shoulder pain    • Temporomandibular joint disorder    • Vitamin D deficiency         Past Surgical History:   Procedure Laterality Date   • BACK SURGERY     • BACK SURGERY  1982    Low back disk surgery    • CARDIAC CATHETERIZATION  02/08/2011    Symptoms of angina that have resolved with normalization of blood pressure. Very mild nonobstructive coronary artery disease. Normal systolic function.   • CRYOTHERAPY  06/07/2013    CRYOTHERAPY OF ACNE   • ENDOSCOPY AND COLONOSCOPY  01/16/2012    x2, Diverticulosis in sigmoid colon. Hemorrhoids in anus.   • ENDOSCOPY W/ PEG TUBE PLACEMENT  04/12/2002    EGD w/ tube: Dysphagia, Esophagitis, Hiatal hernia, Duodenitis.   • INCISION AND DRAINAGE ABSCESS  04/10/2013    I&D, Simple    • INJECTION OF MEDICATION  05/25/2016    Kenalog x3   • KNEE SURGERY     • KNEE SURGERY  05/12/2003    x3, Arthroscopic medial meniscectomy, right knee.   • OTHER SURGICAL HISTORY  09/11/1996    Forearm or wrist surgery: x2, Removal of K-wires, left radius    • OTHER SURGICAL HISTORY  04/25/1986    Hand/finger surgery: Repair radial collateral ligament, I&D, right fifth finger. Repair laceration.   • PROCTOSIGMOIDOSCOPY  04/23/1982    Proctosigmoidoscopy, Rigid: Normal   • REFRACTIVE SURGERY  12/29/2014    LASER SURGERY OF EYE: x2   • WRIST SURGERY         Visit Dx:     ICD-10-CM ICD-9-CM   1. Acute pain of left shoulder  M25.512 719.41         Patient History     Row Name 09/15/21 0849             History     "Date Current Problem(s) Began  -- July of 2021  -BB      Brief Description of Current Complaint  Present today with left shoulder pain. Reports not know cause of injury and reports just noticing bruising of the left arm. Notes some aching at night. Popping with movement. Has sharp pain with flexion AROM.    -BB      Patient/Caregiver Goals  Relieve pain  -BB      Hand Dominance  right-handed  -BB      Occupation/sports/leisure activities  Retired  -BB         Pain     Pain Location  Shoulder  -BB      Pain at Present  2 \"starting to ache\"   -BB      Pain at Best  3  -BB      Pain Description  Sharp;Aching  -BB      Is your sleep disturbed?  Yes  -BB         Fall Risk Assessment    Any falls in the past year:  No  -BB        User Key  (r) = Recorded By, (t) = Taken By, (c) = Cosigned By    Initials Name Provider Type    Leslie Juan PT DPT Physical Therapist          PT Ortho     Row Name 09/15/21 0849       Subjective Comments    Subjective Comments  see pt hx   -BB       Precautions and Contraindications    Precautions/Limitations  no known precautions/limitations  -BB    Precautions  will need future reverse TSA per MD  -BB       Subjective Pain    Pre-Treatment Pain Level  3  -BB    Post-Treatment Pain Level  3  -BB       Posture/Observations    Posture/Observations Comments  No acute distress. Mild decreased tone of the left shoulder compared to right   -BB       Special Tests/Palpation    Special Tests/Palpation  -- Global shoulder tenderness   -BB       General ROM    LT Upper Ext  Lt Shoulder ABduction;Lt Shoulder Flexion;Lt Shoulder External Rotation;Lt Shoulder Internal Rotation  -BB    GENERAL ROM COMMENTS  Forearm AROM is WNL   -BB       Left Upper Ext    Lt Shoulder Abduction AROM  80  -BB    Lt Shoulder Abduction PROM  -- aarom 125   -BB    Lt Shoulder Flexion AROM  85  -BB    Lt Shoulder Flexion PROM  -- AAROM 120   -BB    Lt Shoulder External Rotation AROM  38  -BB    Lt Shoulder Internal " Rotation AROM  hand to stomach   -BB       MMT (Manual Muscle Testing)    General MMT Comments  Bilateral composite  85lbs . Left MMT deferred due to significant loss of AROM   -BB       Sensation    Sensation WNL?  WNL  -BB      User Key  (r) = Recorded By, (t) = Taken By, (c) = Cosigned By    Initials Name Provider Type    Leslie Juan, PT DPT Physical Therapist                            PT OP Goals     Row Name 09/15/21 0849          PT Short Term Goals    STG Date to Achieve  10/06/21  -BB     STG 1  Independent in HEP and progression   -BB        Time Calculation    PT Goal Re-Cert Due Date  10/06/21  -BB       User Key  (r) = Recorded By, (t) = Taken By, (c) = Cosigned By    Initials Name Provider Type    Leslie Juan PT DPT Physical Therapist          PT Assessment/Plan     Row Name 09/15/21 0849          PT Assessment    Functional Limitations  Limitations in functional capacity and performance;Performance in leisure activities  -BB     Impairments  Pain;Muscle strength;Joint mobility;Range of motion;Poor body mechanics;Impaired muscle endurance  -BB     Assessment Comments  Patient presents today with left shoulder pain and ROM deficits with MRI showing significant labral tear, tear of supraspinatus, subscapularis, infraspinatus, bicep tendon, subdeltoid bursitis and AC jt OA. Patient instructed PT to focus of ROM and strength as pain allows.   -BB     Rehab Potential  Good  -BB     Patient/caregiver participated in establishment of treatment plan and goals  Yes  -BB     Patient would benefit from skilled therapy intervention  Yes  -BB        PT Plan    PT Frequency  2x/week  -BB     Predicted Duration of Therapy Intervention (PT)  2-4 visits per MD   -BB     Planned CPT's?  PT EVAL MOD COMPLELITY: 94657;PT THER PROC EA 15 MIN: 51157;PT THER ACT EA 15 MIN: 65407;PT RE-EVAL: 79871;PT MANUAL THERAPY EA 15 MIN: 25190;PT SELF CARE/HOME MGMT/TRAIN EA 15: 90516;PT THER SUPP EA 15 MIN  -BB     PT  "Plan Comments  Progress HEP for AAROM and AROM as pain allows, strength with pain as guide, wrist and elbow strength, coordination, ice. per MD \"improve function in available range\"  -BB       User Key  (r) = Recorded By, (t) = Taken By, (c) = Cosigned By    Initials Name Provider Type    eLslie Juan PT DPT Physical Therapist            OP Exercises     Row Name 09/15/21 0849             Subjective Comments    Subjective Comments  see pt hx   -BB         Subjective Pain    Able to rate subjective pain?  yes  -BB      Pre-Treatment Pain Level  3  -BB      Post-Treatment Pain Level  3  -BB         Exercise 1    Exercise Name 1  HEP for pulleys and putty   -BB         Exercise 2    Exercise Name 2  pulleys flex/abd   -BB      Reps 2  15 each   -BB        User Key  (r) = Recorded By, (t) = Taken By, (c) = Cosigned By    Initials Name Provider Type    Leslie Juan PT DPT Physical Therapist                                  Time Calculation:     Start Time: 0849  Stop Time: 0923  Time Calculation (min): 34 min     Therapy Charges for Today     Code Description Service Date Service Provider Modifiers Qty    [id number] HC PT EVAL MOD COMPLEXITY 3 9/15/2021 Leslie Griffin PT DPT GP 1                    JANET MenaT  9/16/2021      "

## 2021-09-30 ENCOUNTER — HOSPITAL ENCOUNTER (OUTPATIENT)
Dept: PHYSICAL THERAPY | Facility: HOSPITAL | Age: 71
Setting detail: THERAPIES SERIES
Discharge: HOME | End: 2021-09-30

## 2021-09-30 DIAGNOSIS — M25.512 ACUTE PAIN OF LEFT SHOULDER: Primary | ICD-10-CM

## 2021-09-30 PROCEDURE — 97110 THERAPEUTIC EXERCISES: CPT

## 2021-10-07 ENCOUNTER — APPOINTMENT (OUTPATIENT)
Dept: PHYSICAL THERAPY | Facility: HOSPITAL | Age: 71
End: 2021-10-07

## 2021-10-12 ENCOUNTER — HOSPITAL ENCOUNTER (OUTPATIENT)
Dept: PHYSICAL THERAPY | Facility: HOSPITAL | Age: 71
Setting detail: THERAPIES SERIES
Discharge: HOME | End: 2021-10-12

## 2021-10-12 DIAGNOSIS — M25.512 ACUTE PAIN OF LEFT SHOULDER: Primary | ICD-10-CM

## 2021-10-12 PROCEDURE — 97110 THERAPEUTIC EXERCISES: CPT

## 2021-10-12 NOTE — THERAPY PROGRESS REPORT/RE-CERT
Outpatient Physical Therapy Ortho Progress Note  [facility]     Patient Name: Kamlesh Moser  : 1950  MRN: [id number]  Today's Date: 10/12/2021      Visit Date: 10/12/2021   Attendance: 3/3 (5 approved)  Subjective Improvement: 0%  Next MD Visit: 10/21/2021  Recert Date: 2021    Therapy Diagnosis: Left shoulder pain      Visit Dx:    ICD-10-CM ICD-9-CM   1. Acute pain of left shoulder  M25.512 719.41       Patient Active Problem List   Diagnosis   • Arthritis   • GERD (gastroesophageal reflux disease)   • Hyperlipidemia   • Hypertension   • Primary osteoarthritis of both knees   • Primary open angle glaucoma of left eye, mild stage   • Cataract   • Osteoarthritis of multiple joints   • Fatty liver   • Gallstones   • Benign prostatic hyperplasia with incomplete bladder emptying   • Class 1 obesity due to excess calories with serious comorbidity and body mass index (BMI) of 33.0 to 33.9 in adult   • Acute pain of left shoulder        Past Medical History:   Diagnosis Date   • Acute maxillary sinusitis    • Allergic rhinitis      Moderately severe      • Arthritis    • Arthritis    • Atopic conjunctivitis    • Benign prostatic hyperplasia    • Bilateral hearing loss    • Cellulitis     CHESTWAL   • Chronic otitis externa    • Common cold    • Diabetes (HCC)    • Diverticular disease of colon    • Dyspnea    • Essential hypertension    • Fracture of pelvis (HCC)    • Fracture of wrist    • Fracture, radius    • Fracture, ulna    • GERD (gastroesophageal reflux disease)    • GERD (gastroesophageal reflux disease)    • Hiatal hernia    • Hiatal hernia    • Hyperlipidemia    • Hyperlipidemia    • Hypertension    • Hypertension    • Hypertensive disorder    • Immunization due    • Inflamed seborrheic keratosis    • Influenza vaccination given     Influenza vaccination   12/10/2013   • Low back pain    • Need for immunization against influenza    • Need for prophylactic vaccination against  Streptococcus pneumoniae (pneumococcus)    • Need for prophylactic vaccination with combined diphtheria-tetanus-pertussis (DTP) vaccine     Need for prophylactic vaccination and inoculation against Diptheria-tetanus-pertussis, combined [DTP] [DTaP]     • Nuclear senile cataract     moderate   • Onychomycosis    • Osteoarthritis of multiple joints    • Primary open angle glaucoma     Right eye IOP too high s/p LT      • Primary osteoarthritis of both knees    • Rhinitis    • Screening for malignant neoplasm of colon    • Screening for malignant neoplasm of prostate    • Shoulder pain    • Temporomandibular joint disorder    • Vitamin D deficiency         Past Surgical History:   Procedure Laterality Date   • BACK SURGERY     • BACK SURGERY  1982    Low back disk surgery    • CARDIAC CATHETERIZATION  02/08/2011    Symptoms of angina that have resolved with normalization of blood pressure. Very mild nonobstructive coronary artery disease. Normal systolic function.   • CRYOTHERAPY  06/07/2013    CRYOTHERAPY OF ACNE   • ENDOSCOPY AND COLONOSCOPY  01/16/2012    x2, Diverticulosis in sigmoid colon. Hemorrhoids in anus.   • ENDOSCOPY W/ PEG TUBE PLACEMENT  04/12/2002    EGD w/ tube: Dysphagia, Esophagitis, Hiatal hernia, Duodenitis.   • INCISION AND DRAINAGE ABSCESS  04/10/2013    I&D, Simple    • INJECTION OF MEDICATION  05/25/2016    Kenalog x3   • KNEE SURGERY     • KNEE SURGERY  05/12/2003    x3, Arthroscopic medial meniscectomy, right knee.   • OTHER SURGICAL HISTORY  09/11/1996    Forearm or wrist surgery: x2, Removal of K-wires, left radius    • OTHER SURGICAL HISTORY  04/25/1986    Hand/finger surgery: Repair radial collateral ligament, I&D, right fifth finger. Repair laceration.   • PROCTOSIGMOIDOSCOPY  04/23/1982    Proctosigmoidoscopy, Rigid: Normal   • REFRACTIVE SURGERY  12/29/2014    LASER SURGERY OF EYE: x2   • WRIST SURGERY          PT Ortho     Row Name 10/12/21 0800       Subjective Comments    Subjective  Comments Pt stated that his shoulder hurts anytime he moves it. He stated that when he moves it, he can fill it slipping.  -       Precautions and Contraindications    Precautions/Limitations no known precautions/limitations  -    Precautions will need future reverse TSA per MD  -       Subjective Pain    Able to rate subjective pain? yes  -    Pre-Treatment Pain Level 4  -    Post-Treatment Pain Level 4  -       Posture/Observations    Posture/Observations Comments No TTP. No edema. Mild forward head and rounded shoulder posture.  -       Left Upper Ext    Lt Shoulder Abduction AROM 78 deg; pain  -    Lt Shoulder Abduction PROM AAROM 110 deg  -    Lt Shoulder Flexion AROM 90 deg; pain  -    Lt Shoulder Flexion PROM AAROM 112 deg  -    Lt Shoulder External Rotation AROM 40 deg; pain  -    Lt Shoulder Internal Rotation AROM Hand to stomach  -       MMT (Manual Muscle Testing)    Lt Lower Ext Comments  -       MMT Left Lower Ext    Lt Lower Extremity Comments  Not formally tested due to pain with AROM. 3-/5 grossly as pt is able to lift arm through partial ROM without resistance.  -       Sensation    Sensation WNL? WNL  -          User Key  (r) = Recorded By, (t) = Taken By, (c) = Cosigned By    Initials Name Provider Type     Jania Jackson, PT Physical Therapist                             PT Assessment/Plan     Row Name 10/12/21 0800          PT Assessment    Functional Limitations Limitations in functional capacity and performance; Performance in leisure activities  -     Impairments Pain; Muscle strength; Joint mobility; Range of motion; Poor body mechanics; Impaired muscle endurance  -     Assessment Comments Recheck completed this visits. Pt has not met his goal yet as he was only able to attend one treatment session since his initial evaluation and was not 100% on his HEP. Objective measures are similar to where they were during the initial evaluation. Time was spent  reviewing pt's HEP. Pt distributed a red tband to add tband shoulder exercises to his HEP. Initiated shoulder isometrics and chest press this date. All exercises irritated pt “a little bit” per pt report.  -     Rehab Potential Good  -     Patient/caregiver participated in establishment of treatment plan and goals Yes  -            PT Plan    PT Frequency 1x/week  -     Predicted Duration of Therapy Intervention (PT) 1 more visit  -     PT Plan Comments Update/review/finalize HEP and then d/c.  -           User Key  (r) = Recorded By, (t) = Taken By, (c) = Cosigned By    Initials Name Provider Type     Jania Jackson, PT Physical Therapist                 Modalities     Row Name 10/12/21 0800             Ice    Patient denies application of Ice Yes  -      Patient reports will apply ice at home to involved area Yes  -            User Key  (r) = Recorded By, (t) = Taken By, (c) = Cosigned By    Initials Name Provider Type     Jania Jackson, PT Physical Therapist               OP Exercises     Row Name 10/12/21 0800             Subjective Comments    Subjective Comments Pt stated that his shoulder hurts anytime he moves it. He stated that when he moves it, he can fill it slipping.  -              Subjective Pain    Able to rate subjective pain? yes  -      Pre-Treatment Pain Level 4  -      Post-Treatment Pain Level 4  -              Total Minutes    79536 - PT Therapeutic Exercise Minutes 44  -              Exercise 1    Exercise Name 1 Pro II UE; strength  -      Time 1 10 min  -              Exercise 2    Exercise Name 2 3 Way Shoulder Pulley  -      Cueing 2 Verbal  -      Sets 2 1  -      Reps 2 20 each  -              Exercise 3    Exercise Name 3 Sup AAROM with Wand: Flex, Abd  -      Sets 3 1  -      Reps 3 20 each  -      Additional Comments wand only  -              Exercise 4    Exercise Name 4 Supine chest press with SA punch with wand  -      Cueing 4  Verbal; Tactile; Demo  -      Sets 4 1  -MH      Reps 4 20  -MH      Additional Comments 2# wand  -MH              Exercise 5    Exercise Name 5 Recheck  -              Exercise 6    Exercise Name 6 Shoulder TB: Ext, Add, IR  -MH      Cueing 6 Verbal; Tactile; Demo  -      Sets 6 1  -MH      Reps 6 20 each  -MH      Additional Comments RTB  -              Exercise 7    Exercise Name 7 Shoulder isometrics: flex, abd  -MH      Cueing 7 Tactile; Verbal; Demo  -      Sets 7 1  -MH      Reps 7 15  -MH      Time 7 3 sec hold  -      Additional Comments cueing for gentle isometrics/light pressure  -            User Key  (r) = Recorded By, (t) = Taken By, (c) = Cosigned By    Initials Name Provider Type     Jania Jackson, PT Physical Therapist                              PT OP Goals     Row Name 10/12/21 0800          PT Short Term Goals    STG Date to Achieve 11/02/21  -     STG 1 Independent in HEP and progression   -     STG 1 Progress Not Met  -            Time Calculation    PT Goal Re-Cert Due Date 11/02/21  -           User Key  (r) = Recorded By, (t) = Taken By, (c) = Cosigned By    Initials Name Provider Type     Jania Jackson, PT Physical Therapist                Therapy Education  Education Details: HEP: Pulley 3-wayjames shld ext, add, IR.  Given: HEP  Program: Reinforced  How Provided: Verbal, Demonstration  Provided to: Patient  Level of Understanding: Verbalized, Demonstrated              Time Calculation:   Start Time: 0840  Stop Time: 0924  Time Calculation (min): 44 min  Timed Charges  31866 - PT Therapeutic Exercise Minutes: 44  Total Minutes  Timed Charges Total Minutes: 44   Total Minutes: 44  Therapy Charges for Today     Code Description Service Date Service Provider Modifiers Qty    [id number] HC PT THER PROC EA 15 MIN 10/12/2021 Jania Jackson, PT GP 3                    Jania Jackson PT  10/12/2021

## 2021-10-13 DIAGNOSIS — M25.561 ACUTE PAIN OF BOTH KNEES: Primary | ICD-10-CM

## 2021-10-13 DIAGNOSIS — M25.562 ACUTE PAIN OF BOTH KNEES: Primary | ICD-10-CM

## 2021-10-14 ENCOUNTER — OFFICE VISIT (OUTPATIENT)
Dept: ORTHOPEDIC SURGERY | Facility: CLINIC | Age: 71
End: 2021-10-14

## 2021-10-14 VITALS — WEIGHT: 223 LBS | BODY MASS INDEX: 33.03 KG/M2 | HEIGHT: 69 IN

## 2021-10-14 DIAGNOSIS — M17.0 PRIMARY OSTEOARTHRITIS OF BOTH KNEES: Primary | ICD-10-CM

## 2021-10-14 DIAGNOSIS — I10 ESSENTIAL HYPERTENSION: ICD-10-CM

## 2021-10-14 DIAGNOSIS — K21.9 GASTROESOPHAGEAL REFLUX DISEASE WITHOUT ESOPHAGITIS: ICD-10-CM

## 2021-10-14 DIAGNOSIS — M25.562 ACUTE PAIN OF BOTH KNEES: ICD-10-CM

## 2021-10-14 DIAGNOSIS — M25.561 ACUTE PAIN OF BOTH KNEES: ICD-10-CM

## 2021-10-14 PROCEDURE — 99213 OFFICE O/P EST LOW 20 MIN: CPT | Performed by: ORTHOPAEDIC SURGERY

## 2021-10-14 NOTE — PROGRESS NOTES
Kamlesh Moser is a 71 y.o. male   Primary provider:  Blanco Drummond MD       Chief Complaint   Patient presents with   • Right Knee - Pain   • Left Knee - Pain       HISTORY OF PRESENT ILLNESS:    Patient injured knees 12/10/2003 while working at [employer]. Surgery done in the past by Dr. Frank and Dr. Burrows.   He is reporting bilateral knee pain.  No specific recent injury.  He has dull achy pain and grinding with activity but is not having pain at rest.  He has some limitation in activity due to the loss of motion and strength in his knees.  No fevers or chills.  No numbness or tingling.    Pain  This is a recurrent (12/10/2003) problem. The problem occurs intermittently. The problem has been unchanged. Associated symptoms include coughing and joint swelling. Associated symptoms comments: Crushing, grinding, clicking, popping. . The symptoms are aggravated by standing and walking. Treatments tried: injections.         CONCURRENT MEDICAL HISTORY:    Past Medical History:   Diagnosis Date   • Acute maxillary sinusitis    • Allergic rhinitis      Moderately severe      • Arthritis    • Arthritis    • Atopic conjunctivitis    • Benign prostatic hyperplasia    • Bilateral hearing loss    • Cellulitis     CHESTWAL   • Chronic otitis externa    • Common cold    • Diabetes (HCC)    • Diverticular disease of colon    • Dyspnea    • Essential hypertension    • Fracture of pelvis (HCC)    • Fracture of wrist    • Fracture, radius    • Fracture, ulna    • GERD (gastroesophageal reflux disease)    • GERD (gastroesophageal reflux disease)    • Hiatal hernia    • Hiatal hernia    • Hyperlipidemia    • Hyperlipidemia    • Hypertension    • Hypertension    • Hypertensive disorder    • Immunization due    • Inflamed seborrheic keratosis    • Influenza vaccination given     Influenza vaccination   12/10/2013   • Low back pain    • Need for immunization against influenza    • Need for prophylactic vaccination against  Streptococcus pneumoniae (pneumococcus)    • Need for prophylactic vaccination with combined diphtheria-tetanus-pertussis (DTP) vaccine     Need for prophylactic vaccination and inoculation against Diptheria-tetanus-pertussis, combined [DTP] [DTaP]     • Nuclear senile cataract     moderate   • Onychomycosis    • Osteoarthritis of multiple joints    • Primary open angle glaucoma     Right eye IOP too high s/p LT      • Primary osteoarthritis of both knees    • Rhinitis    • Screening for malignant neoplasm of colon    • Screening for malignant neoplasm of prostate    • Shoulder pain    • Temporomandibular joint disorder    • Vitamin D deficiency        Allergies   Allergen Reactions   • Penicillins Unknown - Low Severity         Current Outpatient Medications:   •  AZOPT 1 % ophthalmic suspension, Administer 1 drop to both eyes 3 (Three) Times a Day., Disp: 10 mL, Rfl: 5  •  diazePAM (Valium) 10 MG tablet, Take 1 pill 30 min prior to MRI and another at time of MRI if necessary, Disp: 2 tablet, Rfl: 0  •  finasteride (PROSCAR) 5 MG tablet, Take 1 tablet by mouth Daily., Disp: 90 tablet, Rfl: 3  •  fluocinolone (SYNALAR) 0.01 % external solution, APPLY TWO TIMES A DAY..3 DROPS EACH EAR X 7 DAYS, Disp: 60 mL, Rfl: 1  •  fluticasone (Flonase) 50 MCG/ACT nasal spray, 2 sprays into the nostril(s) as directed by provider Daily., Disp: 48 mL, Rfl: 3  •  meloxicam (MOBIC) 15 MG tablet, Use qd prn arth pain only, Disp: 60 tablet, Rfl: 3  •  montelukast (SINGULAIR) 10 MG tablet, Take 1 tablet by mouth every night at bedtime., Disp: 90 tablet, Rfl: 3  •  omeprazole (priLOSEC) 20 MG capsule, Take 1 capsule by mouth Daily. Prn reflux, Disp: 90 capsule, Rfl: 1  •  pravastatin (PRAVACHOL) 40 MG tablet, Take 1 tablet by mouth Daily., Disp: 90 tablet, Rfl: 3  •  tamsulosin (FLOMAX) 0.4 MG capsule 24 hr capsule, Take 1 capsule by mouth Daily., Disp: 90 capsule, Rfl: 3  •  triamterene-hydrochlorothiazide (MAXZIDE-25) 37.5-25 MG per  tablet, Take 1 tablet by mouth Daily. for blood pressure, Disp: 90 tablet, Rfl: 3    Past Surgical History:   Procedure Laterality Date   • BACK SURGERY     • BACK SURGERY  1982    Low back disk surgery    • CARDIAC CATHETERIZATION  02/08/2011    Symptoms of angina that have resolved with normalization of blood pressure. Very mild nonobstructive coronary artery disease. Normal systolic function.   • CRYOTHERAPY  06/07/2013    CRYOTHERAPY OF ACNE   • ENDOSCOPY AND COLONOSCOPY  01/16/2012    x2, Diverticulosis in sigmoid colon. Hemorrhoids in anus.   • ENDOSCOPY W/ PEG TUBE PLACEMENT  04/12/2002    EGD w/ tube: Dysphagia, Esophagitis, Hiatal hernia, Duodenitis.   • INCISION AND DRAINAGE ABSCESS  04/10/2013    I&D, Simple    • INJECTION OF MEDICATION  05/25/2016    Kenalog x3   • KNEE SURGERY     • KNEE SURGERY  05/12/2003    x3, Arthroscopic medial meniscectomy, right knee.   • OTHER SURGICAL HISTORY  09/11/1996    Forearm or wrist surgery: x2, Removal of K-wires, left radius    • OTHER SURGICAL HISTORY  04/25/1986    Hand/finger surgery: Repair radial collateral ligament, I&D, right fifth finger. Repair laceration.   • PROCTOSIGMOIDOSCOPY  04/23/1982    Proctosigmoidoscopy, Rigid: Normal   • REFRACTIVE SURGERY  12/29/2014    LASER SURGERY OF EYE: x2   • WRIST SURGERY         Family History   Problem Relation Age of Onset   • Diabetes Mother    • Heart disease Mother 60        MI   • Arthritis Mother    • Hypertension Mother    • Hyperlipidemia Mother    • Kidney disease Mother    • Thyroid disease Mother    • Diabetes Father    • Heart disease Father    • Arthritis Father    • Hypertension Father    • Hyperlipidemia Father    • Stroke Father 80   • Diabetes Other    • Heart disease Other    • Hypertension Other    • Heart disease Brother 40        cabg   • Cancer Neg Hx        Social History     Socioeconomic History   • Marital status:    Tobacco Use   • Smoking status: Former Smoker   • Smokeless tobacco:  "Never Used   Substance and Sexual Activity   • Alcohol use: Yes     Comment: social   • Drug use: No   • Sexual activity: Defer        Review of Systems   HENT: Positive for hearing loss.         Ringing in ears.    Eyes: Positive for pain and visual disturbance.   Respiratory: Positive for cough.    Musculoskeletal: Positive for joint swelling.        Joint pain, stiffness, muscle pain.   All other systems reviewed and are negative.      PHYSICAL EXAMINATION:       Ht 175.3 cm (69\")   Wt 101 kg (223 lb)   BMI 32.93 kg/m²     Physical Exam  Constitutional:       General: He is not in acute distress.     Appearance: Normal appearance.   Pulmonary:      Effort: Pulmonary effort is normal. No respiratory distress.   Neurological:      Mental Status: He is alert and oriented to person, place, and time.         GAIT:     []  Normal  []  Antalgic    Assistive device: [x]  None  []  Walker     []  Crutches  []  Cane     []  Wheelchair  []  Stretcher    Right Knee Exam     Muscle Strength   The patient has normal right knee strength.    Tenderness   Right knee tenderness location: diffuse.    Range of Motion   Extension: -5   Flexion: 110     Tests   Drawer:  Anterior - negative    Posterior - negative    Other   Sensation: normal  Pulse: present  Swelling: mild    Comments:  Crepitation on motion.  Mild pain through arc of motion  He has mild laxity and pain with varus stress.      Left Knee Exam     Muscle Strength   The patient has normal left knee strength.    Tenderness   Left knee tenderness location: diffuse.    Range of Motion   Extension: -5   Flexion: 110     Tests   Drawer:  Anterior - negative     Posterior - negative    Other   Sensation: normal  Pulse: present  Swelling: none    Comments:  Crepitation with motion  Mild pain through arc of motion  He has mild laxity and pain with varus stress.                  XR Knee Bilateral AP Standing    Result Date: 10/14/2021  Narrative: Ordering Provider:  Kike, " Elias Flores MD Ordering Diagnosis/Indication:  Acute pain of both knees Procedure:  XR KNEE BILATERAL AP STANDING Exam Date:  10/14/21 COMPARISON:  Not applicable, no relevant images available.     Impression:  AP bilateral standing of the knees with lateral of each knee show varus positioning in both knees with near complete medial joint space collapse in each knee.  Flattening of the medial femoral condyle is noted.  Mild lateral subluxation of the tibia is noted on each side.  Mild patellofemoral joint arthritic change noted in each knee.  There are posterior osteophytes present in both knees as well.  End stage osteoarthritic changes in both knees with advanced medial arthritic changes in both knees. Elias Stokes MD 10/14/21     XR Knee 1 or 2 View Bilateral    Result Date: 10/14/2021  Narrative: Ordering Provider:  Elias Stokes MD Ordering Diagnosis/Indication:  Acute pain of both knees Procedure:  XR KNEE 1 OR 2 VW BILATERAL Exam Date:  10/14/21 COMPARISON:  Not applicable, no relevant images available.     Impression:  AP bilateral standing of the knees with lateral of each knee show varus positioning in both knees with near complete medial joint space collapse in each knee.  Flattening of the medial femoral condyle is noted.  Mild lateral subluxation of the tibia is noted on each side.  Mild patellofemoral joint arthritic change noted in each knee.  There are posterior osteophytes present in both knees as well.  End stage osteoarthritic changes in both knees with advanced medial arthritic changes in both knees. Elias Stokes MD 10/14/21           ASSESSMENT:    Diagnoses and all orders for this visit:    Primary osteoarthritis of both knees    Acute pain of both knees  -     Miscellaneous DME    Essential hypertension    Gastroesophageal reflux disease without esophagitis          PLAN    Patient has significant osteoarthritis in both knees.  He does not want steroid  injections.  We discussed the possibility of viscosupplementation.  He wants to maintain mobility and maintain activity as much as possible.    We will proceed with viscosupplementation.    We also discussed use of medial  brace for both knees.    The patient was prescribed a medial  brace which contains an adjustable flexion and extension joint and is designed to help relieve medial compartment knee pain caused by osteoarthritis.  This osteoarthritis brace is intended as an adjunct to the other modalities in the treatment of unicompartmental knee pain with arthritis.  If this allows the to continue to manage activities of daily living and activity then we will continue to monitor with an occasional steroid injection.  Otherwise, be began the discussion for possible joint replacement surgery, UNI as an option pending valgus stress view.      Return for visco-supplementation.    Elias Stokes MD

## 2021-10-19 ENCOUNTER — APPOINTMENT (OUTPATIENT)
Dept: PHYSICAL THERAPY | Facility: HOSPITAL | Age: 71
End: 2021-10-19

## 2021-10-21 ENCOUNTER — OFFICE VISIT (OUTPATIENT)
Dept: ORTHOPEDIC SURGERY | Facility: CLINIC | Age: 71
End: 2021-10-21

## 2021-10-21 ENCOUNTER — APPOINTMENT (OUTPATIENT)
Dept: PHYSICAL THERAPY | Facility: HOSPITAL | Age: 71
End: 2021-10-21

## 2021-10-21 ENCOUNTER — HOSPITAL ENCOUNTER (OUTPATIENT)
Dept: PHYSICAL THERAPY | Facility: HOSPITAL | Age: 71
Setting detail: THERAPIES SERIES
Discharge: HOME | End: 2021-10-21

## 2021-10-21 VITALS — BODY MASS INDEX: 33.03 KG/M2 | WEIGHT: 223 LBS | HEIGHT: 69 IN

## 2021-10-21 DIAGNOSIS — K21.9 GASTROESOPHAGEAL REFLUX DISEASE WITHOUT ESOPHAGITIS: ICD-10-CM

## 2021-10-21 DIAGNOSIS — M12.812 ROTATOR CUFF ARTHROPATHY, LEFT: Primary | ICD-10-CM

## 2021-10-21 DIAGNOSIS — I10 ESSENTIAL HYPERTENSION: ICD-10-CM

## 2021-10-21 DIAGNOSIS — M25.512 ACUTE PAIN OF LEFT SHOULDER: ICD-10-CM

## 2021-10-21 DIAGNOSIS — M25.512 ACUTE PAIN OF LEFT SHOULDER: Primary | ICD-10-CM

## 2021-10-21 PROCEDURE — 99213 OFFICE O/P EST LOW 20 MIN: CPT | Performed by: ORTHOPAEDIC SURGERY

## 2021-10-21 PROCEDURE — 97110 THERAPEUTIC EXERCISES: CPT

## 2021-10-21 NOTE — THERAPY DISCHARGE NOTE
Outpatient Physical Therapy Ortho Treatment Note/Discharge Summary  [facility]     Patient Name: Kamlesh Moser  : 1950  MRN: [id number]  Today's Date: 10/21/2021      Visit Date: 10/21/2021     Subjective Improvement 0  Visits 4/4  Visits approved 4  RTMD PRN  Recert Date N/A    Left shoulder pain    Visit Dx:    ICD-10-CM ICD-9-CM   1. Acute pain of left shoulder  M25.512 719.41       Patient Active Problem List   Diagnosis   • Arthritis   • GERD (gastroesophageal reflux disease)   • Hyperlipidemia   • Hypertension   • Primary osteoarthritis of both knees   • Primary open angle glaucoma of left eye, mild stage   • Cataract   • Osteoarthritis of multiple joints   • Fatty liver   • Gallstones   • Benign prostatic hyperplasia with incomplete bladder emptying   • Class 1 obesity due to excess calories with serious comorbidity and body mass index (BMI) of 33.0 to 33.9 in adult   • Acute pain of left shoulder   • Acute pain of both knees        Past Medical History:   Diagnosis Date   • Acute maxillary sinusitis    • Allergic rhinitis      Moderately severe      • Arthritis    • Arthritis    • Atopic conjunctivitis    • Benign prostatic hyperplasia    • Bilateral hearing loss    • Cellulitis     CHESTWAL   • Chronic otitis externa    • Common cold    • Diabetes (HCC)    • Diverticular disease of colon    • Dyspnea    • Essential hypertension    • Fracture of pelvis (HCC)    • Fracture of wrist    • Fracture, radius    • Fracture, ulna    • GERD (gastroesophageal reflux disease)    • GERD (gastroesophageal reflux disease)    • Hiatal hernia    • Hiatal hernia    • Hyperlipidemia    • Hyperlipidemia    • Hypertension    • Hypertension    • Hypertensive disorder    • Immunization due    • Inflamed seborrheic keratosis    • Influenza vaccination given     Influenza vaccination   12/10/2013   • Low back pain    • Need for immunization against influenza    • Need for prophylactic vaccination against  Streptococcus pneumoniae (pneumococcus)    • Need for prophylactic vaccination with combined diphtheria-tetanus-pertussis (DTP) vaccine     Need for prophylactic vaccination and inoculation against Diptheria-tetanus-pertussis, combined [DTP] [DTaP]     • Nuclear senile cataract     moderate   • Onychomycosis    • Osteoarthritis of multiple joints    • Primary open angle glaucoma     Right eye IOP too high s/p LT      • Primary osteoarthritis of both knees    • Rhinitis    • Screening for malignant neoplasm of colon    • Screening for malignant neoplasm of prostate    • Shoulder pain    • Temporomandibular joint disorder    • Vitamin D deficiency         Past Surgical History:   Procedure Laterality Date   • BACK SURGERY     • BACK SURGERY  1982    Low back disk surgery    • CARDIAC CATHETERIZATION  02/08/2011    Symptoms of angina that have resolved with normalization of blood pressure. Very mild nonobstructive coronary artery disease. Normal systolic function.   • CRYOTHERAPY  06/07/2013    CRYOTHERAPY OF ACNE   • ENDOSCOPY AND COLONOSCOPY  01/16/2012    x2, Diverticulosis in sigmoid colon. Hemorrhoids in anus.   • ENDOSCOPY W/ PEG TUBE PLACEMENT  04/12/2002    EGD w/ tube: Dysphagia, Esophagitis, Hiatal hernia, Duodenitis.   • INCISION AND DRAINAGE ABSCESS  04/10/2013    I&D, Simple    • INJECTION OF MEDICATION  05/25/2016    Kenalog x3   • KNEE SURGERY     • KNEE SURGERY  05/12/2003    x3, Arthroscopic medial meniscectomy, right knee.   • OTHER SURGICAL HISTORY  09/11/1996    Forearm or wrist surgery: x2, Removal of K-wires, left radius    • OTHER SURGICAL HISTORY  04/25/1986    Hand/finger surgery: Repair radial collateral ligament, I&D, right fifth finger. Repair laceration.   • PROCTOSIGMOIDOSCOPY  04/23/1982    Proctosigmoidoscopy, Rigid: Normal   • REFRACTIVE SURGERY  12/29/2014    LASER SURGERY OF EYE: x2   • WRIST SURGERY                          PT Assessment/Plan     Row Name 10/21/21 1108          PT  Assessment    Assessment Comments Patient tolerated tband well except he did have some increase pain with standing shoulder fl with tband  -CP            PT Plan    PT Plan Comments D/C to home with HEP  -CP           User Key  (r) = Recorded By, (t) = Taken By, (c) = Cosigned By    Initials Name Provider Type    CP Yenni Segal, PTA Physical Therapy Assistant                     OP Exercises     Row Name 10/21/21 1111 10/21/21 1000          Subjective Comments    Subjective Comments -- patient reports no pain at rest but does have pain with reaching out to side.  -CP            Subjective Pain    Able to rate subjective pain? -- yes  -CP     Pre-Treatment Pain Level -- 4  -CP     Post-Treatment Pain Level -- 4  -CP     Subjective Pain Comment -- pain with reaching out to side  -CP            Total Minutes    [id number] - PT Therapeutic Exercise Minutes 40  -CP --            Exercise 1    Exercise Name 1 -- Pro II level 3  -CP     Time 1 -- 10  -CP     Additional Comments -- FW/BW  -CP            Exercise 2    Exercise Name 2 -- pulleys fl and scap  -CP     Cueing 2 -- Verbal  -CP     Reps 2 -- 20 each  -CP            Exercise 3    Exercise Name 3 -- standing scap rows with tband  -CP     Cueing 3 -- Verbal; Demo  -CP     Sets 3 -- 2  -CP     Reps 3 -- 10  -CP     Time 3 -- green  -CP            Exercise 4    Exercise Name 4 -- standing shoulder ext with tband  -CP     Cueing 4 -- Verbal; Demo  -CP     Sets 4 -- 2  -CP     Reps 4 -- 10  -CP     Time 4 -- red  -CP            Exercise 5    Exercise Name 5 -- shoulder AD with tband  -CP     Cueing 5 -- Verbal; Demo  -CP     Sets 5 -- 2  -CP     Reps 5 -- 10  -CP     Time 5 -- green  -CP            Exercise 6    Exercise Name 6 -- shoulder fl with tband  -CP     Cueing 6 -- Verbal; Demo  -CP     Sets 6 -- 2  -CP     Reps 6 -- 10  -CP     Time 6 -- red  -CP            Exercise 7    Exercise Name 7 -- shoulder IR with tband  -CP     Cueing 7 -- Verbal; Demo  -CP     Sets  "7 -- 2  -CP     Reps 7 -- 10  -CP     Time 7 -- green  -CP            Exercise 8    Exercise Name 8 -- sholder ER with tband  -CP     Cueing 8 -- Verbal; Demo  -CP     Sets 8 -- 2  -CP     Reps 8 -- 10  -CP     Time 8 -- red  -CP            Exercise 9    Exercise Name 9 -- Sl\"ing AB  -CP     Cueing 9 -- Verbal; Tactile  -CP     Sets 9 -- 2  -CP     Reps 9 -- 10  -CP           User Key  (r) = Recorded By, (t) = Taken By, (c) = Cosigned By    Initials Name Provider Type    Yenni Teran, ESTER Physical Therapy Assistant                                PT OP Goals     Row Name 10/21/21 1000          PT Short Term Goals    STG Date to Achieve 11/02/21  -CP     STG 1 Independent in HEP and progression   -CP     STG 1 Progress Progressing  -CP            Time Calculation    PT Goal Re-Cert Due Date 11/02/21  -CP           User Key  (r) = Recorded By, (t) = Taken By, (c) = Cosigned By    Initials Name Provider Type    CP Yenni Segal PTA Physical Therapy Assistant                Therapy Education  Education Details: SL'ing AB, Tband: scap rows, shoulder ext, fl, ext IR,ER, AD  Given: HEP  Program: New  How Provided: Verbal, Demonstration, Written  Provided to: Patient  Level of Understanding: Teach back education performed, Verbalized, Demonstrated              Time Calculation:   Start Time: 1015  Stop Time: 1055  Time Calculation (min): 40 min  Total Timed Code Minutes- PT: 40 minute(s)  Timed Charges  11032 - PT Therapeutic Exercise Minutes: 40  Total Minutes  Timed Charges Total Minutes: 40   Total Minutes: 40  Therapy Charges for Today     Code Description Service Date Service Provider Modifiers Qty    [id number] HC PT THER PROC EA 15 MIN 10/21/2021 Yenni Segal PTA GP 3                OP PT Discharge Summary  Date of Discharge: 10/21/21  Reason for Discharge: other (comment)  Outcomes Achieved: Unable to make functional progress toward goals at this time  Discharge Instructions/Additional Comments: " HEP given to patient      Yenni HUNT Segal, PTA  10/21/2021

## 2021-10-21 NOTE — PROGRESS NOTES
"Kamlesh Moser is a 71 y.o. male returns for     Chief Complaint   Patient presents with   • Left Shoulder - Follow-up       HISTORY OF PRESENT ILLNESS:  Patient in for follow up on left shoulder pain  He reports, he does not have pain in his left shoulder until he moves it a certain way.          CONCURRENT MEDICAL HISTORY:    The following portions of the patient's history were reviewed and updated as appropriate: allergies, current medications, past family history, past medical history, past social history, past surgical history and problem list.     ROS  No fevers or chills.  No chest pain or shortness of air.  No GI or  disturbances.    PHYSICAL EXAMINATION:       Ht 175.3 cm (69\")   Wt 101 kg (223 lb)   BMI 32.93 kg/m²     Physical Exam  Constitutional:       General: He is not in acute distress.     Appearance: Normal appearance.   Pulmonary:      Effort: Pulmonary effort is normal. No respiratory distress.   Neurological:      Mental Status: He is alert and oriented to person, place, and time.           Left Shoulder Exam     Tenderness   The patient is experiencing tenderness in the acromioclavicular joint and acromion.    Range of Motion   Active abduction: 100   Passive abduction: 150   Forward flexion: 120     Muscle Strength   Abduction: 3/5   Supraspinatus: 3/5     Tests   Boyle test: positive  Cross arm: positive  Impingement: positive    Other   Erythema: absent  Sensation: normal  Pulse: present               XR Knee Bilateral AP Standing    Result Date: 10/14/2021  Narrative: Ordering Provider:  Elias Stokes MD Ordering Diagnosis/Indication:  Acute pain of both knees Procedure:  XR KNEE BILATERAL AP STANDING Exam Date:  10/14/21 COMPARISON:  Not applicable, no relevant images available.     Impression:  AP bilateral standing of the knees with lateral of each knee show varus positioning in both knees with near complete medial joint space collapse in each knee.  Flattening of " the medial femoral condyle is noted.  Mild lateral subluxation of the tibia is noted on each side.  Mild patellofemoral joint arthritic change noted in each knee.  There are posterior osteophytes present in both knees as well.  End stage osteoarthritic changes in both knees with advanced medial arthritic changes in both knees. Elias Stokes MD 10/14/21     XR Knee 1 or 2 View Bilateral    Result Date: 10/14/2021  Narrative: Ordering Provider:  Elias Stokes MD Ordering Diagnosis/Indication:  Acute pain of both knees Procedure:  XR KNEE 1 OR 2 VW BILATERAL Exam Date:  10/14/21 COMPARISON:  Not applicable, no relevant images available.     Impression:  AP bilateral standing of the knees with lateral of each knee show varus positioning in both knees with near complete medial joint space collapse in each knee.  Flattening of the medial femoral condyle is noted.  Mild lateral subluxation of the tibia is noted on each side.  Mild patellofemoral joint arthritic change noted in each knee.  There are posterior osteophytes present in both knees as well.  End stage osteoarthritic changes in both knees with advanced medial arthritic changes in both knees. Elias Stokes MD 10/14/21             ASSESSMENT:    Diagnoses and all orders for this visit:    Rotator cuff arthropathy, left    Acute pain of left shoulder    Essential hypertension    Gastroesophageal reflux disease without esophagitis          PLAN    Injection did help some.  He has been doing some home exercises for general motion and strengthening exercises.  We again discussed that surgical intervention would be reverse total shoulder arthroplasty.  He currently is able to do enough of the things that he wants to do that surgery is not on the radar at this point.  Continue activity as tolerated.  We discussed probable repeat steroid injection in December to help with mobility and general pain relief.    Return in about 8 weeks (around  12/16/2021) for recheck.    Elias Stokes MD

## 2021-10-26 ENCOUNTER — CLINICAL SUPPORT (OUTPATIENT)
Dept: ORTHOPEDIC SURGERY | Facility: CLINIC | Age: 71
End: 2021-10-26

## 2021-10-26 VITALS — BODY MASS INDEX: 33.03 KG/M2 | WEIGHT: 223 LBS | HEIGHT: 69 IN

## 2021-10-26 DIAGNOSIS — M25.562 ACUTE PAIN OF BOTH KNEES: ICD-10-CM

## 2021-10-26 DIAGNOSIS — M17.0 PRIMARY OSTEOARTHRITIS OF BOTH KNEES: Primary | ICD-10-CM

## 2021-10-26 DIAGNOSIS — M25.561 ACUTE PAIN OF BOTH KNEES: ICD-10-CM

## 2021-10-26 PROCEDURE — 20610 DRAIN/INJ JOINT/BURSA W/O US: CPT | Performed by: ORTHOPAEDIC SURGERY

## 2021-10-26 NOTE — PROGRESS NOTES
"Knee Joint Injection      Patient: Kamlesh Moser    YOB: 1950    Chief Complaint   Patient presents with   • Right Knee - Follow-up   • Left Knee - Follow-up       History of Present Illness:  Patient is here for an injection.  No new complaints.    Physical Exam: 71 y.o. male  General Appearance:    Alert, cooperative, in no acute distress                   Vitals:    10/26/21 1233   Weight: 101 kg (223 lb)   Height: 175.3 cm (69\")   PainSc: 0-No pain        Patient is alert and oriented, ×3 no acute distress.  Affect is normal respiratory rate is normal unlabored.  Exam and complaints are unchanged.    Procedure:  Large Joint Arthrocentesis: R knee  Date/Time: 10/26/2021 12:32 PM  Consent given by: patient  Site marked: site marked  Timeout: Immediately prior to procedure a time out was called to verify the correct patient, procedure, equipment, support staff and site/side marked as required   Supporting Documentation  Indications: pain   Procedure Details  Location: knee - R knee  Preparation: Patient was prepped and draped in the usual sterile fashion  Needle size: 22 G  Medications administered: 30 mg Cross-Linked Hyaluronate 30 MG/3ML  Patient tolerance: patient tolerated the procedure well with no immediate complications    Large Joint Arthrocentesis: L knee  Date/Time: 10/26/2021 12:33 PM  Consent given by: patient  Site marked: site marked  Timeout: Immediately prior to procedure a time out was called to verify the correct patient, procedure, equipment, support staff and site/side marked as required   Supporting Documentation  Indications: pain   Procedure Details  Location: knee - L knee  Preparation: Patient was prepped and draped in the usual sterile fashion  Needle size: 22 G  Medications administered: 30 mg Cross-Linked Hyaluronate 30 MG/3ML  Patient tolerance: patient tolerated the procedure well with no immediate complications          Assessment:    Diagnoses and all orders for " this visit:    Primary osteoarthritis of both knees  -     Large Joint Arthrocentesis: R knee  -     Large Joint Arthrocentesis: L knee    Acute pain of both knees        Plan:   Slowly increase activity as tolerated.  Discussed importance of leg strengthening and general conditioning.  Discussed warning signs of injection.  Discussed that purpose of injections was symptom improvement and improved activity.  Also discussed that further treatment options depended on symptoms at followup and length of time of improvement after injections.    Return if symptoms worsen or fail to improve, for recheck.    Elias Stokes MD

## 2021-10-27 ENCOUNTER — APPOINTMENT (OUTPATIENT)
Dept: PHYSICAL THERAPY | Facility: HOSPITAL | Age: 71
End: 2021-10-27

## 2021-12-09 ENCOUNTER — OFFICE VISIT (OUTPATIENT)
Dept: ORTHOPEDIC SURGERY | Facility: CLINIC | Age: 71
End: 2021-12-09

## 2021-12-09 ENCOUNTER — LAB (OUTPATIENT)
Dept: LAB | Facility: HOSPITAL | Age: 71
End: 2021-12-09

## 2021-12-09 VITALS — WEIGHT: 224.4 LBS | HEIGHT: 69 IN | BODY MASS INDEX: 33.24 KG/M2

## 2021-12-09 DIAGNOSIS — E78.00 PURE HYPERCHOLESTEROLEMIA: Chronic | ICD-10-CM

## 2021-12-09 DIAGNOSIS — N40.1 BENIGN PROSTATIC HYPERPLASIA WITH INCOMPLETE BLADDER EMPTYING: Chronic | ICD-10-CM

## 2021-12-09 DIAGNOSIS — I10 ESSENTIAL HYPERTENSION: Chronic | ICD-10-CM

## 2021-12-09 DIAGNOSIS — M12.812 ROTATOR CUFF ARTHROPATHY, LEFT: ICD-10-CM

## 2021-12-09 DIAGNOSIS — M75.102 ROTATOR CUFF SYNDROME, LEFT: ICD-10-CM

## 2021-12-09 DIAGNOSIS — M25.512 ACUTE PAIN OF LEFT SHOULDER: Primary | ICD-10-CM

## 2021-12-09 DIAGNOSIS — R39.14 BENIGN PROSTATIC HYPERPLASIA WITH INCOMPLETE BLADDER EMPTYING: Chronic | ICD-10-CM

## 2021-12-09 LAB
ALBUMIN SERPL-MCNC: 4.5 G/DL (ref 3.5–5.2)
ALBUMIN/GLOB SERPL: 1.7 G/DL
ALP SERPL-CCNC: 60 U/L (ref 39–117)
ALT SERPL W P-5'-P-CCNC: 30 U/L (ref 1–41)
ANION GAP SERPL CALCULATED.3IONS-SCNC: 10 MMOL/L (ref 5–15)
AST SERPL-CCNC: 19 U/L (ref 1–40)
BILIRUB SERPL-MCNC: 0.3 MG/DL (ref 0–1.2)
BUN SERPL-MCNC: 18 MG/DL (ref 8–23)
BUN/CREAT SERPL: 16.8 (ref 7–25)
CALCIUM SPEC-SCNC: 9.8 MG/DL (ref 8.6–10.5)
CHLORIDE SERPL-SCNC: 99 MMOL/L (ref 98–107)
CHOLEST SERPL-MCNC: 162 MG/DL (ref 0–200)
CO2 SERPL-SCNC: 26 MMOL/L (ref 22–29)
CREAT SERPL-MCNC: 1.07 MG/DL (ref 0.76–1.27)
GFR SERPL CREATININE-BSD FRML MDRD: 68 ML/MIN/1.73
GLOBULIN UR ELPH-MCNC: 2.6 GM/DL
GLUCOSE SERPL-MCNC: 114 MG/DL (ref 65–99)
HDLC SERPL-MCNC: 49 MG/DL (ref 40–60)
LDLC SERPL CALC-MCNC: 60 MG/DL (ref 0–100)
LDLC/HDLC SERPL: 0.9 {RATIO}
MAGNESIUM SERPL-MCNC: 2.2 MG/DL (ref 1.6–2.4)
POTASSIUM SERPL-SCNC: 4.2 MMOL/L (ref 3.5–5.2)
PROT SERPL-MCNC: 7.1 G/DL (ref 6–8.5)
PSA SERPL-MCNC: 0.88 NG/ML (ref 0–4)
SODIUM SERPL-SCNC: 135 MMOL/L (ref 136–145)
TRIGL SERPL-MCNC: 344 MG/DL (ref 0–150)
VLDLC SERPL-MCNC: 53 MG/DL (ref 5–40)

## 2021-12-09 PROCEDURE — 80053 COMPREHEN METABOLIC PANEL: CPT

## 2021-12-09 PROCEDURE — 83735 ASSAY OF MAGNESIUM: CPT

## 2021-12-09 PROCEDURE — 20610 DRAIN/INJ JOINT/BURSA W/O US: CPT | Performed by: NURSE PRACTITIONER

## 2021-12-09 PROCEDURE — 80061 LIPID PANEL: CPT

## 2021-12-09 PROCEDURE — 36415 COLL VENOUS BLD VENIPUNCTURE: CPT

## 2021-12-09 PROCEDURE — 84153 ASSAY OF PSA TOTAL: CPT

## 2021-12-09 RX ORDER — LIDOCAINE HYDROCHLORIDE 10 MG/ML
2 INJECTION, SOLUTION INFILTRATION; PERINEURAL
Status: COMPLETED | OUTPATIENT
Start: 2021-12-09 | End: 2021-12-09

## 2021-12-09 RX ORDER — TRIAMCINOLONE ACETONIDE 40 MG/ML
40 INJECTION, SUSPENSION INTRA-ARTICULAR; INTRAMUSCULAR
Status: COMPLETED | OUTPATIENT
Start: 2021-12-09 | End: 2021-12-09

## 2021-12-09 RX ADMIN — TRIAMCINOLONE ACETONIDE 40 MG: 40 INJECTION, SUSPENSION INTRA-ARTICULAR; INTRAMUSCULAR at 11:10

## 2021-12-09 RX ADMIN — LIDOCAINE HYDROCHLORIDE 2 ML: 10 INJECTION, SOLUTION INFILTRATION; PERINEURAL at 11:10

## 2021-12-09 NOTE — PROGRESS NOTES
"Kamlesh Moser is a 71 y.o. male returns for     Chief Complaint   Patient presents with   • Left Shoulder - Follow-up       HISTORY OF PRESENT ILLNESS: The patient is here for a shoulder injection.  There are no unusual complaints.  Patient reports injection usually helps relieve his pain and that is why he is here.       CONCURRENT MEDICAL HISTORY:    The following portions of the patient's history were reviewed and updated as appropriate: allergies, current medications, past family history, past medical history, past social history, past surgical history and problem list.     ROS  No fevers or chills.  No chest pain or shortness of air.  No GI or  disturbances.    PHYSICAL EXAMINATION:       Ht 175.3 cm (69\")   Wt 102 kg (224 lb 6.4 oz)   BMI 33.14 kg/m²     Physical Exam    GAIT:     []  Normal  [x]  Antalgic    Assistive device: [x]  None  []  Walker     []  Crutches  []  Cane     []  Wheelchair  []  Stretcher    Left Shoulder Exam     Tenderness   The patient is experiencing no tenderness.     Muscle Strength   Abduction: 4/5   Internal rotation: 4/5     Other   Erythema: absent  Scars: absent  Sensation: normal               No results found.    EXAM: MR SHOULDER WITHOUT IV CONTRAST     COMPARISONS: Shoulder radiograph 8/24/2021     INDICATION: pain, M25.512 Pain in left shoulder M75.102  Unspecified rotator cuff tear or rupture of left shoulder, not  specified as traumatic     TECHNIQUE: Multiplanar, multisequence MR images were obtained of  the left shoulder without the use of intravenous contrast.     FINDINGS:     Acromioclavicular Joint and Coracoclavicular Interval: Mild  degenerative changes of the acromioclavicular joint. There is  narrowing of the acromiohumeral interval to 5 mm.  Acromioclavicular and coracoclavicular ligaments are intact. No  subacromial enthesophyte. Fluid is seen within the subdeltoid  bursa.     Rotator Cuff:  Full-thickness tear of the supraspinatus with " tendinous  retraction of at least 4.1 cm to the level of the  acromioclavicular joint. There is fatty atrophy of the  supraspinatus muscle belly.      Small insertional tear of the infraspinatus measuring 3 mm in AP  dimension. There is mild fatty atrophy of the infraspinatus  muscle belly.     Teres minor is intact with mild fatty atrophy.      Full-thickness partial tear of the subscapularis middle and  superior fibers with tendinous retraction. A few of the inferior  fibers remain intact. There is buckling of the retracted tendon  to the level of at least the glenohumeral joint.      Biceps Tendon:  Biceps tendon is not visualized within the bicipital groove and  likely torn with distal retraction.     Glenoid:  Essentially circumferential tear of the labrum, most significant  at the anterior superior quadrant. No acute osseous glenoid  abnormality. Glenohumeral ligaments are intact.     Osseous Structures and Cartilage:  Mild proximal humeral marrow reconversion. Loss of the  glenohumeral articular cartilage. There are osteophytes of the  inferior humeral head. There are subcortical sclerotic and cystic  degenerative changes of the anterior and posterior humeral head.      Miscellaneous:  There is a small joint effusion. No mass. Visualized portions of  the lung are unremarkable.     IMPRESSION:  Circumferential labral tearing, most significant in the anterior  superior quadrant.     Full-thickness tear of the supraspinatus with tendinous  retraction and muscular atrophy.     Full-thickness, partial tear of the subscapularis superior fibers  (upper 50%) with tendinous retraction.     Infraspinatus insertional tears with infraspinatus muscular  atrophy.     Teres minor remains intact with mild fatty atrophy.     Tear of the biceps tendon with likely distal retraction.     Left glenohumeral osteoarthritis and chondromalacia with high  riding humeral head consistent with chronic rotator  cuff  insufficiency.     Joint effusion and subdeltoid bursitis.     Acromioclavicular osteoarthritis.     Electronically signed by:  Kentrell Marquez MD  9/2/2021  2:34 PM CDT Workstation: [id number]          Procedure:  XR SHOULDER 2+ VW LEFT  Exam Date:  8/24/21     COMPARISON:  Not applicable, no relevant images available.     IMPRESSION: 3 views of the left shoulder show acceptable position and alignment with no evidence of acute bony abnormality.  No fracture or dislocation is noted.  Mild to moderate arthritic change noted in the AC joint.  No acute findings.  The internal rotation view shows mild proximal subluxation of the humeral head.  MRI would be warranted to assess for rotator cuff pathology.        Elias Stokes MD  8/24/21           ASSESSMENT:        Diagnoses and all orders for this visit:    Acute pain of left shoulder    Rotator cuff arthropathy, left    Rotator cuff syndrome, left    Other orders  -     Large Joint Arthrocentesis: L subacromial bursa      PLAN:    Large Joint Arthrocentesis: L subacromial bursa  Date/Time: 12/9/2021 11:10 AM  Consent given by: patient  Site marked: site marked  Timeout: Immediately prior to procedure a time out was called to verify the correct patient, procedure, equipment, support staff and site/side marked as required   Supporting Documentation  Indications: pain   Procedure Details  Location: shoulder - L subacromial bursa  Preparation: Patient was prepped and draped in the usual sterile fashion  Needle size: 22 G  Approach: posterior  Medications administered: 40 mg triamcinolone acetonide 40 MG/ML; 2 mL lidocaine 1 %  Patient tolerance: patient tolerated the procedure well with no immediate complications            Slowly increase activity as tolerated.  Discussed importance of leg strengthening and general conditioning.  Discussed warning signs of injection.  Discussed that purpose of injections was symptom improvement and improved activity.   Also discussed that further treatment options depended on symptoms at followup and length of time of improvement after injections.    Return if symptoms worsen or fail to improve, for recheck.  All questions and concerns are addressed with understanding noted. They are aware and are in agreement to this plan.    Return if symptoms worsen or fail to improve.    CHELY Alves    This document has been electronically signed by CHELY Alves on December 9, 2021 11:53 CST      EMR Dragon/Transciption Disclaimer: Some of this note may be an electronic transcription/translation of spoken language to printed text.  The electronic translation of spoken language may permit erroneous, or at times, nonsensical words or phrases to be inadvertently transcribed. Although I have reviewed the note for such errors, some may still exist.

## 2021-12-14 ENCOUNTER — OFFICE VISIT (OUTPATIENT)
Dept: FAMILY MEDICINE CLINIC | Facility: CLINIC | Age: 71
End: 2021-12-14

## 2021-12-14 VITALS
SYSTOLIC BLOOD PRESSURE: 136 MMHG | WEIGHT: 215.4 LBS | BODY MASS INDEX: 31.9 KG/M2 | DIASTOLIC BLOOD PRESSURE: 78 MMHG | HEIGHT: 69 IN

## 2021-12-14 DIAGNOSIS — I10 PRIMARY HYPERTENSION: Primary | Chronic | ICD-10-CM

## 2021-12-14 DIAGNOSIS — K21.9 GASTROESOPHAGEAL REFLUX DISEASE WITHOUT ESOPHAGITIS: Chronic | ICD-10-CM

## 2021-12-14 DIAGNOSIS — N40.1 BENIGN PROSTATIC HYPERPLASIA WITH INCOMPLETE BLADDER EMPTYING: Chronic | ICD-10-CM

## 2021-12-14 DIAGNOSIS — E78.00 PURE HYPERCHOLESTEROLEMIA: Chronic | ICD-10-CM

## 2021-12-14 DIAGNOSIS — E66.09 CLASS 1 OBESITY DUE TO EXCESS CALORIES WITH SERIOUS COMORBIDITY AND BODY MASS INDEX (BMI) OF 31.0 TO 31.9 IN ADULT: ICD-10-CM

## 2021-12-14 DIAGNOSIS — R39.14 BENIGN PROSTATIC HYPERPLASIA WITH INCOMPLETE BLADDER EMPTYING: Chronic | ICD-10-CM

## 2021-12-14 DIAGNOSIS — J60 BLACK LUNG (HCC): ICD-10-CM

## 2021-12-14 DIAGNOSIS — J30.89 SEASONAL ALLERGIC RHINITIS DUE TO OTHER ALLERGIC TRIGGER: ICD-10-CM

## 2021-12-14 DIAGNOSIS — I10 ESSENTIAL HYPERTENSION: Chronic | ICD-10-CM

## 2021-12-14 PROBLEM — M25.512 ACUTE PAIN OF LEFT SHOULDER: Status: RESOLVED | Noted: 2021-08-24 | Resolved: 2021-12-14

## 2021-12-14 PROBLEM — M25.562 ACUTE PAIN OF BOTH KNEES: Status: RESOLVED | Noted: 2021-10-14 | Resolved: 2021-12-14

## 2021-12-14 PROBLEM — M25.561 ACUTE PAIN OF BOTH KNEES: Status: RESOLVED | Noted: 2021-10-14 | Resolved: 2021-12-14

## 2021-12-14 PROCEDURE — 99214 OFFICE O/P EST MOD 30 MIN: CPT | Performed by: FAMILY MEDICINE

## 2021-12-14 RX ORDER — PREDNISONE 20 MG/1
TABLET ORAL
Qty: 10 TABLET | Refills: 0 | Status: SHIPPED | OUTPATIENT
Start: 2021-12-14 | End: 2022-01-27

## 2021-12-14 RX ORDER — TRIAMTERENE AND HYDROCHLOROTHIAZIDE 37.5; 25 MG/1; MG/1
1 TABLET ORAL DAILY
Qty: 90 TABLET | Refills: 3 | Status: SHIPPED | OUTPATIENT
Start: 2021-12-14 | End: 2021-12-20

## 2021-12-14 RX ORDER — FLUTICASONE PROPIONATE 50 MCG
2 SPRAY, SUSPENSION (ML) NASAL DAILY
Qty: 48 ML | Refills: 3 | Status: SHIPPED | OUTPATIENT
Start: 2021-12-14 | End: 2022-08-24 | Stop reason: ALTCHOICE

## 2021-12-14 RX ORDER — MONTELUKAST SODIUM 10 MG/1
10 TABLET ORAL
Qty: 90 TABLET | Refills: 3 | Status: SHIPPED | OUTPATIENT
Start: 2021-12-14 | End: 2022-12-13

## 2021-12-14 RX ORDER — OMEPRAZOLE 20 MG/1
20 CAPSULE, DELAYED RELEASE ORAL DAILY
Qty: 90 CAPSULE | Refills: 1 | Status: SHIPPED | OUTPATIENT
Start: 2021-12-14 | End: 2022-12-13

## 2021-12-14 RX ORDER — FINASTERIDE 5 MG/1
5 TABLET, FILM COATED ORAL DAILY
Qty: 90 TABLET | Refills: 3 | Status: SHIPPED | OUTPATIENT
Start: 2021-12-14 | End: 2022-12-13

## 2021-12-14 RX ORDER — PRAVASTATIN SODIUM 40 MG
40 TABLET ORAL DAILY
Qty: 90 TABLET | Refills: 3 | Status: SHIPPED | OUTPATIENT
Start: 2021-12-14 | End: 2022-12-14 | Stop reason: SDUPTHER

## 2021-12-14 RX ORDER — TAMSULOSIN HYDROCHLORIDE 0.4 MG/1
1 CAPSULE ORAL DAILY
Qty: 90 CAPSULE | Refills: 3 | Status: SHIPPED | OUTPATIENT
Start: 2021-12-14 | End: 2021-12-23 | Stop reason: SDUPTHER

## 2021-12-14 NOTE — PROGRESS NOTES
Subjective   Kamlesh Moser is a 71 y.o. male.  Reevaluation hypertension hyperlipidemia distant history of last week long allergic rhinitis orthopedic issues obesity in the interim is developed mild postnasal drip and congestion mainly environmental not using no spray every day counseled on same.  No fever no chills.  Has not had a flu vaccine will get one soon arthralgias remained about the same    History of Present Illness   HPI    The following portions of the patient's history were reviewed and updated as appropriate: allergies, current medications, past family history, past medical history, past social history, past surgical history and problem list.    Review of Systems  Review of Systems   Constitutional: Negative for activity change, appetite change, fatigue and unexpected weight change.   HENT: Positive for postnasal drip, rhinorrhea and sinus pressure. Negative for trouble swallowing and voice change.    Eyes: Negative for redness and visual disturbance.   Respiratory: Negative for cough and wheezing.    Cardiovascular: Negative for chest pain and palpitations.   Gastrointestinal: Negative for abdominal pain, constipation, diarrhea, nausea and vomiting.   Genitourinary: Negative for urgency.   Musculoskeletal: Positive for arthralgias. Negative for joint swelling.   Neurological: Negative for syncope and headaches.   Hematological: Negative for adenopathy.   Psychiatric/Behavioral: Negative for sleep disturbance.       Objective   Physical Exam  Physical Exam  Constitutional:       Appearance: He is well-developed.   HENT:      Head: Normocephalic.   Eyes:      Pupils: Pupils are equal, round, and reactive to light.   Neck:      Thyroid: No thyromegaly.   Cardiovascular:      Rate and Rhythm: Normal rate and regular rhythm.      Heart sounds: Normal heart sounds. No murmur heard.  No friction rub. No gallop.    Pulmonary:      Breath sounds: Normal breath sounds.   Abdominal:      General: There is  no distension.      Palpations: Abdomen is soft. There is no mass.      Tenderness: There is no abdominal tenderness.   Musculoskeletal:         General: Normal range of motion.      Cervical back: Normal range of motion.      Comments: No edema get up and go 3 to 5 seconds gait normal   Skin:     General: Skin is warm and dry.   Neurological:      Mental Status: He is alert and oriented to person, place, and time.      Deep Tendon Reflexes: Reflexes are normal and symmetric.   Psychiatric:         Attention and Perception: Attention normal.         Mood and Affect: Mood normal.         Speech: Speech normal.         Behavior: Behavior normal.         Thought Content: Thought content normal.         Cognition and Memory: Cognition normal.      Comments: Mild hard of hearing       Results for orders placed or performed in visit on 12/09/21   PSA DIAGNOSTIC    Specimen: Blood   Result Value Ref Range    PSA 0.879 0.000 - 4.000 ng/mL   Lipid Panel With LDL / HDL Ratio    Specimen: Blood   Result Value Ref Range    Total Cholesterol 162 0 - 200 mg/dL    Triglycerides 344 (H) 0 - 150 mg/dL    HDL Cholesterol 49 40 - 60 mg/dL    LDL Cholesterol  60 0 - 100 mg/dL    VLDL Cholesterol 53 (H) 5 - 40 mg/dL    LDL/HDL Ratio 0.90    Magnesium    Specimen: Blood   Result Value Ref Range    Magnesium 2.2 1.6 - 2.4 mg/dL   Comprehensive Metabolic Panel    Specimen: Blood   Result Value Ref Range    Glucose 114 (H) 65 - 99 mg/dL    BUN 18 8 - 23 mg/dL    Creatinine 1.07 0.76 - 1.27 mg/dL    Sodium 135 (L) 136 - 145 mmol/L    Potassium 4.2 3.5 - 5.2 mmol/L    Chloride 99 98 - 107 mmol/L    CO2 26.0 22.0 - 29.0 mmol/L    Calcium 9.8 8.6 - 10.5 mg/dL    Total Protein 7.1 6.0 - 8.5 g/dL    Albumin 4.50 3.50 - 5.20 g/dL    ALT (SGPT) 30 1 - 41 U/L    AST (SGOT) 19 1 - 40 U/L    Alkaline Phosphatase 60 39 - 117 U/L    Total Bilirubin 0.3 0.0 - 1.2 mg/dL    eGFR Non African Amer 68 >60 mL/min/1.73    Globulin 2.6 gm/dL    A/G Ratio 1.7  "g/dL    BUN/Creatinine Ratio 16.8 7.0 - 25.0    Anion Gap 10.0 5.0 - 15.0 mmol/L         Visit Vitals  /78   Ht 175.3 cm (69\")   Wt 97.7 kg (215 lb 6.4 oz)   BMI 31.81 kg/m²     Body mass index is 31.81 kg/m².  /78   Ht 175.3 cm (69\")   Wt 97.7 kg (215 lb 6.4 oz)   BMI 31.81 kg/m²       Assessment/Plan   Diagnoses and all orders for this visit:    1. Primary hypertension (Primary)    2. Pure hypercholesterolemia    3. Class 1 obesity due to excess calories with serious comorbidity and body mass index (BMI) of 31.0 to 31.9 in adult    4. Benign prostatic hyperplasia with incomplete bladder emptying  -     finasteride (PROSCAR) 5 MG tablet; Take 1 tablet by mouth Daily.  Dispense: 90 tablet; Refill: 3  -     pravastatin (PRAVACHOL) 40 MG tablet; Take 1 tablet by mouth Daily.  Dispense: 90 tablet; Refill: 3  -     tamsulosin (FLOMAX) 0.4 MG capsule 24 hr capsule; Take 1 capsule by mouth Daily.  Dispense: 90 capsule; Refill: 3    5. Gastroesophageal reflux disease without esophagitis  -     omeprazole (priLOSEC) 20 MG capsule; Take 1 capsule by mouth Daily. Prn reflux  Dispense: 90 capsule; Refill: 1    6. Essential hypertension  -     triamterene-hydrochlorothiazide (MAXZIDE-25) 37.5-25 MG per tablet; Take 1 tablet by mouth Daily. for blood pressure  Dispense: 90 tablet; Refill: 3    7. Seasonal allergic rhinitis due to other allergic trigger  -     predniSONE (DELTASONE) 20 MG tablet; 1 twice daily x5 days for congestion  Dispense: 10 tablet; Refill: 0  -     fluticasone (Flonase) 50 MCG/ACT nasal spray; 2 sprays into the nostril(s) as directed by provider Daily.  Dispense: 48 mL; Refill: 3    8. Black lung (HCC)  -     predniSONE (DELTASONE) 20 MG tablet; 1 twice daily x5 days for congestion  Dispense: 10 tablet; Refill: 0    Other orders  -     montelukast (SINGULAIR) 10 MG tablet; Take 1 tablet by mouth every night at bedtime.  Dispense: 90 tablet; Refill: 3     on wt loss measures and " programs  Also strongly lifestyle measures hydration salt restriction immunizations.  Counseled getting Edgemont Miller back can use using Flonase every day short-term medication.  Recheck 6 months be time for subsequent Medicare

## 2021-12-20 DIAGNOSIS — I10 ESSENTIAL HYPERTENSION: Chronic | ICD-10-CM

## 2021-12-20 RX ORDER — TRIAMTERENE AND HYDROCHLOROTHIAZIDE 37.5; 25 MG/1; MG/1
TABLET ORAL
Qty: 90 TABLET | Refills: 3 | Status: SHIPPED | OUTPATIENT
Start: 2021-12-20 | End: 2022-12-13

## 2021-12-23 DIAGNOSIS — R39.14 BENIGN PROSTATIC HYPERPLASIA WITH INCOMPLETE BLADDER EMPTYING: Chronic | ICD-10-CM

## 2021-12-23 DIAGNOSIS — N40.1 BENIGN PROSTATIC HYPERPLASIA WITH INCOMPLETE BLADDER EMPTYING: Chronic | ICD-10-CM

## 2021-12-23 RX ORDER — TAMSULOSIN HYDROCHLORIDE 0.4 MG/1
1 CAPSULE ORAL DAILY
Qty: 90 CAPSULE | Refills: 3 | Status: SHIPPED | OUTPATIENT
Start: 2021-12-23 | End: 2022-02-10 | Stop reason: SDUPTHER

## 2021-12-23 NOTE — TELEPHONE ENCOUNTER
Incoming Refill Request      Medication requested (name and dose): tamsulosin 0.4 mg    Pharmacy where request should be sent: [facility]    Additional details provided by patient: 90day supply    Best call back number: [phone]    Does the patient have less than a 3 day supply:  [x] Yes  [x] No    Joe Fuchs Rep  12/23/21, 12:44 CST

## 2022-01-27 ENCOUNTER — LAB (OUTPATIENT)
Dept: LAB | Facility: HOSPITAL | Age: 72
End: 2022-01-27

## 2022-01-27 ENCOUNTER — OFFICE VISIT (OUTPATIENT)
Dept: FAMILY MEDICINE CLINIC | Facility: CLINIC | Age: 72
End: 2022-01-27

## 2022-01-27 VITALS
BODY MASS INDEX: 32.51 KG/M2 | DIASTOLIC BLOOD PRESSURE: 80 MMHG | WEIGHT: 219.5 LBS | HEIGHT: 69 IN | SYSTOLIC BLOOD PRESSURE: 172 MMHG

## 2022-01-27 DIAGNOSIS — I10 PRIMARY HYPERTENSION: Chronic | ICD-10-CM

## 2022-01-27 DIAGNOSIS — N50.812 TESTICULAR PAIN, LEFT: ICD-10-CM

## 2022-01-27 DIAGNOSIS — R39.11 URINARY HESITANCY: Primary | ICD-10-CM

## 2022-01-27 DIAGNOSIS — N40.1 BENIGN PROSTATIC HYPERPLASIA WITH INCOMPLETE BLADDER EMPTYING: ICD-10-CM

## 2022-01-27 DIAGNOSIS — R39.14 BENIGN PROSTATIC HYPERPLASIA WITH INCOMPLETE BLADDER EMPTYING: ICD-10-CM

## 2022-01-27 DIAGNOSIS — R30.0 DYSURIA: ICD-10-CM

## 2022-01-27 LAB
BACTERIA UR QL AUTO: ABNORMAL /HPF
BILIRUB UR QL STRIP: NEGATIVE
CLARITY UR: ABNORMAL
COLOR UR: YELLOW
GLUCOSE UR STRIP-MCNC: NEGATIVE MG/DL
HGB UR QL STRIP.AUTO: ABNORMAL
HYALINE CASTS UR QL AUTO: ABNORMAL /LPF
KETONES UR QL STRIP: NEGATIVE
LEUKOCYTE ESTERASE UR QL STRIP.AUTO: ABNORMAL
NITRITE UR QL STRIP: NEGATIVE
PH UR STRIP.AUTO: 8.5 [PH] (ref 5–9)
PROT UR QL STRIP: ABNORMAL
RBC # UR STRIP: ABNORMAL /HPF
REF LAB TEST METHOD: ABNORMAL
SP GR UR STRIP: 1.02 (ref 1–1.03)
SQUAMOUS #/AREA URNS HPF: ABNORMAL /HPF
UROBILINOGEN UR QL STRIP: ABNORMAL
WBC # UR STRIP: ABNORMAL /HPF

## 2022-01-27 PROCEDURE — 87086 URINE CULTURE/COLONY COUNT: CPT | Performed by: FAMILY MEDICINE

## 2022-01-27 PROCEDURE — 81001 URINALYSIS AUTO W/SCOPE: CPT | Performed by: FAMILY MEDICINE

## 2022-01-27 PROCEDURE — 87186 SC STD MICRODIL/AGAR DIL: CPT | Performed by: FAMILY MEDICINE

## 2022-01-27 PROCEDURE — 99213 OFFICE O/P EST LOW 20 MIN: CPT | Performed by: FAMILY MEDICINE

## 2022-01-27 PROCEDURE — 87077 CULTURE AEROBIC IDENTIFY: CPT | Performed by: FAMILY MEDICINE

## 2022-01-27 RX ORDER — CIPROFLOXACIN 500 MG/1
500 TABLET, FILM COATED ORAL 2 TIMES DAILY
Qty: 20 TABLET | Refills: 0 | Status: SHIPPED | OUTPATIENT
Start: 2022-01-27 | End: 2022-02-10

## 2022-01-27 RX ORDER — PHENAZOPYRIDINE HYDROCHLORIDE 100 MG/1
100 TABLET, FILM COATED ORAL 3 TIMES DAILY PRN
Qty: 15 TABLET | Refills: 1 | Status: SHIPPED | OUTPATIENT
Start: 2022-01-27 | End: 2022-02-10

## 2022-01-27 NOTE — PROGRESS NOTES
Subjective   Kamlesh Moser is a 71 y.o. male. Evaluation of 3 to 5-day history of urinary hesitancy dysuria lower abdominal pain. Questionable fever but not documented. No nausea no vomiting. States worsening of incomplete bladder emptying. Is already on Avodart and Flomax. PSA done month ago was below 1. Last creatinine 1. Now has developed mild left testicular pain over the past 12 hours but not as bad as previous 12 hours. History noted.    History of Present Illness   HPI    The following portions of the patient's history were reviewed and updated as appropriate: allergies, current medications, past family history, past medical history, past social history, past surgical history and problem list.    Review of Systems  Review of Systems   Constitutional: Negative for activity change, appetite change, fatigue and unexpected weight change.   HENT: Negative for trouble swallowing and voice change.    Eyes: Negative for redness and visual disturbance.   Respiratory: Negative for cough and wheezing.    Cardiovascular: Negative for chest pain and palpitations.   Gastrointestinal: Negative for abdominal pain, constipation, diarrhea, nausea and vomiting.   Genitourinary: Positive for difficulty urinating, dysuria and testicular pain. Negative for urgency.   Musculoskeletal: Negative for joint swelling.   Neurological: Negative for syncope and headaches.   Hematological: Negative for adenopathy.   Psychiatric/Behavioral: Negative for sleep disturbance.       Objective   Physical Exam  Physical Exam  Constitutional:       Appearance: Normal appearance.   Genitourinary:     Testes:         Left: Tenderness present.      Comments: No hernia noted left posterior pole of the testicle is tender to palpation but no mass-effect. No inguinal hernia. No rash seen.  Neurological:      Mental Status: He is alert.   Psychiatric:         Mood and Affect: Mood normal.         Speech: Speech normal.         Behavior: Behavior is  "cooperative.           Visit Vitals  /80   Ht 175.3 cm (69\")   Wt 99.6 kg (219 lb 8 oz)   BMI 32.41 kg/m²     Body mass index is 32.41 kg/m².  /80   Ht 175.3 cm (69\")   Wt 99.6 kg (219 lb 8 oz)   BMI 32.41 kg/m²       Assessment/Plan   Diagnoses and all orders for this visit:    1. Urinary hesitancy (Primary)  -     Urinalysis With Microscopic - Urine, Clean Catch  -     Urine Culture - Urine, Urine, Clean Catch  -     phenazopyridine (Pyridium) 100 MG tablet; Take 1 tablet by mouth 3 (Three) Times a Day As Needed for Bladder Spasms. And pain  Dispense: 15 tablet; Refill: 1  -     ciprofloxacin (Cipro) 500 MG tablet; Take 1 tablet by mouth 2 (Two) Times a Day. Till gone for infection  Dispense: 20 tablet; Refill: 0    2. Dysuria  -     Urinalysis With Microscopic - Urine, Clean Catch  -     Urine Culture - Urine, Urine, Clean Catch  -     phenazopyridine (Pyridium) 100 MG tablet; Take 1 tablet by mouth 3 (Three) Times a Day As Needed for Bladder Spasms. And pain  Dispense: 15 tablet; Refill: 1  -     ciprofloxacin (Cipro) 500 MG tablet; Take 1 tablet by mouth 2 (Two) Times a Day. Till gone for infection  Dispense: 20 tablet; Refill: 0    3. Benign prostatic hyperplasia with incomplete bladder emptying  -     Urinalysis With Microscopic - Urine, Clean Catch  -     Urine Culture - Urine, Urine, Clean Catch    4. Testicular pain, left  -     Urinalysis With Microscopic - Urine, Clean Catch  -     Urine Culture - Urine, Urine, Clean Catch  -     ciprofloxacin (Cipro) 500 MG tablet; Take 1 tablet by mouth 2 (Two) Times a Day. Till gone for infection  Dispense: 20 tablet; Refill: 0    5. Primary hypertension    Blood pressure transient elevated secondary to current condition most likely. Counseled on probable UTI versus prostatitis must possible early epididymitis. Unfortunate patient is already urinated prior to being seen. We will start off with urinalysis. Presumptive treatment of infection counseled " increasing hydration continue medicines as outlined for now will contact by phone later on the morning for further orders

## 2022-01-29 LAB — BACTERIA SPEC AEROBE CULT: ABNORMAL

## 2022-02-10 ENCOUNTER — OFFICE VISIT (OUTPATIENT)
Dept: FAMILY MEDICINE CLINIC | Facility: CLINIC | Age: 72
End: 2022-02-10

## 2022-02-10 VITALS
BODY MASS INDEX: 32.44 KG/M2 | SYSTOLIC BLOOD PRESSURE: 132 MMHG | DIASTOLIC BLOOD PRESSURE: 80 MMHG | WEIGHT: 219 LBS | HEIGHT: 69 IN

## 2022-02-10 DIAGNOSIS — R39.14 BENIGN PROSTATIC HYPERPLASIA WITH INCOMPLETE BLADDER EMPTYING: Primary | Chronic | ICD-10-CM

## 2022-02-10 DIAGNOSIS — N30.00 ACUTE CYSTITIS WITHOUT HEMATURIA: ICD-10-CM

## 2022-02-10 DIAGNOSIS — N40.1 BENIGN PROSTATIC HYPERPLASIA WITH INCOMPLETE BLADDER EMPTYING: Primary | Chronic | ICD-10-CM

## 2022-02-10 PROBLEM — J60 BLACK LUNG: Chronic | Status: RESOLVED | Noted: 2021-12-14 | Resolved: 2022-02-10

## 2022-02-10 PROCEDURE — 99212 OFFICE O/P EST SF 10 MIN: CPT | Performed by: FAMILY MEDICINE

## 2022-02-10 RX ORDER — TAMSULOSIN HYDROCHLORIDE 0.4 MG/1
CAPSULE ORAL
Qty: 120 CAPSULE | Refills: 3 | Status: SHIPPED | OUTPATIENT
Start: 2022-02-10 | End: 2022-11-04

## 2022-02-10 NOTE — PROGRESS NOTES
"Subjective   Kamlesh Moser is a 71 y.o. male.  Reevaluation urinary tract infection urine did grow E. coli sensitive to all.  States about 3 to 4 days ago started evolving symptom relief no no hematuria no back pain no dysuria no increased frequency.  Still having some occasional nocturia but overall improved.  PSA done in December was less than 1.    History of Present Illness   HPI    The following portions of the patient's history were reviewed and updated as appropriate: allergies, current medications, past family history, past medical history, past social history, past surgical history and problem list.    Review of Systems  Review of Systems   Constitutional: Negative for activity change, appetite change, fatigue and unexpected weight change.   HENT: Negative for trouble swallowing and voice change.    Eyes: Negative for redness and visual disturbance.   Respiratory: Negative for cough and wheezing.    Cardiovascular: Negative for chest pain and palpitations.   Gastrointestinal: Negative for abdominal pain, constipation, diarrhea, nausea and vomiting.   Genitourinary: Negative for urgency.   Musculoskeletal: Negative for joint swelling.   Neurological: Negative for syncope and headaches.   Hematological: Negative for adenopathy.   Psychiatric/Behavioral: Negative for sleep disturbance.       Objective   Physical Exam  Physical Exam  Constitutional:       Appearance: Normal appearance. He is obese.   Neurological:      Mental Status: He is alert.   Psychiatric:         Mood and Affect: Mood normal.         Thought Content: Thought content normal.         Judgment: Judgment normal.           Visit Vitals  /80   Ht 175.3 cm (69\")   Wt 99.3 kg (219 lb)   BMI 32.34 kg/m²     Body mass index is 32.34 kg/m².  /80   Ht 175.3 cm (69\")   Wt 99.3 kg (219 lb)   BMI 32.34 kg/m²       Assessment/Plan   Diagnoses and all orders for this visit:    1. Benign prostatic hyperplasia with incomplete bladder " emptying (Primary)  -     tamsulosin (FLOMAX) 0.4 MG capsule 24 hr capsule; 1 to 2 capsules daily for prostate  Dispense: 120 capsule; Refill: 3    2. Acute cystitis without hematuria    Counseled on maintaining hydration and increasing Flomax to 1-2 daily as needed continue Proscar counseled on scheduled voiding.  Watch for recurrent problems.  Keep regular follow-up.  In June.

## 2022-04-28 ENCOUNTER — OFFICE VISIT (OUTPATIENT)
Dept: OTOLARYNGOLOGY | Facility: CLINIC | Age: 72
End: 2022-04-28

## 2022-04-28 VITALS — OXYGEN SATURATION: 99 % | BODY MASS INDEX: 33.18 KG/M2 | TEMPERATURE: 98.5 F | WEIGHT: 224 LBS | HEIGHT: 69 IN

## 2022-04-28 DIAGNOSIS — H60.63 CHRONIC NON-INFECTIVE OTITIS EXTERNA OF BOTH EARS, UNSPECIFIED TYPE: Primary | ICD-10-CM

## 2022-04-28 PROCEDURE — 99213 OFFICE O/P EST LOW 20 MIN: CPT | Performed by: OTOLARYNGOLOGY

## 2022-04-28 RX ORDER — FLUOCINOLONE ACETONIDE 0.1 MG/ML
SOLUTION TOPICAL
Qty: 60 ML | Refills: 1 | Status: SHIPPED | OUTPATIENT
Start: 2022-04-28

## 2022-04-28 NOTE — PROGRESS NOTES
Subjective   Kamlesh Moser is a 71 y.o. male.       History of Present Illness   Hearing aid wearer followed with chronic noninfective otitis externa.  Says he is not having any otorrhea but suspects his ears need cleaning      The following portions of the patient's history were reviewed and updated as appropriate: allergies, current medications, past family history, past medical history, past social history, past surgical history and problem list.     reports that he has quit smoking. He has never used smokeless tobacco. He reports current alcohol use. He reports that he does not use drugs.   Patient is not a tobacco user and has not been counseled for use of tobacco products      Review of Systems        Objective   Physical Exam  Both ear canals show quite a bit of uninfected squamous debris primarily medially in the canals.  This is all cleaned under the microscope using instrumentation.  Beyond this tympanic membranes are intact no infection or effusion      Assessment/Plan   Diagnoses and all orders for this visit:    1. Chronic non-infective otitis externa of both ears, unspecified type (Primary)        Plan: Ears cleaned as described above.  Return 9 months.  Call for problems.

## 2022-06-14 ENCOUNTER — OFFICE VISIT (OUTPATIENT)
Dept: FAMILY MEDICINE CLINIC | Facility: CLINIC | Age: 72
End: 2022-06-14

## 2022-06-14 VITALS
WEIGHT: 223.9 LBS | SYSTOLIC BLOOD PRESSURE: 130 MMHG | DIASTOLIC BLOOD PRESSURE: 76 MMHG | BODY MASS INDEX: 33.16 KG/M2 | HEIGHT: 69 IN

## 2022-06-14 DIAGNOSIS — E78.00 PURE HYPERCHOLESTEROLEMIA: Chronic | ICD-10-CM

## 2022-06-14 DIAGNOSIS — I10 PRIMARY HYPERTENSION: Primary | Chronic | ICD-10-CM

## 2022-06-14 DIAGNOSIS — E66.09 CLASS 1 OBESITY DUE TO EXCESS CALORIES WITH SERIOUS COMORBIDITY AND BODY MASS INDEX (BMI) OF 33.0 TO 33.9 IN ADULT: ICD-10-CM

## 2022-06-14 DIAGNOSIS — Z00.00 MEDICARE ANNUAL WELLNESS VISIT, SUBSEQUENT: ICD-10-CM

## 2022-06-14 DIAGNOSIS — R39.14 BENIGN PROSTATIC HYPERPLASIA WITH INCOMPLETE BLADDER EMPTYING: Chronic | ICD-10-CM

## 2022-06-14 DIAGNOSIS — Z12.11 SCREEN FOR COLON CANCER: ICD-10-CM

## 2022-06-14 DIAGNOSIS — N40.1 BENIGN PROSTATIC HYPERPLASIA WITH INCOMPLETE BLADDER EMPTYING: Chronic | ICD-10-CM

## 2022-06-14 PROCEDURE — G0439 PPPS, SUBSEQ VISIT: HCPCS | Performed by: FAMILY MEDICINE

## 2022-06-14 PROCEDURE — 99213 OFFICE O/P EST LOW 20 MIN: CPT | Performed by: FAMILY MEDICINE

## 2022-06-14 RX ORDER — MELOXICAM 15 MG/1
TABLET ORAL
Qty: 60 TABLET | Refills: 5 | Status: SHIPPED | OUTPATIENT
Start: 2022-06-14

## 2022-06-14 NOTE — PROGRESS NOTES
The ABCs of the Annual Wellness Visit  Subsequent Medicare Wellness Visit    Chief Complaint   Patient presents with   • Hypertension     6 month reval      Subjective    History of Present Illness:  Kamlesh Moser is a 71 y.o. male who presents for a Subsequent Medicare Wellness Visit.    The following portions of the patient's history were reviewed and   updated as appropriate: allergies, current medications, past family history, past medical history, past social history, past surgical history and problem list.    Compared to one year ago, the patient feels his physical   health is the same.    Compared to one year ago, the patient feels his mental   health is the same.    Recent Hospitalizations:  He was not admitted to the hospital during the last year.       Current Medical Providers:  Patient Care Team:  Blanco Drummond MD as PCP - General (Family Medicine)  Mack Yeager MD as Surgeon (Otolaryngology)    Outpatient Medications Prior to Visit   Medication Sig Dispense Refill   • finasteride (PROSCAR) 5 MG tablet Take 1 tablet by mouth Daily. 90 tablet 3   • fluocinolone (SYNALAR) 0.01 % external solution 3 drops each ear twice a day for 7 days as needed for itching 60 mL 1   • fluticasone (Flonase) 50 MCG/ACT nasal spray 2 sprays into the nostril(s) as directed by provider Daily. 48 mL 3   • montelukast (SINGULAIR) 10 MG tablet Take 1 tablet by mouth every night at bedtime. 90 tablet 3   • omeprazole (priLOSEC) 20 MG capsule Take 1 capsule by mouth Daily. Prn reflux 90 capsule 1   • pravastatin (PRAVACHOL) 40 MG tablet Take 1 tablet by mouth Daily. 90 tablet 3   • tamsulosin (FLOMAX) 0.4 MG capsule 24 hr capsule 1 to 2 capsules daily for prostate (Patient taking differently: 2 capsules. 1 to 2 capsules daily for prostate) 120 capsule 3   • triamterene-hydrochlorothiazide (MAXZIDE-25) 37.5-25 MG per tablet TAKE 1 TABLET DAILY FOR BLOOD PRESSURE. 90 tablet 3   • meloxicam (MOBIC) 15 MG tablet Use  "qd prn arth pain only 60 tablet 3     No facility-administered medications prior to visit.       No opioid medication identified on active medication list. I have reviewed chart for other potential  high risk medication/s and harmful drug interactions in the elderly.          Aspirin is not on active medication list.  Aspirin use is not indicated based on review of current medical condition/s. Risk of harm outweighs potential benefits.  .    Patient Active Problem List   Diagnosis   • Arthritis   • GERD (gastroesophageal reflux disease)   • Hyperlipidemia   • Hypertension   • Primary osteoarthritis of both knees   • Primary open angle glaucoma of left eye, mild stage   • Cataract   • Osteoarthritis of multiple joints   • Fatty liver   • Gallstones   • Benign prostatic hyperplasia with incomplete bladder emptying   • Class 1 obesity due to excess calories with serious comorbidity and body mass index (BMI) of 33.0 to 33.9 in adult     Advance Care Planning  Advance Directive is not on file.  ACP discussion was held with the patient during this visit. Patient does not have an advance directive, information provided.          Objective    Vitals:    06/14/22 0808   BP: 130/76   Weight: 102 kg (223 lb 14.4 oz)   Height: 175.3 cm (69\")   PainSc: 0-No pain     Estimated body mass index is 33.06 kg/m² as calculated from the following:    Height as of this encounter: 175.3 cm (69\").    Weight as of this encounter: 102 kg (223 lb 14.4 oz).    BMI is >= 30 and <35. (Class 1 Obesity). The following options were offered after discussion;: exercise counseling/recommendations and nutrition counseling/recommendations      Does the patient have evidence of cognitive impairment? No    Physical Exam            HEALTH RISK ASSESSMENT    Smoking Status:  Social History     Tobacco Use   Smoking Status Former Smoker   Smokeless Tobacco Never Used     Alcohol Consumption:  Social History     Substance and Sexual Activity   Alcohol Use Yes "    Comment: social     Fall Risk Screen:    RASHEEDADI Fall Risk Assessment was completed, and patient is at LOW risk for falls.Assessment completed on:1/27/2022    Depression Screening:  PHQ-2/PHQ-9 Depression Screening 1/27/2022   Retired PHQ-9 Total Score 0   Retired Total Score 0       Health Habits and Functional and Cognitive Screening:  Functional & Cognitive Status 7/1/2022   Do you have difficulty preparing food and eating? No   Do you have difficulty bathing yourself, getting dressed or grooming yourself? No   Do you have difficulty using the toilet? No   Do you have difficulty moving around from place to place? No   Do you have trouble with steps or getting out of a bed or a chair? No   Current Diet Well Balanced Diet   Dental Exam Unknown   Eye Exam Up to date   Exercise (times per week) 5 times per week   Current Exercises Include Gardening   Current Exercise Activities Include -   Do you need help using the phone?  No   Are you deaf or do you have serious difficulty hearing?  No   Do you need help with transportation? No   Do you need help shopping? No   Do you need help preparing meals?  No   Do you need help with housework?  No   Do you need help with laundry? No   Do you need help taking your medications? No   Do you need help managing money? No   Do you ever drive or ride in a car without wearing a seat belt? No   Have you felt unusual stress, anger or loneliness in the last month? No   Who do you live with? Spouse   If you need help, do you have trouble finding someone available to you? No   Have you been bothered in the last four weeks by sexual problems? No   Do you have difficulty concentrating, remembering or making decisions? No       Age-appropriate Screening Schedule:  Refer to the list below for future screening recommendations based on patient's age, sex and/or medical conditions. Orders for these recommended tests are listed in the plan section. The patient has been provided with a written  plan.    Health Maintenance   Topic Date Due   • INFLUENZA VACCINE  10/01/2022   • LIPID PANEL  12/09/2022   • TDAP/TD VACCINES (2 - Td or Tdap) 06/19/2024   • DIABETIC FOOT EXAM  Discontinued   • HEMOGLOBIN A1C  Discontinued   • DIABETIC EYE EXAM  Discontinued   • URINE MICROALBUMIN  Discontinued   • ZOSTER VACCINE  Discontinued              Assessment & Plan   CMS Preventative Services Quick Reference  Risk Factors Identified During Encounter  Immunizations Discussed/Encouraged (specific Immunizations; Influenza and COVID19  Obesity/Overweight   The above risks/problems have been discussed with the patient.  Follow up actions/plans if indicated are seen below in the Assessment/Plan Section.  Pertinent information has been shared with the patient in the After Visit Summary.    Diagnoses and all orders for this visit:    1. Primary hypertension (Primary)  -     Magnesium; Future  -     Comprehensive Metabolic Panel; Future    2. Pure hypercholesterolemia  -     Lipid Panel With LDL / HDL Ratio; Future    3. Class 1 obesity due to excess calories with serious comorbidity and body mass index (BMI) of 33.0 to 33.9 in adult    4. Benign prostatic hyperplasia with incomplete bladder emptying  -     PSA DIAGNOSTIC; Future    5. Screen for colon cancer  -     Ambulatory Referral to Gastroenterology    6. Medicare annual wellness visit, subsequent    Other orders  -     meloxicam (MOBIC) 15 MG tablet; Use qd prn arth pain only  Dispense: 60 tablet; Refill: 5        Follow Up:   Return in about 6 months (around 12/14/2022).     An After Visit Summary and PPPS were made available to the patient.

## 2022-06-14 NOTE — PROGRESS NOTES
Subjective   Kamlesh Moser is a 71 y.o. male.  Reevaluation of obesity hypertension BPH diagnoses below.  In the interim for she gained a little weight but still active farming every day try to stay hydrated medicines have remained the same.  Blood pressure medicines holding medicines for BPH holding.  Due for colonoscopy this year.  Lab work next time.  Counseled on COVID booster.    History of Present Illness   HPI    The following portions of the patient's history were reviewed and updated as appropriate: allergies, current medications, past family history, past medical history, past social history, past surgical history and problem list.    Review of Systems  Review of Systems   Constitutional: Negative for activity change, appetite change, fatigue and unexpected weight change.   HENT: Negative for trouble swallowing and voice change.    Eyes: Negative for redness and visual disturbance.   Respiratory: Negative for cough and wheezing.    Cardiovascular: Negative for chest pain and palpitations.   Gastrointestinal: Negative for abdominal pain, constipation, diarrhea, nausea and vomiting.   Genitourinary: Negative for urgency.   Musculoskeletal: Negative for joint swelling.   Neurological: Negative for syncope and headaches.   Hematological: Negative for adenopathy.   Psychiatric/Behavioral: Negative for sleep disturbance.       Objective   Physical Exam  Physical Exam  Constitutional:       Appearance: He is well-developed.   HENT:      Head: Normocephalic.      Right Ear: External ear normal.   Eyes:      Pupils: Pupils are equal, round, and reactive to light.   Neck:      Thyroid: No thyromegaly.   Cardiovascular:      Rate and Rhythm: Normal rate and regular rhythm.      Heart sounds: Normal heart sounds. No murmur heard.    No friction rub. No gallop.   Pulmonary:      Breath sounds: Normal breath sounds.   Abdominal:      General: There is no distension.      Palpations: Abdomen is soft. There is no  "mass.      Tenderness: There is no abdominal tenderness.   Musculoskeletal:         General: Normal range of motion.      Cervical back: Normal range of motion.      Comments: No peripheral edema 2+ pulses get up and go 3 to 5 seconds no skin changes   Skin:     General: Skin is warm and dry.   Neurological:      Mental Status: He is alert and oriented to person, place, and time.      Deep Tendon Reflexes: Reflexes are normal and symmetric.   Psychiatric:         Attention and Perception: Attention normal.         Mood and Affect: Mood normal.         Speech: Speech normal.         Behavior: Behavior normal.         Thought Content: Thought content normal.         Cognition and Memory: Cognition normal.           Visit Vitals  /76   Ht 175.3 cm (69\")   Wt 102 kg (223 lb 14.4 oz)   BMI 33.06 kg/m²     Body mass index is 33.06 kg/m².  /76   Ht 175.3 cm (69\")   Wt 102 kg (223 lb 14.4 oz)   BMI 33.06 kg/m²       Assessment/Plan   Diagnoses and all orders for this visit:    1. Primary hypertension (Primary)  -     Magnesium; Future  -     Comprehensive Metabolic Panel; Future    2. Pure hypercholesterolemia  -     Lipid Panel With LDL / HDL Ratio; Future    3. Class 1 obesity due to excess calories with serious comorbidity and body mass index (BMI) of 33.0 to 33.9 in adult    4. Benign prostatic hyperplasia with incomplete bladder emptying  -     PSA DIAGNOSTIC; Future    5. Screen for colon cancer  -     Ambulatory Referral to Gastroenterology    Other orders  -     meloxicam (MOBIC) 15 MG tablet; Use qd prn arth pain only  Dispense: 60 tablet; Refill: 5     on wt loss measures and programs  Counseled on hydration lifestyle measures immunizations etc.  Recheck 6 months lab prior  "

## 2022-08-18 ENCOUNTER — OFFICE VISIT (OUTPATIENT)
Dept: GASTROENTEROLOGY | Facility: CLINIC | Age: 72
End: 2022-08-18

## 2022-08-18 VITALS
HEIGHT: 69 IN | BODY MASS INDEX: 33.03 KG/M2 | DIASTOLIC BLOOD PRESSURE: 86 MMHG | HEART RATE: 95 BPM | OXYGEN SATURATION: 97 % | WEIGHT: 223 LBS | SYSTOLIC BLOOD PRESSURE: 158 MMHG

## 2022-08-18 DIAGNOSIS — R10.13 EPIGASTRIC PAIN: Primary | ICD-10-CM

## 2022-08-18 DIAGNOSIS — Z12.11 ENCOUNTER FOR SCREENING FOR MALIGNANT NEOPLASM OF COLON: ICD-10-CM

## 2022-08-18 PROCEDURE — 99204 OFFICE O/P NEW MOD 45 MIN: CPT | Performed by: INTERNAL MEDICINE

## 2022-08-18 RX ORDER — SODIUM, POTASSIUM,MAG SULFATES 17.5-3.13G
1 SOLUTION, RECONSTITUTED, ORAL ORAL EVERY 12 HOURS
Qty: 1 ML | Refills: 0 | Status: SHIPPED | OUTPATIENT
Start: 2022-08-18 | End: 2022-12-14

## 2022-08-18 RX ORDER — DEXTROSE AND SODIUM CHLORIDE 5; .45 G/100ML; G/100ML
30 INJECTION, SOLUTION INTRAVENOUS CONTINUOUS PRN
Status: CANCELLED | OUTPATIENT
Start: 2022-09-19

## 2022-08-18 NOTE — PROGRESS NOTES
[facility]      Chief Complaint:   Chief Complaint   Patient presents with   • Colonoscopy       Subjective     HPI:   Patient with some epigastric abdominal pain and some bulging in the epigastric region.  Patient states that he is on a proton pump inhibitor secondary to a previous esophageal spasm many years ago.  Patient is also due for screening colonoscopy its been greater than 10 years since his last colonoscopy.    Plan; schedule patient for EGD and colonoscopy to evaluate    Past Medical History:   Past Medical History:   Diagnosis Date   • Acute maxillary sinusitis    • Allergic rhinitis      Moderately severe      • Arthritis    • Arthritis    • Atopic conjunctivitis    • Benign prostatic hyperplasia    • Bilateral hearing loss    • Cellulitis     CHESTWAL   • Chronic otitis externa    • Common cold    • Diabetes (HCC)    • Diverticular disease of colon    • Dyspnea    • Essential hypertension    • Fracture of pelvis (HCC)    • Fracture of wrist    • Fracture, radius    • Fracture, ulna    • GERD (gastroesophageal reflux disease)    • GERD (gastroesophageal reflux disease)    • Hiatal hernia    • Hiatal hernia    • Hyperlipidemia    • Hyperlipidemia    • Hypertension    • Hypertension    • Hypertensive disorder    • Immunization due    • Inflamed seborrheic keratosis    • Influenza vaccination given     Influenza vaccination   12/10/2013   • Low back pain    • Need for immunization against influenza    • Need for prophylactic vaccination against Streptococcus pneumoniae (pneumococcus)    • Need for prophylactic vaccination with combined diphtheria-tetanus-pertussis (DTP) vaccine     Need for prophylactic vaccination and inoculation against Diptheria-tetanus-pertussis, combined [DTP] [DTaP]     • Nuclear senile cataract     moderate   • Onychomycosis    • Osteoarthritis of multiple joints    • Primary open angle glaucoma     Right eye IOP too high s/p LT      • Primary  osteoarthritis of both knees    • Rhinitis    • Screening for malignant neoplasm of colon    • Screening for malignant neoplasm of prostate    • Shoulder pain    • Temporomandibular joint disorder    • Vitamin D deficiency        Past Surgical History:  Past Surgical History:   Procedure Laterality Date   • BACK SURGERY     • BACK SURGERY  1982    Low back disk surgery    • CARDIAC CATHETERIZATION  02/08/2011    Symptoms of angina that have resolved with normalization of blood pressure. Very mild nonobstructive coronary artery disease. Normal systolic function.   • CRYOTHERAPY  06/07/2013    CRYOTHERAPY OF ACNE   • ENDOSCOPY AND COLONOSCOPY  01/16/2012    x2, Diverticulosis in sigmoid colon. Hemorrhoids in anus.   • ENDOSCOPY W/ PEG TUBE PLACEMENT  04/12/2002    EGD w/ tube: Dysphagia, Esophagitis, Hiatal hernia, Duodenitis.   • INCISION AND DRAINAGE ABSCESS  04/10/2013    I&D, Simple    • INJECTION OF MEDICATION  05/25/2016    Kenalog x3   • KNEE SURGERY     • KNEE SURGERY  05/12/2003    x3, Arthroscopic medial meniscectomy, right knee.   • OTHER SURGICAL HISTORY  09/11/1996    Forearm or wrist surgery: x2, Removal of K-wires, left radius    • OTHER SURGICAL HISTORY  04/25/1986    Hand/finger surgery: Repair radial collateral ligament, I&D, right fifth finger. Repair laceration.   • PROCTOSIGMOIDOSCOPY  04/23/1982    Proctosigmoidoscopy, Rigid: Normal   • REFRACTIVE SURGERY  12/29/2014    LASER SURGERY OF EYE: x2   • WRIST SURGERY         Family History:  Family History   Problem Relation Age of Onset   • Diabetes Mother    • Heart disease Mother 60        MI   • Arthritis Mother    • Hypertension Mother    • Hyperlipidemia Mother    • Kidney disease Mother    • Thyroid disease Mother    • Diabetes Father    • Heart disease Father    • Arthritis Father    • Hypertension Father    • Hyperlipidemia Father    • Stroke Father 80   • Heart disease Brother 40        cabg   • Diabetes Other    • Heart disease Other    •  Hypertension Other    • Cancer Neg Hx        Social History:   reports that he has quit smoking. He has never used smokeless tobacco. He reports current alcohol use. He reports that he does not use drugs.    Medications:   Prior to Admission medications    Medication Sig Start Date End Date Taking? Authorizing Provider   finasteride (PROSCAR) 5 MG tablet Take 1 tablet by mouth Daily. 12/14/21  Yes Blanco Drummond MD   fluocinolone (SYNALAR) 0.01 % external solution 3 drops each ear twice a day for 7 days as needed for itching 4/28/22  Yes Mack Yeager MD   fluticasone (Flonase) 50 MCG/ACT nasal spray 2 sprays into the nostril(s) as directed by provider Daily. 12/14/21  Yes Blanco Drummond MD   meloxicam (MOBIC) 15 MG tablet Use qd prn arth pain only 6/14/22  Yes Blanco Drummond MD   montelukast (SINGULAIR) 10 MG tablet Take 1 tablet by mouth every night at bedtime. 12/14/21  Yes Blanco Drummond MD   omeprazole (priLOSEC) 20 MG capsule Take 1 capsule by mouth Daily. Prn reflux 12/14/21  Yes Blanco Drummond MD   pravastatin (PRAVACHOL) 40 MG tablet Take 1 tablet by mouth Daily. 12/14/21  Yes Blanco Drummond MD   tamsulosin (FLOMAX) 0.4 MG capsule 24 hr capsule 1 to 2 capsules daily for prostate  Patient taking differently: 2 capsules. 1 to 2 capsules daily for prostate 2/10/22  Yes Blanco Drummond MD   triamterene-hydrochlorothiazide (MAXZIDE-25) 37.5-25 MG per tablet TAKE 1 TABLET DAILY FOR BLOOD PRESSURE. 12/20/21  Yes Blanco Drummond MD   sodium-potassium-magnesium sulfates (SUPREP) 17.5-3.13-1.6 GM/177ML solution oral solution Take 1 bottle by mouth Every 12 (Twelve) Hours. 8/18/22   James Lynne MD       Allergies:  Penicillins and Wasp venom    ROS:    Review of Systems   Constitutional: Negative for activity change, appetite change and unexpected weight change.   HENT: Negative for congestion, sore throat and trouble swallowing.    Respiratory: Negative for cough, choking and shortness of breath.   "  Cardiovascular: Negative for chest pain.   Gastrointestinal: Negative for abdominal distention, abdominal pain, anal bleeding, blood in stool, constipation, diarrhea, nausea, rectal pain and vomiting.   Endocrine: Negative for heat intolerance, polydipsia and polyphagia.   Genitourinary: Negative for difficulty urinating.   Musculoskeletal: Negative for arthralgias.   Skin: Negative for color change, pallor, rash and wound.   Allergic/Immunologic: Negative for food allergies.   Neurological: Negative for dizziness, syncope, weakness and headaches.   Psychiatric/Behavioral: Negative for agitation, behavioral problems, confusion and decreased concentration.     Objective     Blood pressure 158/86, pulse 95, height 175.3 cm (69\"), weight 101 kg (223 lb), SpO2 97 %.    Physical Exam  Constitutional:       General: He is not in acute distress.     Appearance: He is well-developed. He is not diaphoretic.   HENT:      Head: Normocephalic and atraumatic.   Cardiovascular:      Rate and Rhythm: Normal rate and regular rhythm.      Heart sounds: Normal heart sounds. No murmur heard.    No friction rub. No gallop.   Pulmonary:      Effort: No respiratory distress.      Breath sounds: Normal breath sounds. No wheezing or rales.   Chest:      Chest wall: No tenderness.   Abdominal:      General: Bowel sounds are normal. There is no distension.      Palpations: Abdomen is soft. There is no mass.      Tenderness: There is no abdominal tenderness. There is no guarding or rebound.      Hernia: No hernia is present.   Musculoskeletal:         General: Normal range of motion.   Skin:     General: Skin is warm and dry.      Coloration: Skin is not pale.      Findings: No erythema or rash.   Neurological:      Mental Status: He is alert and oriented to person, place, and time.   Psychiatric:         Behavior: Behavior normal.         Thought Content: Thought content normal.         Judgment: Judgment normal.          Assessment & Plan "   Diagnoses and all orders for this visit:    1. Epigastric pain (Primary)  -     Case Request; Standing  -     Case Request    2. Encounter for screening for malignant neoplasm of colon  -     Case Request; Standing  -     Case Request    Other orders  -     Follow Anesthesia Guidelines / Standing Orders; Future  -     Obtain Informed Consent; Future  -     sodium-potassium-magnesium sulfates (SUPREP) 17.5-3.13-1.6 GM/177ML solution oral solution; Take 1 bottle by mouth Every 12 (Twelve) Hours.  Dispense: 1 mL; Refill: 0        ESOPHAGOGASTRODUODENOSCOPY (N/A), COLONOSCOPY (N/A)     Diagnosis Plan   1. Epigastric pain  Case Request    Case Request   2. Encounter for screening for malignant neoplasm of colon  Case Request    Case Request       Anticipated Surgical Procedure:  Orders Placed This Encounter   Procedures   • Follow Anesthesia Guidelines / Standing Orders     Standing Status:   Future   • Obtain Informed Consent     Standing Status:   Future     Order Specific Question:   Informed Consent Given For     Answer:   egd and colonoscopy       The risks, benefits, and alternatives of this procedure have been discussed with the patient or the responsible party- the patient understands and agrees to proceed.

## 2022-09-19 ENCOUNTER — HOSPITAL ENCOUNTER (OUTPATIENT)
Facility: HOSPITAL | Age: 72
Setting detail: HOSPITAL OUTPATIENT SURGERY
Discharge: HOME | End: 2022-09-19
Attending: INTERNAL MEDICINE | Admitting: INTERNAL MEDICINE

## 2022-09-19 ENCOUNTER — ANESTHESIA (OUTPATIENT)
Dept: GASTROENTEROLOGY | Facility: HOSPITAL | Age: 72
End: 2022-09-19

## 2022-09-19 ENCOUNTER — ANESTHESIA EVENT (OUTPATIENT)
Dept: GASTROENTEROLOGY | Facility: HOSPITAL | Age: 72
End: 2022-09-19

## 2022-09-19 VITALS
HEART RATE: 81 BPM | OXYGEN SATURATION: 96 % | TEMPERATURE: 97.9 F | SYSTOLIC BLOOD PRESSURE: 109 MMHG | HEIGHT: 69 IN | DIASTOLIC BLOOD PRESSURE: 46 MMHG | BODY MASS INDEX: 32.65 KG/M2 | RESPIRATION RATE: 18 BRPM | WEIGHT: 220.46 LBS

## 2022-09-19 DIAGNOSIS — R10.13 EPIGASTRIC PAIN: ICD-10-CM

## 2022-09-19 DIAGNOSIS — Z12.11 ENCOUNTER FOR SCREENING FOR MALIGNANT NEOPLASM OF COLON: ICD-10-CM

## 2022-09-19 PROCEDURE — 43239 EGD BIOPSY SINGLE/MULTIPLE: CPT | Performed by: INTERNAL MEDICINE

## 2022-09-19 PROCEDURE — 88305 TISSUE EXAM BY PATHOLOGIST: CPT

## 2022-09-19 PROCEDURE — 25010000002 GLUCAGON (HUMAN RECOMBINANT) 1 MG RECONSTITUTED SOLUTION: Performed by: NURSE ANESTHETIST, CERTIFIED REGISTERED

## 2022-09-19 PROCEDURE — G0121 COLON CA SCRN NOT HI RSK IND: HCPCS | Performed by: INTERNAL MEDICINE

## 2022-09-19 PROCEDURE — 25010000002 PROPOFOL 10 MG/ML EMULSION: Performed by: NURSE ANESTHETIST, CERTIFIED REGISTERED

## 2022-09-19 RX ORDER — DEXTROSE AND SODIUM CHLORIDE 5; .45 G/100ML; G/100ML
30 INJECTION, SOLUTION INTRAVENOUS CONTINUOUS PRN
Status: DISCONTINUED | OUTPATIENT
Start: 2022-09-19 | End: 2022-09-19 | Stop reason: HOSPADM

## 2022-09-19 RX ORDER — LIDOCAINE HYDROCHLORIDE 20 MG/ML
INJECTION, SOLUTION INTRAVENOUS AS NEEDED
Status: DISCONTINUED | OUTPATIENT
Start: 2022-09-19 | End: 2022-09-19 | Stop reason: SURG

## 2022-09-19 RX ORDER — PROPOFOL 10 MG/ML
VIAL (ML) INTRAVENOUS AS NEEDED
Status: DISCONTINUED | OUTPATIENT
Start: 2022-09-19 | End: 2022-09-19 | Stop reason: SURG

## 2022-09-19 RX ADMIN — PROPOFOL 100 MG: 10 INJECTION, EMULSION INTRAVENOUS at 16:53

## 2022-09-19 RX ADMIN — LIDOCAINE HYDROCHLORIDE 100 MG: 20 INJECTION, SOLUTION INTRAVENOUS at 16:53

## 2022-09-19 RX ADMIN — PROPOFOL 20 MG: 10 INJECTION, EMULSION INTRAVENOUS at 17:01

## 2022-09-19 RX ADMIN — PROPOFOL 20 MG: 10 INJECTION, EMULSION INTRAVENOUS at 16:55

## 2022-09-19 RX ADMIN — GLUCAGON HYDROCHLORIDE 0.5 MG: KIT at 17:01

## 2022-09-19 RX ADMIN — DEXTROSE AND SODIUM CHLORIDE 30 ML/HR: 5; 450 INJECTION, SOLUTION INTRAVENOUS at 16:13

## 2022-09-19 RX ADMIN — PROPOFOL 20 MG: 10 INJECTION, EMULSION INTRAVENOUS at 16:58

## 2022-09-19 NOTE — ANESTHESIA POSTPROCEDURE EVALUATION
Patient: Kamlesh Moser    Procedure Summary     Date: 09/19/22 Room / Location: [facility] ENDOSCOPY 1 / [facility] ENDOSCOPY    Anesthesia Start: 1649 Anesthesia Stop: 1705    Procedures:       ESOPHAGOGASTRODUODENOSCOPY (N/A )      COLONOSCOPY (N/A ) Diagnosis:       Epigastric pain      Encounter for screening for malignant neoplasm of colon      (Epigastric pain [R10.13])      (Encounter for screening for malignant neoplasm of colon [Z12.11])    Surgeons: James Lynne MD Provider: Jose Jacome CRNA    Anesthesia Type: MAC ASA Status: 3          Anesthesia Type: MAC    Vitals  No vitals data found for the desired time range.          Post Anesthesia Care and Evaluation    Patient location during evaluation: bedside  Patient participation: complete - patient participated  Level of consciousness: sleepy but conscious  Pain score: 0  Pain management: adequate    Airway patency: patent  Anesthetic complications: No anesthetic complications  PONV Status: none  Cardiovascular status: acceptable  Respiratory status: acceptable  Hydration status: acceptable    Comments: ---------------------------               09/19/22                      1706      ---------------------------   BP:        133/60   Pulse:         83           Resp:          18           Temp:   97.8 °F (36.6 °C)   SpO2:          96%         ---------------------------

## 2022-09-19 NOTE — ANESTHESIA PREPROCEDURE EVALUATION
Anesthesia Evaluation     Patient summary reviewed and Nursing notes reviewed   NPO Solid Status: > 8 hours  NPO Liquid Status: > 4 hours           Airway   Mallampati: II  No difficulty expected  Dental - normal exam     Pulmonary - normal exam   (+) a smoker Former, recent URI resolved,   Cardiovascular     Rhythm: regular  Rate: normal    (+) hypertension well controlled less than 2 medications, hyperlipidemia,       Neuro/Psych- negative ROS  GI/Hepatic/Renal/Endo    (+)   liver disease fatty liver disease, diabetes mellitus type 2 well controlled,     Musculoskeletal     Abdominal    Substance History      OB/GYN          Other   arthritis,                      Anesthesia Plan    ASA 3     MAC     intravenous induction     Anesthetic plan, risks, benefits, and alternatives have been provided, discussed and informed consent has been obtained with: patient.    Plan discussed with CRNA.        CODE STATUS:

## 2022-09-19 NOTE — H&P
[facility]      Chief Complaint:   No chief complaint on file.      Subjective    I agree with the current note with no changes in the history.    HPI:   Patient with some epigastric abdominal pain and some bulging in the epigastric region.  Patient states that he is on a proton pump inhibitor secondary to a previous esophageal spasm many years ago.  Patient is also due for screening colonoscopy its been greater than 10 years since his last colonoscopy.    Plan; schedule patient for EGD and colonoscopy to evaluate    Past Medical History:   Past Medical History:   Diagnosis Date   • Acute maxillary sinusitis    • Allergic rhinitis      Moderately severe      • Arthritis    • Arthritis    • Atopic conjunctivitis    • Benign prostatic hyperplasia    • Bilateral hearing loss    • Cellulitis     CHESTWAL   • Chronic otitis externa    • Common cold    • Diabetes (HCC)    • Diverticular disease of colon    • Dyspnea    • Essential hypertension    • Fracture of pelvis (HCC)    • Fracture of wrist    • Fracture, radius    • Fracture, ulna    • GERD (gastroesophageal reflux disease)    • GERD (gastroesophageal reflux disease)    • Hiatal hernia    • Hiatal hernia    • Hyperlipidemia    • Hyperlipidemia    • Hypertension    • Hypertension    • Hypertensive disorder    • Immunization due    • Inflamed seborrheic keratosis    • Influenza vaccination given     Influenza vaccination   12/10/2013   • Low back pain    • Need for immunization against influenza    • Need for prophylactic vaccination against Streptococcus pneumoniae (pneumococcus)    • Need for prophylactic vaccination with combined diphtheria-tetanus-pertussis (DTP) vaccine     Need for prophylactic vaccination and inoculation against Diptheria-tetanus-pertussis, combined [DTP] [DTaP]     • Nuclear senile cataract     moderate   • Onychomycosis    • Osteoarthritis of multiple joints    • Primary open angle glaucoma     Right eye IOP too high  s/p LT      • Primary osteoarthritis of both knees    • Rhinitis    • Screening for malignant neoplasm of colon    • Screening for malignant neoplasm of prostate    • Shoulder pain    • Temporomandibular joint disorder    • Vitamin D deficiency        Past Surgical History:    Past Surgical History:   Procedure Laterality Date   • BACK SURGERY     • BACK SURGERY  1982    Low back disk surgery    • CARDIAC CATHETERIZATION  02/08/2011    Symptoms of angina that have resolved with normalization of blood pressure. Very mild nonobstructive coronary artery disease. Normal systolic function.   • CRYOTHERAPY  06/07/2013    CRYOTHERAPY OF ACNE   • ENDOSCOPY AND COLONOSCOPY  01/16/2012    x2, Diverticulosis in sigmoid colon. Hemorrhoids in anus.   • ENDOSCOPY W/ PEG TUBE PLACEMENT  04/12/2002    EGD w/ tube: Dysphagia, Esophagitis, Hiatal hernia, Duodenitis.   • INCISION AND DRAINAGE ABSCESS  04/10/2013    I&D, Simple    • INJECTION OF MEDICATION  05/25/2016    Kenalog x3   • KNEE SURGERY     • KNEE SURGERY  05/12/2003    x3, Arthroscopic medial meniscectomy, right knee.   • OTHER SURGICAL HISTORY  09/11/1996    Forearm or wrist surgery: x2, Removal of K-wires, left radius    • OTHER SURGICAL HISTORY  04/25/1986    Hand/finger surgery: Repair radial collateral ligament, I&D, right fifth finger. Repair laceration.   • PROCTOSIGMOIDOSCOPY  04/23/1982    Proctosigmoidoscopy, Rigid: Normal   • REFRACTIVE SURGERY  12/29/2014    LASER SURGERY OF EYE: x2   • WRIST SURGERY         Family History:  Family History   Problem Relation Age of Onset   • Diabetes Mother    • Heart disease Mother 60        MI   • Arthritis Mother    • Hypertension Mother    • Hyperlipidemia Mother    • Kidney disease Mother    • Thyroid disease Mother    • Diabetes Father    • Heart disease Father    • Arthritis Father    • Hypertension Father    • Hyperlipidemia Father    • Stroke Father 80   • Heart disease Brother 40        cabg   • Diabetes Other    •  Heart disease Other    • Hypertension Other    • Cancer Neg Hx        Social History:   reports that he has quit smoking. He has never used smokeless tobacco. He reports current alcohol use. He reports that he does not use drugs.    Medications:   Prior to Admission medications    Medication Sig Start Date End Date Taking? Authorizing Provider   finasteride (PROSCAR) 5 MG tablet Take 1 tablet by mouth Daily. 12/14/21  Yes Blanco Drummond MD   fluocinolone (SYNALAR) 0.01 % external solution 3 drops each ear twice a day for 7 days as needed for itching 4/28/22  Yes Mack Yaeger MD   fluticasone (Flonase) 50 MCG/ACT nasal spray 2 sprays into the nostril(s) as directed by provider Daily. 12/14/21  Yes Blanco Drummond MD   meloxicam (MOBIC) 15 MG tablet Use qd prn arth pain only 6/14/22  Yes Blanco Drummond MD   montelukast (SINGULAIR) 10 MG tablet Take 1 tablet by mouth every night at bedtime. 12/14/21  Yes Blanco Drummond MD   omeprazole (priLOSEC) 20 MG capsule Take 1 capsule by mouth Daily. Prn reflux 12/14/21  Yes Blanco Drummond MD   pravastatin (PRAVACHOL) 40 MG tablet Take 1 tablet by mouth Daily. 12/14/21  Yes Blanco Drummond MD   tamsulosin (FLOMAX) 0.4 MG capsule 24 hr capsule 1 to 2 capsules daily for prostate  Patient taking differently: 2 capsules. 1 to 2 capsules daily for prostate 2/10/22  Yes Blanco Drummond MD   triamterene-hydrochlorothiazide (MAXZIDE-25) 37.5-25 MG per tablet TAKE 1 TABLET DAILY FOR BLOOD PRESSURE. 12/20/21  Yes Blanco Drummond MD   sodium-potassium-magnesium sulfates (SUPREP) 17.5-3.13-1.6 GM/177ML solution oral solution Take 1 bottle by mouth Every 12 (Twelve) Hours. 8/18/22   James Lynne MD       Allergies:  Penicillins and Wasp venom    ROS:    Review of Systems   Constitutional: Negative for activity change, appetite change and unexpected weight change.   HENT: Negative for congestion, sore throat and trouble swallowing.    Respiratory: Negative for cough, choking and  "shortness of breath.    Cardiovascular: Negative for chest pain.   Gastrointestinal: Negative for abdominal distention, abdominal pain, anal bleeding, blood in stool, constipation, diarrhea, nausea, rectal pain and vomiting.   Endocrine: Negative for heat intolerance, polydipsia and polyphagia.   Genitourinary: Negative for difficulty urinating.   Musculoskeletal: Negative for arthralgias.   Skin: Negative for color change, pallor, rash and wound.   Allergic/Immunologic: Negative for food allergies.   Neurological: Negative for dizziness, syncope, weakness and headaches.   Psychiatric/Behavioral: Negative for agitation, behavioral problems, confusion and decreased concentration.     Objective     Height 175.3 cm (69\"), weight 101 kg (222 lb).   /46 (Patient Position: Lying)   Pulse 81   Temp 97.9 °F (36.6 °C) (Temporal)   Resp 18   Ht 175.3 cm (69\")   Wt 100 kg (220 lb 7.4 oz)   SpO2 96%   BMI 32.56 kg/m²       Physical Exam  Constitutional:       General: He is not in acute distress.     Appearance: He is well-developed. He is not diaphoretic.   HENT:      Head: Normocephalic and atraumatic.   Cardiovascular:      Rate and Rhythm: Normal rate and regular rhythm.      Heart sounds: Normal heart sounds. No murmur heard.    No friction rub. No gallop.   Pulmonary:      Effort: No respiratory distress.      Breath sounds: Normal breath sounds. No wheezing or rales.   Chest:      Chest wall: No tenderness.   Abdominal:      General: Bowel sounds are normal. There is no distension.      Palpations: Abdomen is soft. There is no mass.      Tenderness: There is no abdominal tenderness. There is no guarding or rebound.      Hernia: No hernia is present.   Musculoskeletal:         General: Normal range of motion.   Skin:     General: Skin is warm and dry.      Coloration: Skin is not pale.      Findings: No erythema or rash.   Neurological:      Mental Status: He is alert and oriented to person, place, and time. "   Psychiatric:         Behavior: Behavior normal.         Thought Content: Thought content normal.         Judgment: Judgment normal.          Assessment & Plan   Diagnoses and all orders for this visit:    1. Epigastric pain  -     dextrose 5 % and sodium chloride 0.45 % infusion    2. Encounter for screening for malignant neoplasm of colon  -     dextrose 5 % and sodium chloride 0.45 % infusion    Other orders  -     Implement Anesthesia Orders Day of Procedure; Standing  -     Obtain Informed Consent; Standing  -     POC Glucose Once; Standing  -     Insert Peripheral IV; Standing  -     Implement Anesthesia Orders Day of Procedure  -     Obtain Informed Consent  -     POC Glucose Once  -     Insert Peripheral IV        ESOPHAGOGASTRODUODENOSCOPY (N/A), COLONOSCOPY (N/A)     Diagnosis Plan   1. Epigastric pain  dextrose 5 % and sodium chloride 0.45 % infusion   2. Encounter for screening for malignant neoplasm of colon  dextrose 5 % and sodium chloride 0.45 % infusion       Anticipated Surgical Procedure:  Orders Placed This Encounter   Procedures   • Implement Anesthesia Orders Day of Procedure     Standing Status:   Standing     Number of Occurrences:   1   • Obtain Informed Consent     Standing Status:   Standing     Number of Occurrences:   1     Order Specific Question:   Informed Consent Given For     Answer:   egd and colonoscopy   • POC Glucose Once     Prior to Procedure on ALL Diabetic Patients     Standing Status:   Standing     Number of Occurrences:   1   • Insert Peripheral IV     Standing Status:   Standing     Number of Occurrences:   1       The risks, benefits, and alternatives of this procedure have been discussed with the patient or the responsible party- the patient understands and agrees to proceed.

## 2022-09-23 LAB — REF LAB TEST METHOD: NORMAL

## 2022-09-30 ENCOUNTER — OFFICE VISIT (OUTPATIENT)
Dept: GASTROENTEROLOGY | Facility: CLINIC | Age: 72
End: 2022-09-30

## 2022-09-30 VITALS
HEART RATE: 84 BPM | HEIGHT: 69 IN | SYSTOLIC BLOOD PRESSURE: 150 MMHG | DIASTOLIC BLOOD PRESSURE: 80 MMHG | WEIGHT: 225.4 LBS | BODY MASS INDEX: 33.38 KG/M2

## 2022-09-30 DIAGNOSIS — K21.9 GASTROESOPHAGEAL REFLUX DISEASE, UNSPECIFIED WHETHER ESOPHAGITIS PRESENT: Primary | ICD-10-CM

## 2022-09-30 PROCEDURE — 99213 OFFICE O/P EST LOW 20 MIN: CPT | Performed by: INTERNAL MEDICINE

## 2022-09-30 RX ORDER — FAMOTIDINE 20 MG/1
20 TABLET, FILM COATED ORAL NIGHTLY PRN
Qty: 30 TABLET | Refills: 5 | Status: SHIPPED | OUTPATIENT
Start: 2022-09-30 | End: 2023-01-06 | Stop reason: SDUPTHER

## 2022-09-30 NOTE — PROGRESS NOTES
[facility]      Chief Complaint:   Chief Complaint   Patient presents with   • Abdominal Pain       Subjective     HPI:   Patient with epigastric abdominal pain patient had EGD which showed mild esophagitis and gastritis patient is on a proton pump inhibitor.  We will start patient on Pepcid nightly.  Colonoscopy shows multiple diverticula discussed patient increase in the amount of fiber in his diet patient on a repeat colonoscopy in 10 years    Plan; we will follow-up in 3 months.  We will start patient on Pepcid nightly.    Past Medical History:   Past Medical History:   Diagnosis Date   • Acute maxillary sinusitis    • Allergic rhinitis      Moderately severe      • Arthritis    • Arthritis    • Atopic conjunctivitis    • Benign prostatic hyperplasia    • Bilateral hearing loss    • Cellulitis     CHESTWAL   • Chronic otitis externa    • Common cold    • Diabetes (HCC)    • Diverticular disease of colon    • Dyspnea    • Essential hypertension    • Fracture of pelvis (HCC)    • Fracture of wrist    • Fracture, radius    • Fracture, ulna    • GERD (gastroesophageal reflux disease)    • GERD (gastroesophageal reflux disease)    • Hiatal hernia    • Hiatal hernia    • Hyperlipidemia    • Hyperlipidemia    • Hypertension    • Hypertension    • Hypertensive disorder    • Immunization due    • Inflamed seborrheic keratosis    • Influenza vaccination given     Influenza vaccination   12/10/2013   • Low back pain    • Need for immunization against influenza    • Need for prophylactic vaccination against Streptococcus pneumoniae (pneumococcus)    • Need for prophylactic vaccination with combined diphtheria-tetanus-pertussis (DTP) vaccine     Need for prophylactic vaccination and inoculation against Diptheria-tetanus-pertussis, combined [DTP] [DTaP]     • Nuclear senile cataract     moderate   • Onychomycosis    • Osteoarthritis of multiple joints    • Primary open angle glaucoma     Right eye IOP  too high s/p LT      • Primary osteoarthritis of both knees    • Rhinitis    • Screening for malignant neoplasm of colon    • Screening for malignant neoplasm of prostate    • Shoulder pain    • Temporomandibular joint disorder    • Vitamin D deficiency        Past Surgical History:    Past Surgical History:   Procedure Laterality Date   • BACK SURGERY     • BACK SURGERY  1982    Low back disk surgery    • CARDIAC CATHETERIZATION  02/08/2011    Symptoms of angina that have resolved with normalization of blood pressure. Very mild nonobstructive coronary artery disease. Normal systolic function.   • COLONOSCOPY N/A 9/19/2022    Procedure: COLONOSCOPY;  Surgeon: James Lynne MD;  Location: [facility] ENDOSCOPY;  Service: Gastroenterology;  Laterality: N/A;   • CRYOTHERAPY  06/07/2013    CRYOTHERAPY OF ACNE   • ENDOSCOPY N/A 9/19/2022    Procedure: ESOPHAGOGASTRODUODENOSCOPY;  Surgeon: James Lynne MD;  Location: [facility] ENDOSCOPY;  Service: Gastroenterology;  Laterality: N/A;   • ENDOSCOPY AND COLONOSCOPY  01/16/2012    x2, Diverticulosis in sigmoid colon. Hemorrhoids in anus.   • ENDOSCOPY W/ PEG TUBE PLACEMENT  04/12/2002    EGD w/ tube: Dysphagia, Esophagitis, Hiatal hernia, Duodenitis.   • INCISION AND DRAINAGE ABSCESS  04/10/2013    I&D, Simple    • INJECTION OF MEDICATION  05/25/2016    Kenalog x3   • KNEE SURGERY     • KNEE SURGERY  05/12/2003    x3, Arthroscopic medial meniscectomy, right knee.   • OTHER SURGICAL HISTORY  09/11/1996    Forearm or wrist surgery: x2, Removal of K-wires, left radius    • OTHER SURGICAL HISTORY  04/25/1986    Hand/finger surgery: Repair radial collateral ligament, I&D, right fifth finger. Repair laceration.   • PROCTOSIGMOIDOSCOPY  04/23/1982    Proctosigmoidoscopy, Rigid: Normal   • REFRACTIVE SURGERY  12/29/2014    LASER SURGERY OF EYE: x2   • WRIST SURGERY         Family History:  Family History   Problem Relation Age of Onset   • Diabetes Mother    • Heart disease Mother 60         MI   • Arthritis Mother    • Hypertension Mother    • Hyperlipidemia Mother    • Kidney disease Mother    • Thyroid disease Mother    • Diabetes Father    • Heart disease Father    • Arthritis Father    • Hypertension Father    • Hyperlipidemia Father    • Stroke Father 80   • Heart disease Brother 40        cabg   • Diabetes Other    • Heart disease Other    • Hypertension Other    • Cancer Neg Hx        Social History:   reports that he has quit smoking. He has never used smokeless tobacco. He reports current alcohol use. He reports that he does not use drugs.    Medications:   Prior to Admission medications    Medication Sig Start Date End Date Taking? Authorizing Provider   finasteride (PROSCAR) 5 MG tablet Take 1 tablet by mouth Daily. 12/14/21  Yes Blanco Drummond MD   fluocinolone (SYNALAR) 0.01 % external solution 3 drops each ear twice a day for 7 days as needed for itching 4/28/22  Yes Mack Yeager MD   meloxicam (MOBIC) 15 MG tablet Use qd prn arth pain only 6/14/22  Yes Blanco Drummond MD   montelukast (SINGULAIR) 10 MG tablet Take 1 tablet by mouth every night at bedtime. 12/14/21  Yes Blanco Drummond MD   omeprazole (priLOSEC) 20 MG capsule Take 1 capsule by mouth Daily. Prn reflux 12/14/21  Yes Blanco Drummond MD   pravastatin (PRAVACHOL) 40 MG tablet Take 1 tablet by mouth Daily. 12/14/21  Yes Blanco Drummond MD   tamsulosin (FLOMAX) 0.4 MG capsule 24 hr capsule 1 to 2 capsules daily for prostate  Patient taking differently: 2 capsules. 1 to 2 capsules daily for prostate 2/10/22  Yes Blanco Drummond MD   triamterene-hydrochlorothiazide (MAXZIDE-25) 37.5-25 MG per tablet TAKE 1 TABLET DAILY FOR BLOOD PRESSURE. 12/20/21  Yes Blanco Drummond MD   famotidine (Pepcid) 20 MG tablet Take 1 tablet by mouth At Night As Needed for Heartburn. 9/30/22   James Lynne MD   sodium-potassium-magnesium sulfates (SUPREP) 17.5-3.13-1.6 GM/177ML solution oral solution Take 1 bottle by mouth Every 12  "(Twelve) Hours. 8/18/22   James Lynne MD       Allergies:  Penicillins and Wasp venom    ROS:    Review of Systems  Objective     Blood pressure 150/80, pulse 84, height 175.3 cm (69\"), weight 102 kg (225 lb 6.4 oz).    Physical Exam     Assessment & Plan   Diagnoses and all orders for this visit:    1. Gastroesophageal reflux disease, unspecified whether esophagitis present (Primary)    Other orders  -     famotidine (Pepcid) 20 MG tablet; Take 1 tablet by mouth At Night As Needed for Heartburn.  Dispense: 30 tablet; Refill: 5        * Surgery not found *     Diagnosis Plan   1. Gastroesophageal reflux disease, unspecified whether esophagitis present         Anticipated Surgical Procedure:  No orders of the defined types were placed in this encounter.      The risks, benefits, and alternatives of this procedure have been discussed with the patient or the responsible party- the patient understands and agrees to proceed.                                                                "

## 2022-11-04 DIAGNOSIS — R39.14 BENIGN PROSTATIC HYPERPLASIA WITH INCOMPLETE BLADDER EMPTYING: Chronic | ICD-10-CM

## 2022-11-04 DIAGNOSIS — N40.1 BENIGN PROSTATIC HYPERPLASIA WITH INCOMPLETE BLADDER EMPTYING: Chronic | ICD-10-CM

## 2022-11-04 RX ORDER — TAMSULOSIN HYDROCHLORIDE 0.4 MG/1
CAPSULE ORAL
Qty: 120 CAPSULE | Refills: 3 | Status: SHIPPED | OUTPATIENT
Start: 2022-11-04

## 2022-12-08 ENCOUNTER — LAB (OUTPATIENT)
Dept: LAB | Facility: HOSPITAL | Age: 72
End: 2022-12-08

## 2022-12-08 DIAGNOSIS — R39.14 BENIGN PROSTATIC HYPERPLASIA WITH INCOMPLETE BLADDER EMPTYING: Chronic | ICD-10-CM

## 2022-12-08 DIAGNOSIS — E78.00 PURE HYPERCHOLESTEROLEMIA: Chronic | ICD-10-CM

## 2022-12-08 DIAGNOSIS — I10 PRIMARY HYPERTENSION: Chronic | ICD-10-CM

## 2022-12-08 DIAGNOSIS — N40.1 BENIGN PROSTATIC HYPERPLASIA WITH INCOMPLETE BLADDER EMPTYING: Chronic | ICD-10-CM

## 2022-12-08 LAB
ALBUMIN SERPL-MCNC: 4.3 G/DL (ref 3.5–5.2)
ALBUMIN/GLOB SERPL: 2 G/DL
ALP SERPL-CCNC: 59 U/L (ref 39–117)
ALT SERPL W P-5'-P-CCNC: 33 U/L (ref 1–41)
ANION GAP SERPL CALCULATED.3IONS-SCNC: 12 MMOL/L (ref 5–15)
AST SERPL-CCNC: 19 U/L (ref 1–40)
BILIRUB SERPL-MCNC: 0.2 MG/DL (ref 0–1.2)
BUN SERPL-MCNC: 13 MG/DL (ref 8–23)
BUN/CREAT SERPL: 13.5 (ref 7–25)
CALCIUM SPEC-SCNC: 9.4 MG/DL (ref 8.6–10.5)
CHLORIDE SERPL-SCNC: 96 MMOL/L (ref 98–107)
CHOLEST SERPL-MCNC: 154 MG/DL (ref 0–200)
CO2 SERPL-SCNC: 25 MMOL/L (ref 22–29)
CREAT SERPL-MCNC: 0.96 MG/DL (ref 0.76–1.27)
EGFRCR SERPLBLD CKD-EPI 2021: 82.9 ML/MIN/1.73
GLOBULIN UR ELPH-MCNC: 2.2 GM/DL
GLUCOSE SERPL-MCNC: 223 MG/DL (ref 65–99)
HDLC SERPL-MCNC: 51 MG/DL (ref 40–60)
LDLC SERPL CALC-MCNC: 67 MG/DL (ref 0–100)
LDLC/HDLC SERPL: 1.14 {RATIO}
MAGNESIUM SERPL-MCNC: 2.1 MG/DL (ref 1.6–2.4)
POTASSIUM SERPL-SCNC: 4 MMOL/L (ref 3.5–5.2)
PROT SERPL-MCNC: 6.5 G/DL (ref 6–8.5)
PSA SERPL-MCNC: 0.29 NG/ML (ref 0–4)
SODIUM SERPL-SCNC: 133 MMOL/L (ref 136–145)
TRIGL SERPL-MCNC: 224 MG/DL (ref 0–150)
VLDLC SERPL-MCNC: 36 MG/DL (ref 5–40)

## 2022-12-08 PROCEDURE — 83735 ASSAY OF MAGNESIUM: CPT

## 2022-12-08 PROCEDURE — 36415 COLL VENOUS BLD VENIPUNCTURE: CPT

## 2022-12-08 PROCEDURE — 80061 LIPID PANEL: CPT

## 2022-12-08 PROCEDURE — 80053 COMPREHEN METABOLIC PANEL: CPT

## 2022-12-08 PROCEDURE — 84153 ASSAY OF PSA TOTAL: CPT

## 2022-12-13 DIAGNOSIS — N40.1 BENIGN PROSTATIC HYPERPLASIA WITH INCOMPLETE BLADDER EMPTYING: Chronic | ICD-10-CM

## 2022-12-13 DIAGNOSIS — I10 ESSENTIAL HYPERTENSION: Chronic | ICD-10-CM

## 2022-12-13 DIAGNOSIS — K21.9 GASTROESOPHAGEAL REFLUX DISEASE WITHOUT ESOPHAGITIS: Chronic | ICD-10-CM

## 2022-12-13 DIAGNOSIS — R39.14 BENIGN PROSTATIC HYPERPLASIA WITH INCOMPLETE BLADDER EMPTYING: Chronic | ICD-10-CM

## 2022-12-13 RX ORDER — FINASTERIDE 5 MG/1
TABLET, FILM COATED ORAL
Qty: 90 TABLET | Refills: 3 | Status: SHIPPED | OUTPATIENT
Start: 2022-12-13

## 2022-12-13 RX ORDER — OMEPRAZOLE 20 MG/1
CAPSULE, DELAYED RELEASE ORAL
Qty: 90 CAPSULE | Refills: 1 | Status: SHIPPED | OUTPATIENT
Start: 2022-12-13 | End: 2023-01-06 | Stop reason: SDUPTHER

## 2022-12-13 RX ORDER — MONTELUKAST SODIUM 10 MG/1
TABLET ORAL
Qty: 90 TABLET | Refills: 3 | Status: SHIPPED | OUTPATIENT
Start: 2022-12-13

## 2022-12-13 RX ORDER — TRIAMTERENE AND HYDROCHLOROTHIAZIDE 37.5; 25 MG/1; MG/1
TABLET ORAL
Qty: 90 TABLET | Refills: 3 | Status: SHIPPED | OUTPATIENT
Start: 2022-12-13

## 2022-12-14 ENCOUNTER — OFFICE VISIT (OUTPATIENT)
Dept: FAMILY MEDICINE CLINIC | Facility: CLINIC | Age: 72
End: 2022-12-14

## 2022-12-14 VITALS
SYSTOLIC BLOOD PRESSURE: 148 MMHG | DIASTOLIC BLOOD PRESSURE: 72 MMHG | BODY MASS INDEX: 33.68 KG/M2 | HEIGHT: 69 IN | WEIGHT: 227.4 LBS

## 2022-12-14 DIAGNOSIS — L60.3 NAIL DYSTROPHY: ICD-10-CM

## 2022-12-14 DIAGNOSIS — R41.3 MEMORY LOSS: ICD-10-CM

## 2022-12-14 DIAGNOSIS — E11.65 TYPE 2 DIABETES MELLITUS WITH HYPERGLYCEMIA, WITHOUT LONG-TERM CURRENT USE OF INSULIN: Primary | ICD-10-CM

## 2022-12-14 DIAGNOSIS — R39.14 BENIGN PROSTATIC HYPERPLASIA WITH INCOMPLETE BLADDER EMPTYING: Chronic | ICD-10-CM

## 2022-12-14 DIAGNOSIS — E11.9 TYPE 2 DIABETES MELLITUS WITHOUT COMPLICATION, WITHOUT LONG-TERM CURRENT USE OF INSULIN: ICD-10-CM

## 2022-12-14 DIAGNOSIS — N40.1 BENIGN PROSTATIC HYPERPLASIA WITH INCOMPLETE BLADDER EMPTYING: Chronic | ICD-10-CM

## 2022-12-14 DIAGNOSIS — I10 PRIMARY HYPERTENSION: Chronic | ICD-10-CM

## 2022-12-14 DIAGNOSIS — E78.00 PURE HYPERCHOLESTEROLEMIA: Chronic | ICD-10-CM

## 2022-12-14 PROBLEM — R10.13 EPIGASTRIC PAIN: Status: RESOLVED | Noted: 2022-08-18 | Resolved: 2022-12-14

## 2022-12-14 PROBLEM — Z12.11 ENCOUNTER FOR SCREENING FOR MALIGNANT NEOPLASM OF COLON: Status: RESOLVED | Noted: 2022-08-18 | Resolved: 2022-12-14

## 2022-12-14 PROCEDURE — 99214 OFFICE O/P EST MOD 30 MIN: CPT | Performed by: FAMILY MEDICINE

## 2022-12-14 RX ORDER — METFORMIN HYDROCHLORIDE 500 MG/1
500 TABLET, EXTENDED RELEASE ORAL
Qty: 90 TABLET | Refills: 3 | Status: SHIPPED | OUTPATIENT
Start: 2022-12-14

## 2022-12-14 RX ORDER — BLOOD-GLUCOSE METER
KIT MISCELLANEOUS
Qty: 1 EACH | Refills: 5 | Status: SHIPPED | OUTPATIENT
Start: 2022-12-14

## 2022-12-14 RX ORDER — PRAVASTATIN SODIUM 40 MG
40 TABLET ORAL DAILY
Qty: 90 TABLET | Refills: 3 | Status: SHIPPED | OUTPATIENT
Start: 2022-12-14

## 2022-12-14 RX ORDER — LANCETS 28 GAUGE
EACH MISCELLANEOUS
Qty: 10 EACH | Refills: 3 | Status: SHIPPED | OUTPATIENT
Start: 2022-12-14

## 2022-12-14 NOTE — PROGRESS NOTES
Subjective   Kamlesh Moser is a 72 y.o. male.  Reevaluation hypertension hyperlipidemia obesity diagnoses below in the interim lab work revealed nonfasting sugar 233.  This of course is new onset type 2 diabetes.  Of thirst we made arrangements for same.  Also has a dystrophic nail left needs to see dermatology.  Spent majority time today counseling on type 2 diabetes pathophysiology and treatment modalities    History of Present Illness   HPI    The following portions of the patient's history were reviewed and updated as appropriate: allergies, current medications, past family history, past medical history, past social history, past surgical history and problem list.    Review of Systems  Review of Systems   Constitutional: Negative for activity change, appetite change, fatigue and unexpected weight change.   HENT: Positive for hearing loss. Negative for trouble swallowing and voice change.    Eyes: Negative for redness and visual disturbance.   Respiratory: Negative for cough and wheezing.    Cardiovascular: Negative for chest pain and palpitations.   Gastrointestinal: Negative for abdominal pain, constipation, diarrhea, nausea and vomiting.   Genitourinary: Negative for urgency.   Musculoskeletal: Negative for joint swelling.   Skin: Positive for color change (Left thumbnail).   Neurological: Negative for syncope and headaches.   Hematological: Negative for adenopathy.   Psychiatric/Behavioral: Positive for decreased concentration (Objective). Negative for sleep disturbance.       Objective   Physical Exam  Physical Exam  Constitutional:       Appearance: He is well-developed.   HENT:      Head: Normocephalic.   Eyes:      Pupils: Pupils are equal, round, and reactive to light.   Neck:      Thyroid: No thyromegaly.   Cardiovascular:      Rate and Rhythm: Normal rate and regular rhythm.      Heart sounds: Normal heart sounds. No murmur heard.    No friction rub. No gallop.   Pulmonary:      Breath sounds:  Normal breath sounds.   Abdominal:      General: There is no distension.      Palpations: Abdomen is soft. There is no mass.      Tenderness: There is no abdominal tenderness.   Musculoskeletal:         General: Normal range of motion.      Cervical back: Normal range of motion.      Comments: No edema get up and go 3 to 5 seconds.   Skin:     General: Skin is warm and dry.      Comments: Patient has couple seborrheic keratoses and a viral wart on his left anterior chest.  Left thumbnail is indeed a markedly hypertrophied and deformed on the medial aspect with discoloration.  Possibly old trauma versus chronic infection.  Is going need dermatologic evaluation   Neurological:      Mental Status: He is alert and oriented to person, place, and time.      Deep Tendon Reflexes: Reflexes are normal and symmetric.   Psychiatric:         Mood and Affect: Mood normal.         Speech: Speech normal.         Behavior: Behavior is cooperative.       Results for orders placed or performed in visit on 12/08/22   PSA DIAGNOSTIC    Specimen: Blood   Result Value Ref Range    PSA 0.294 0.000 - 4.000 ng/mL   Magnesium    Specimen: Blood   Result Value Ref Range    Magnesium 2.1 1.6 - 2.4 mg/dL   Lipid Panel With LDL / HDL Ratio    Specimen: Blood   Result Value Ref Range    Total Cholesterol 154 0 - 200 mg/dL    Triglycerides 224 (H) 0 - 150 mg/dL    HDL Cholesterol 51 40 - 60 mg/dL    LDL Cholesterol  67 0 - 100 mg/dL    VLDL Cholesterol 36 5 - 40 mg/dL    LDL/HDL Ratio 1.14    Comprehensive Metabolic Panel    Specimen: Blood   Result Value Ref Range    Glucose 223 (H) 65 - 99 mg/dL    BUN 13 8 - 23 mg/dL    Creatinine 0.96 0.76 - 1.27 mg/dL    Sodium 133 (L) 136 - 145 mmol/L    Potassium 4.0 3.5 - 5.2 mmol/L    Chloride 96 (L) 98 - 107 mmol/L    CO2 25.0 22.0 - 29.0 mmol/L    Calcium 9.4 8.6 - 10.5 mg/dL    Total Protein 6.5 6.0 - 8.5 g/dL    Albumin 4.30 3.50 - 5.20 g/dL    ALT (SGPT) 33 1 - 41 U/L    AST (SGOT) 19 1 - 40 U/L     "Alkaline Phosphatase 59 39 - 117 U/L    Total Bilirubin 0.2 0.0 - 1.2 mg/dL    Globulin 2.2 gm/dL    A/G Ratio 2.0 g/dL    BUN/Creatinine Ratio 13.5 7.0 - 25.0    Anion Gap 12.0 5.0 - 15.0 mmol/L    eGFR 82.9 >60.0 mL/min/1.73           Visit Vitals  /72   Ht 175.3 cm (69\")   Wt 103 kg (227 lb 6.4 oz)   BMI 33.58 kg/m²     Body mass index is 33.58 kg/m².    /72   Ht 175.3 cm (69\")   Wt 103 kg (227 lb 6.4 oz)   BMI 33.58 kg/m²     Assessment/Plan   Diagnoses and all orders for this visit:    1. Type 2 diabetes mellitus with hyperglycemia, without long-term current use of insulin (HCC) (Primary)  -     Ambulatory Referral to Nutrition Services    2. Type 2 diabetes mellitus without complication, without long-term current use of insulin ([facility])  -     metFORMIN ER (Glucophage XR) 500 MG 24 hr tablet; Take 1 tablet by mouth Daily With Breakfast. For sugar  Dispense: 90 tablet; Refill: 3  -     Ambulatory Referral to Nutrition Services  -     glucose blood test strip; Qd and prn  Dispense: 100 each; Refill: 3  -     glucose monitor monitoring kit; Use daily and as needed  Dispense: 1 each; Refill: 5  -     Lancets (freestyle) lancets; 1 daily and as needed  Dispense: 10 each; Refill: 3    3. Primary hypertension    4. Pure hypercholesterolemia    5. Benign prostatic hyperplasia with incomplete bladder emptying  -     pravastatin (PRAVACHOL) 40 MG tablet; Take 1 tablet by mouth Daily.  Dispense: 90 tablet; Refill: 3    6. Memory loss  -     Ambulatory Referral to Neurology    7. Nail dystrophy  -     Ambulatory Referral to Dermatology    Referrals made counseled briefly on lifestyle measures for type 2 diabetes.  Referral to nutrition start low-dose metformin glucometer to be used as needed counseled on pathophysiology recheck 4 months A1c here  "

## 2022-12-20 ENCOUNTER — TRANSCRIBE ORDERS (OUTPATIENT)
Dept: LAB | Facility: HOSPITAL | Age: 72
End: 2022-12-20

## 2022-12-20 DIAGNOSIS — Z79.899 ENCOUNTER FOR LONG-TERM (CURRENT) USE OF OTHER MEDICATIONS: Primary | ICD-10-CM

## 2022-12-21 ENCOUNTER — LAB (OUTPATIENT)
Dept: LAB | Facility: HOSPITAL | Age: 72
End: 2022-12-21

## 2022-12-21 LAB
BASOPHILS # BLD AUTO: 0.06 10*3/MM3 (ref 0–0.2)
BASOPHILS NFR BLD AUTO: 0.8 % (ref 0–1.5)
DEPRECATED RDW RBC AUTO: 43.1 FL (ref 37–54)
EOSINOPHIL # BLD AUTO: 0.19 10*3/MM3 (ref 0–0.4)
EOSINOPHIL NFR BLD AUTO: 2.5 % (ref 0.3–6.2)
ERYTHROCYTE [DISTWIDTH] IN BLOOD BY AUTOMATED COUNT: 13.2 % (ref 12.3–15.4)
HCT VFR BLD AUTO: 45.8 % (ref 37.5–51)
HGB BLD-MCNC: 16 G/DL (ref 13–17.7)
IMM GRANULOCYTES # BLD AUTO: 0.02 10*3/MM3 (ref 0–0.05)
IMM GRANULOCYTES NFR BLD AUTO: 0.3 % (ref 0–0.5)
LYMPHOCYTES # BLD AUTO: 1.85 10*3/MM3 (ref 0.7–3.1)
LYMPHOCYTES NFR BLD AUTO: 24.7 % (ref 19.6–45.3)
MCH RBC QN AUTO: 30.9 PG (ref 26.6–33)
MCHC RBC AUTO-ENTMCNC: 34.9 G/DL (ref 31.5–35.7)
MCV RBC AUTO: 88.6 FL (ref 79–97)
MONOCYTES # BLD AUTO: 0.68 10*3/MM3 (ref 0.1–0.9)
MONOCYTES NFR BLD AUTO: 9.1 % (ref 5–12)
NEUTROPHILS NFR BLD AUTO: 4.68 10*3/MM3 (ref 1.7–7)
NEUTROPHILS NFR BLD AUTO: 62.6 % (ref 42.7–76)
NRBC BLD AUTO-RTO: 0 /100 WBC (ref 0–0.2)
PLATELET # BLD AUTO: 237 10*3/MM3 (ref 140–450)
PMV BLD AUTO: 9.7 FL (ref 6–12)
RBC # BLD AUTO: 5.17 10*6/MM3 (ref 4.14–5.8)
WBC NRBC COR # BLD: 7.48 10*3/MM3 (ref 3.4–10.8)

## 2022-12-21 PROCEDURE — 85025 COMPLETE CBC W/AUTO DIFF WBC: CPT | Performed by: NURSE PRACTITIONER

## 2022-12-21 PROCEDURE — 36415 COLL VENOUS BLD VENIPUNCTURE: CPT | Performed by: NURSE PRACTITIONER

## 2023-01-06 ENCOUNTER — OFFICE VISIT (OUTPATIENT)
Dept: GASTROENTEROLOGY | Facility: CLINIC | Age: 73
End: 2023-01-06
Payer: COMMERCIAL

## 2023-01-06 VITALS
WEIGHT: 226.4 LBS | SYSTOLIC BLOOD PRESSURE: 120 MMHG | DIASTOLIC BLOOD PRESSURE: 85 MMHG | HEART RATE: 101 BPM | BODY MASS INDEX: 33.53 KG/M2 | HEIGHT: 69 IN

## 2023-01-06 DIAGNOSIS — K21.9 GASTROESOPHAGEAL REFLUX DISEASE WITHOUT ESOPHAGITIS: Chronic | ICD-10-CM

## 2023-01-06 PROCEDURE — 99213 OFFICE O/P EST LOW 20 MIN: CPT | Performed by: INTERNAL MEDICINE

## 2023-01-06 RX ORDER — BLOOD-GLUCOSE METER
KIT MISCELLANEOUS
COMMUNITY
Start: 2022-12-14

## 2023-01-06 RX ORDER — OMEPRAZOLE 20 MG/1
20 CAPSULE, DELAYED RELEASE ORAL DAILY
Qty: 90 CAPSULE | Refills: 1 | Status: SHIPPED | OUTPATIENT
Start: 2023-01-06

## 2023-01-06 RX ORDER — FAMOTIDINE 20 MG/1
20 TABLET, FILM COATED ORAL NIGHTLY PRN
Qty: 30 TABLET | Refills: 5 | Status: SHIPPED | OUTPATIENT
Start: 2023-01-06

## 2023-01-06 NOTE — PROGRESS NOTES
[facility]      Chief Complaint:   Chief Complaint   Patient presents with   • Heartburn       Subjective     HPI:   Patient with marked improvement in abdominal pain since starting and H2 blocker at night.  Patient states no longer having nausea during the day.  Patient states the combination of a proton pump inhibitor in the morning and an H2 blocker in the evening works really well.  Patient denies any change in bowel habits or problems with bowel movements.    Plan; we will have patient follow-up in 6 months continue current medication    Past Medical History:   Past Medical History:   Diagnosis Date   • Acute maxillary sinusitis    • Allergic rhinitis      Moderately severe      • Arthritis    • Arthritis    • Atopic conjunctivitis    • Benign prostatic hyperplasia    • Bilateral hearing loss    • Cellulitis     CHESTWAL   • Chronic otitis externa    • Common cold    • Diabetes (HCC)    • Diverticular disease of colon    • Dyspnea    • Essential hypertension    • Fracture of pelvis (HCC)    • Fracture of wrist    • Fracture, radius    • Fracture, ulna    • GERD (gastroesophageal reflux disease)    • GERD (gastroesophageal reflux disease)    • Hiatal hernia    • Hiatal hernia    • Hyperlipidemia    • Hyperlipidemia    • Hypertension    • Hypertension    • Hypertensive disorder    • Immunization due    • Inflamed seborrheic keratosis    • Influenza vaccination given     Influenza vaccination   12/10/2013   • Low back pain    • Need for immunization against influenza    • Need for prophylactic vaccination against Streptococcus pneumoniae (pneumococcus)    • Need for prophylactic vaccination with combined diphtheria-tetanus-pertussis (DTP) vaccine     Need for prophylactic vaccination and inoculation against Diptheria-tetanus-pertussis, combined [DTP] [DTaP]     • Nuclear senile cataract     moderate   • Onychomycosis    • Osteoarthritis of multiple joints    • Primary  open angle glaucoma     Right eye IOP too high s/p LT      • Primary osteoarthritis of both knees    • Rhinitis    • Screening for malignant neoplasm of colon    • Screening for malignant neoplasm of prostate    • Shoulder pain    • Temporomandibular joint disorder    • Vitamin D deficiency        Past Surgical History:  Past Surgical History:   Procedure Laterality Date   • BACK SURGERY     • BACK SURGERY  1982    Low back disk surgery    • CARDIAC CATHETERIZATION  02/08/2011    Symptoms of angina that have resolved with normalization of blood pressure. Very mild nonobstructive coronary artery disease. Normal systolic function.   • COLONOSCOPY N/A 9/19/2022    Procedure: COLONOSCOPY;  Surgeon: James Lynne MD;  Location: [facility] ENDOSCOPY;  Service: Gastroenterology;  Laterality: N/A;   • CRYOTHERAPY  06/07/2013    CRYOTHERAPY OF ACNE   • ENDOSCOPY N/A 9/19/2022    Procedure: ESOPHAGOGASTRODUODENOSCOPY;  Surgeon: James Lynne MD;  Location: [facility] ENDOSCOPY;  Service: Gastroenterology;  Laterality: N/A;   • ENDOSCOPY AND COLONOSCOPY  01/16/2012    x2, Diverticulosis in sigmoid colon. Hemorrhoids in anus.   • ENDOSCOPY W/ PEG TUBE PLACEMENT  04/12/2002    EGD w/ tube: Dysphagia, Esophagitis, Hiatal hernia, Duodenitis.   • INCISION AND DRAINAGE ABSCESS  04/10/2013    I&D, Simple    • INJECTION OF MEDICATION  05/25/2016    Kenalog x3   • KNEE SURGERY     • KNEE SURGERY  05/12/2003    x3, Arthroscopic medial meniscectomy, right knee.   • OTHER SURGICAL HISTORY  09/11/1996    Forearm or wrist surgery: x2, Removal of K-wires, left radius    • OTHER SURGICAL HISTORY  04/25/1986    Hand/finger surgery: Repair radial collateral ligament, I&D, right fifth finger. Repair laceration.   • PROCTOSIGMOIDOSCOPY  04/23/1982    Proctosigmoidoscopy, Rigid: Normal   • REFRACTIVE SURGERY  12/29/2014    LASER SURGERY OF EYE: x2   • WRIST SURGERY         Family History:  Family History   Problem Relation Age of Onset   • Diabetes  Mother    • Heart disease Mother 60        MI   • Arthritis Mother    • Hypertension Mother    • Hyperlipidemia Mother    • Kidney disease Mother    • Thyroid disease Mother    • Diabetes Father    • Heart disease Father    • Arthritis Father    • Hypertension Father    • Hyperlipidemia Father    • Stroke Father 80   • Heart disease Brother 40        cabg   • Diabetes Other    • Heart disease Other    • Hypertension Other    • Cancer Neg Hx        Social History:   reports that he has quit smoking. He has never used smokeless tobacco. He reports current alcohol use. He reports that he does not use drugs.    Medications:   Prior to Admission medications    Medication Sig Start Date End Date Taking? Authorizing Provider   Blood Glucose Monitoring Suppl (FreeStyle Lite) w/Device kit  12/14/22  Yes Pedro Lira MD   famotidine (Pepcid) 20 MG tablet Take 1 tablet by mouth At Night As Needed for Heartburn. 1/6/23  Yes James Lynne MD   finasteride (PROSCAR) 5 MG tablet TAKE 1 TABLET  DAILY 12/13/22  Yes Blanco Drummond MD   fluocinolone (SYNALAR) 0.01 % external solution 3 drops each ear twice a day for 7 days as needed for itching 4/28/22  Yes Mack Yeager MD   glucose blood test strip Qd and prn 12/14/22  Yes Blanco Drummond MD   glucose monitor monitoring kit Use daily and as needed 12/14/22  Yes Blanco Drummond MD   Lancets (freestyle) lancets 1 daily and as needed 12/14/22  Yes Blanco Drummond MD   meloxicam (MOBIC) 15 MG tablet Use qd prn arth pain only 6/14/22  Yes Blanco Drummond MD   metFORMIN ER (Glucophage XR) 500 MG 24 hr tablet Take 1 tablet by mouth Daily With Breakfast. For sugar 12/14/22  Yes Blanco Drummond MD   montelukast (SINGULAIR) 10 MG tablet TAKE 1 TABLET EVERY NIGHT AT BEDTIME. 12/13/22  Yes Blanco Drummond MD   omeprazole (priLOSEC) 20 MG capsule Take 1 capsule by mouth Daily. 1/6/23  Yes James Lynne MD   pravastatin (PRAVACHOL) 40 MG tablet Take 1 tablet by mouth Daily.  12/14/22  Yes Blanco Drummond MD   tamsulosin (FLOMAX) 0.4 MG capsule 24 hr capsule TAKE 1 TO 2 CAPSULES DAILY FOR PROSTATE. 11/4/22  Yes Blanco Drummond MD   triamterene-hydrochlorothiazide (MAXZIDE-25) 37.5-25 MG per tablet TAKE 1 TABLET DAILY FOR BLOOD PRESSURE. 12/13/22  Yes Blanco Drummond MD   famotidine (Pepcid) 20 MG tablet Take 1 tablet by mouth At Night As Needed for Heartburn. 9/30/22 1/6/23 Yes James Lynne MD   omeprazole (priLOSEC) 20 MG capsule TAKE 1 CAPSULE DAILY AS NEEDED FOR REFLUX 12/13/22 1/6/23 Yes Blanco Drummond MD       Allergies:  Penicillins and Wasp venom    ROS:    Review of Systems   Constitutional: Negative for activity change, appetite change and unexpected weight change.   HENT: Negative for congestion, sore throat and trouble swallowing.    Respiratory: Negative for cough, choking and shortness of breath.    Cardiovascular: Negative for chest pain.   Gastrointestinal: Negative for abdominal distention, abdominal pain, anal bleeding, blood in stool, constipation, diarrhea, nausea, rectal pain and vomiting.   Endocrine: Negative for heat intolerance, polydipsia and polyphagia.   Genitourinary: Negative for difficulty urinating.   Musculoskeletal: Negative for arthralgias.   Skin: Negative for color change, pallor, rash and wound.   Allergic/Immunologic: Negative for food allergies.   Neurological: Negative for dizziness, syncope, weakness and headaches.   Psychiatric/Behavioral: Negative for agitation, behavioral problems, confusion and decreased concentration.     Objective     Blood pressure 120/85, pulse 101, height 175.3 cm (69\"), weight 103 kg (226 lb 6.4 oz).    Physical Exam  Constitutional:       General: He is not in acute distress.     Appearance: He is well-developed. He is not diaphoretic.   HENT:      Head: Normocephalic and atraumatic.   Cardiovascular:      Rate and Rhythm: Normal rate and regular rhythm.      Heart sounds: Normal heart sounds. No murmur heard.    No  friction rub. No gallop.   Pulmonary:      Effort: No respiratory distress.      Breath sounds: Normal breath sounds. No wheezing or rales.   Chest:      Chest wall: No tenderness.   Abdominal:      General: Bowel sounds are normal. There is no distension.      Palpations: Abdomen is soft. There is no mass.      Tenderness: There is no abdominal tenderness. There is no guarding or rebound.      Hernia: No hernia is present.   Musculoskeletal:         General: Normal range of motion.   Skin:     General: Skin is warm and dry.      Coloration: Skin is not pale.      Findings: No erythema or rash.   Neurological:      Mental Status: He is alert and oriented to person, place, and time.   Psychiatric:         Behavior: Behavior normal.         Thought Content: Thought content normal.         Judgment: Judgment normal.          Assessment & Plan   Diagnoses and all orders for this visit:    1. Gastroesophageal reflux disease without esophagitis  -     omeprazole (priLOSEC) 20 MG capsule; Take 1 capsule by mouth Daily.  Dispense: 90 capsule; Refill: 1    Other orders  -     famotidine (Pepcid) 20 MG tablet; Take 1 tablet by mouth At Night As Needed for Heartburn.  Dispense: 30 tablet; Refill: 5        * Surgery not found *     Diagnosis Plan   1. Gastroesophageal reflux disease without esophagitis  omeprazole (priLOSEC) 20 MG capsule          Anticipated Surgical Procedure:  No orders of the defined types were placed in this encounter.      The risks, benefits, and alternatives of this procedure have been discussed with the patient or the responsible party- the patient understands and agrees to proceed.

## 2023-01-27 ENCOUNTER — DOCUMENTATION (OUTPATIENT)
Dept: NUTRITION | Facility: HOSPITAL | Age: 73
End: 2023-01-27
Payer: COMMERCIAL

## 2023-01-27 NOTE — PROGRESS NOTES
"Nutrition Services    Patient Name:  Kamlesh Moser  YOB: 1950  MRN: [id number]  Admit Date:  (Not on file)    Spoke with pt by phone . He said he is \"watching my carbs, watching my sugars\" and his blood sugars are much better. He is working with a lady from [employer] he reports. He doesn't want to come to see RDN since he feels he is doing well. Will make provider aware.    Electronically signed by:  Lizette South RD  01/27/23 10:25 CST   "

## 2023-02-02 ENCOUNTER — OFFICE VISIT (OUTPATIENT)
Dept: OTOLARYNGOLOGY | Facility: CLINIC | Age: 73
End: 2023-02-02
Payer: COMMERCIAL

## 2023-02-02 VITALS — BODY MASS INDEX: 32.73 KG/M2 | TEMPERATURE: 97.4 F | WEIGHT: 221 LBS | HEIGHT: 69 IN

## 2023-02-02 DIAGNOSIS — H60.63 CHRONIC NON-INFECTIVE OTITIS EXTERNA OF BOTH EARS, UNSPECIFIED TYPE: Primary | ICD-10-CM

## 2023-02-02 PROCEDURE — 99213 OFFICE O/P EST LOW 20 MIN: CPT | Performed by: OTOLARYNGOLOGY

## 2023-02-02 RX ORDER — TERBINAFINE HYDROCHLORIDE 250 MG/1
TABLET ORAL
COMMUNITY
Start: 2023-01-13

## 2023-02-02 NOTE — PROGRESS NOTES
Subjective   Kamlesh Moser is a 72 y.o. male.       History of Present Illness     Hearing aid wearer with a history of chronic otitis externa.  Suspects his ears need cleaning particularly on the left    The following portions of the patient's history were reviewed and updated as appropriate: allergies, current medications, past family history, past medical history, past social history, past surgical history and problem list.     reports that he has quit smoking. He has never used smokeless tobacco. He reports current alcohol use. He reports that he does not use drugs.   Patient is not a tobacco user and has not been counseled for use of tobacco products      Review of Systems        Objective   Physical Exam  Left greater than right ear canal show uninfected squamous debris that is cleaned under the microscope.  Beyond this tympanic membranes are intact no infection or effusion         Assessment and Plan   Diagnoses and all orders for this visit:    1. Chronic non-infective otitis externa of both ears, unspecified type (Primary)               Plan: Ears cleaned as described above.  Return 9 months.  Call for problems.

## 2023-04-14 ENCOUNTER — OFFICE VISIT (OUTPATIENT)
Dept: FAMILY MEDICINE CLINIC | Facility: CLINIC | Age: 73
End: 2023-04-14
Payer: COMMERCIAL

## 2023-04-14 VITALS
WEIGHT: 216.8 LBS | SYSTOLIC BLOOD PRESSURE: 141 MMHG | HEIGHT: 69 IN | DIASTOLIC BLOOD PRESSURE: 81 MMHG | BODY MASS INDEX: 32.11 KG/M2

## 2023-04-14 DIAGNOSIS — E11.9 TYPE 2 DIABETES MELLITUS WITHOUT COMPLICATION, WITHOUT LONG-TERM CURRENT USE OF INSULIN: Primary | Chronic | ICD-10-CM

## 2023-04-14 DIAGNOSIS — M17.0 PRIMARY OSTEOARTHRITIS OF BOTH KNEES: Chronic | ICD-10-CM

## 2023-04-14 DIAGNOSIS — I10 PRIMARY HYPERTENSION: Chronic | ICD-10-CM

## 2023-04-14 DIAGNOSIS — E78.00 PURE HYPERCHOLESTEROLEMIA: Chronic | ICD-10-CM

## 2023-04-14 LAB
EXPIRATION DATE: NORMAL
HBA1C MFR BLD: 6.2 %
Lab: 30

## 2023-04-14 NOTE — PROGRESS NOTES
Subjective   Kamlesh Moser is a 72 y.o. male.  Reevaluation new onset type 2 diabetes hypertension obesity chronic knee pain diagnoses below.  In the interim went to [facility] with symptoms of dyspnea.  Studies were not to the level of treatable COPD however.  Oxygen saturation apparently was not low enough.  We have counseled be deferring to lung clinic for same.  Is also awaiting records to get the knee replacement as mentioned.  We have counseled that this will need to come from is the niece surgeon for prior to surgery for clearance.  A1c holding steady today on diet and exercise at 6.2.  Has electively lost some weight.    History of Present Illness   HPI    The following portions of the patient's history were reviewed and updated as appropriate: allergies, current medications, past family history, past medical history, past social history, past surgical history and problem list.    Review of Systems  Review of Systems   Constitutional: Negative for activity change, appetite change, fatigue and unexpected weight change.   HENT: Negative for trouble swallowing and voice change.    Eyes: Negative for redness and visual disturbance.   Respiratory: Negative for cough and wheezing.    Cardiovascular: Negative for chest pain and palpitations.   Gastrointestinal: Negative for abdominal pain, constipation, diarrhea, nausea and vomiting.   Genitourinary: Negative for urgency.   Musculoskeletal: Positive for arthralgias. Negative for joint swelling.   Neurological: Negative for syncope and headaches.   Hematological: Negative for adenopathy.   Psychiatric/Behavioral: Negative for sleep disturbance.       Objective   Physical Exam  Physical Exam  Constitutional:       Appearance: He is well-developed.   HENT:      Head: Normocephalic.   Eyes:      Pupils: Pupils are equal, round, and reactive to light.   Neck:      Thyroid: No thyromegaly.   Cardiovascular:      Rate and Rhythm: Normal rate and regular  "rhythm.      Heart sounds: Normal heart sounds. No murmur heard.    No friction rub. No gallop.   Pulmonary:      Breath sounds: Normal breath sounds.   Abdominal:      General: There is no distension.      Palpations: Abdomen is soft. There is no mass.      Tenderness: There is no abdominal tenderness.   Musculoskeletal:         General: Normal range of motion.      Cervical back: Normal range of motion.      Comments: No edema 1-2+ pulses get up and go 3 to 5 seconds   Skin:     General: Skin is warm and dry.   Neurological:      Mental Status: He is alert and oriented to person, place, and time.      Deep Tendon Reflexes: Reflexes are normal and symmetric.   Psychiatric:         Attention and Perception: Attention normal.         Mood and Affect: Mood normal.         Speech: Speech normal.         Behavior: Behavior is cooperative.      Comments: Moderate hard of hearing         Results for orders placed or performed in visit on 04/14/23   POC Glycosylated Hemoglobin (Hb A1C)    Specimen: Blood   Result Value Ref Range    Hemoglobin A1C 6.2 %    Lot Number 30     Expiration Date 11/24            Visit Vitals  /88   Ht 175.3 cm (69\")   Wt 98.3 kg (216 lb 12.8 oz)   BMI 32.02 kg/m²     Body mass index is 32.02 kg/m².  /81   Ht 175.3 cm (69\")   Wt 98.3 kg (216 lb 12.8 oz)   BMI 32.02 kg/m²       Assessment/Plan   Diagnoses and all orders for this visit:    1. Type 2 diabetes mellitus without complication, without long-term current use of insulin (Primary)  -     POC Glycosylated Hemoglobin (Hb A1C)    2. Primary hypertension    3. Pure hypercholesterolemia    4. Primary osteoarthritis of both knees    Continue good weight loss measures continue dietary change.  Counseled on contacting his orthopedist in regards to clearance for his knees for surgery recheck 4 months A1c here continue other medicines as outlined  "

## 2023-05-19 ENCOUNTER — TRANSCRIBE ORDERS (OUTPATIENT)
Dept: HOME HEALTH SERVICES | Facility: HOME HEALTHCARE | Age: 73
End: 2023-05-19
Payer: COMMERCIAL

## 2023-05-19 DIAGNOSIS — Z96.652 AFTERCARE FOLLOWING LEFT KNEE JOINT REPLACEMENT SURGERY: Primary | ICD-10-CM

## 2023-05-19 DIAGNOSIS — Z47.1 AFTERCARE FOLLOWING LEFT KNEE JOINT REPLACEMENT SURGERY: Primary | ICD-10-CM

## 2023-05-23 ENCOUNTER — HOME CARE VISIT (OUTPATIENT)
Dept: HOME HEALTH SERVICES | Facility: CLINIC | Age: 73
End: 2023-05-23
Payer: MEDICARE

## 2023-05-23 PROCEDURE — G0151 HHCP-SERV OF PT,EA 15 MIN: HCPCS

## 2023-05-24 NOTE — HOME HEALTH
Patient is a 71 y/o  male who was seen for OASIS SOC with physical therapy evaluation on 5/23/23 secondary to L TKA completed by Dr. Grissom on 5/22/23. Patient reporting that he is doing well with only some soreness in leg today. States he has been getting around fairly well and icing and elevating leg as appropriate. Hoping to get back to doing all his regualr tasks soon.     Focus of care:The PT focus of care for this patient will include but but be limited to: bilateral LE strength training to improve functional mobility, preserve muscle tone and increase muscular endurance to help reduce levels of fatigue and improve activity tolerance, gait and transfer training to aid in reduction of fall risk and improve patient safety w/ mobility, balance training to prevent falls over course care and improve functional balance outcome scores, ROM, strengthening, transfers, gait, manual therapy following  L TKA.       PMH includes: Acute maxillary sinusitis Allergic rhinitis Moderately severe Arthritis Arthritis Atopic conjunctivitis Benign prostatic hyperplasia Bilateral hearing loss Cellulitis  Chronic otitis externa Common cold Diabetes Diverticular disease of colon Dyspnea Essential hypertension Fracture of pelvis Fracture of wrist Fracture, radius Fracture, ulna GERD (gastroesophageal reflux disease) GERD (gastroesophageal reflux disease) Hiatal hernia Hiatal hernia Hyperlipidemia Hyperlipidemia Hypertension Hypertension Hypertensive disorder Immunization due Inflamed seborrheic keratosis  Low back pain Need for immunization against influenza Need for prophylactic vaccination against Streptococcus pneumoniae (pneumococcus)  Onychomycosis Osteoarthritis of multiple joints Primary open angle glaucoma Right eye IOP too high s/p LT Primary osteoarthritis of both knees Rhinitis Screening for malignant neoplasm of colon Screening for malignant neoplasm of prostate Shoulder pain Temporomandibular joint disorder Vitamin D  deficiency      Upon physical therapy evalaution, patient presents with strength, transfer, balance, endurance, and gait deficits as noted in physical therapy assessment. These deficits are limiting patient's overall functional capacity w/ independence, activity, ADLs, and IADLs. Patient will benefit from skilled physical therapy to address the defcitis noted above and recorded over course of care to allow patient to return to PLOF and max level of independence.    Patient present w/ saturated aquacel dressing upon admission requiring dressing change this session. PT able to remove dressing and appeared all dreid blood was present. Upon cleaning wound with wound , PT noted some active bleeding at distal end of insicion. xeroform and gauze island dressing was applied w/ small area of blood noted on dressing but appeared to be controlled with dressing. Continued with examination and after 15 minutes PT noted bleeding on patient's pant leg below incision site. Patient reexamined dressing and distal end was saturated in blood with additional blood running down patient's leg. Spoke with Chiara Baez RN in office to advise on how to proceed. Chiara Baez phoned Dr. Grissom's office who informed patient to come to office to be seen d/t the continued active blleding being uncontrolled by dressing. PT applied ACE bandage over distal area of inscision to help apply pressure and reduce bleeding. Patient reported that he was electing to go to ER to have incision addressed d/t Dr. Grissom's office closing before he would be able to make trip today. PT notified Suhas Baez RN who then forwarded information to Dr. Grissom's office.

## 2023-05-25 ENCOUNTER — HOME CARE VISIT (OUTPATIENT)
Dept: HOME HEALTH SERVICES | Facility: CLINIC | Age: 73
End: 2023-05-25
Payer: MEDICARE

## 2023-05-25 VITALS
HEART RATE: 96 BPM | OXYGEN SATURATION: 96 % | RESPIRATION RATE: 18 BRPM | TEMPERATURE: 98.8 F | SYSTOLIC BLOOD PRESSURE: 178 MMHG | DIASTOLIC BLOOD PRESSURE: 76 MMHG

## 2023-05-25 PROCEDURE — G0157 HHC PT ASSISTANT EA 15: HCPCS

## 2023-05-25 NOTE — HOME HEALTH
pt in recliner w/ L LE elevated and reports improved pain and mobilities in home; pt request to take a shower after this Rx and request pt/spouse ed on donning bandage after shower later this date; signee removed existing bandage w/ aseptic technique and pt/spouse ed on cleaning and placing Xeroform and new bandage; pt/spouse expressed good understanding of education on dressing placement

## 2023-05-26 ENCOUNTER — HOME CARE VISIT (OUTPATIENT)
Dept: HOME HEALTH SERVICES | Facility: CLINIC | Age: 73
End: 2023-05-26
Payer: MEDICARE

## 2023-05-26 VITALS
HEART RATE: 84 BPM | DIASTOLIC BLOOD PRESSURE: 70 MMHG | SYSTOLIC BLOOD PRESSURE: 124 MMHG | OXYGEN SATURATION: 97 % | RESPIRATION RATE: 18 BRPM | TEMPERATURE: 98.4 F

## 2023-05-26 VITALS
HEART RATE: 90 BPM | OXYGEN SATURATION: 98 % | RESPIRATION RATE: 20 BRPM | SYSTOLIC BLOOD PRESSURE: 140 MMHG | TEMPERATURE: 98.6 F | DIASTOLIC BLOOD PRESSURE: 68 MMHG

## 2023-05-26 PROCEDURE — G0157 HHC PT ASSISTANT EA 15: HCPCS

## 2023-05-26 NOTE — CASE COMMUNICATION
"Please forward to = Dr. Bar Sorenson  / Case Conference Note  Medical Necessity and focus of care:  Patient is a 73 y/o  male who was seen for OASIS SOC with physical therapy evaluation on 5/23/23 secondary to L TKA completed by Dr. Grissom on 5/22/23. Patient reporting that he is doing well with only some soreness in leg today. States he has been getting around fairly well and icing and elevating leg as appropriate. Hoping to  get back to doing all his regualr tasks soon.  PT focus of care for this patient will include but but be limited to: bilateral LE strength training to improve functional mobility, preserve muscle tone and increase muscular endurance to help reduce levels of fatigue and improve activity tolerance, gait and transfer training to aid in reduction of fall risk and improve patient safety w/ mobility, balance training to prevent falls over cou rse care and improve functional balance outcome scores, ROM, strengthening, transfers, gait, manual therapy following  L TKA.   Caregiver Status: lives with spouse  Patient's goal(s): \"get stronger, get back to driving, have no pain in knee\"  GG Discharge Goal: 5  Medication [place]: n/a   Environmental/ Physical issues: ramp to enter and exit home, cluttered walkways  Any additional needs: n/a    Based upon record review and collaboratio n conference, the recommended frequency for this patient is   SN-----  PT-----3w2, 1w2  OT----  ST-----  HHA---  MSW---  Provider___Dr. Grissom_______ Notified @ __5/23/23_______ and agrees with POC     Please verify your eval  scores: (Answer the scores  that pertain to your area of focus remove the others)      3   2        M 1700      Patient present w/ saturated aquacel dressing upon admission requi ring dressing change this session. PT able to remove dressing and appeared all dreid blood was present. Upon cleaning wound with wound , PT noted some active bleeding at distal " end of insicion. xeroform and gauze island dressing was applied w/ small area of blood noted on dressing but appeared to be controlled with dressing. Continued with examination and after 15 minutes PT noted bleeding on patient's pant leg below incision sit e. Patient reexamined dressing and distal end was saturated in blood with additional blood running down patient's leg. Spoke with Chiara Baez RN in office to advise on how to proceed. Chiara Baez phoned Dr. Grissom's office who informed patient to come to office to be seen d/t the continued active blleding being uncontrolled by dressing. PT applied ACE bandage over distal area of inscision to help apply pressure and reduce bleeding. Patie nt reported that he was electing to go to ER to have incision addressed d/t Dr. Grissom's office closing before he would be able to make trip today. PT notified Suhas Baez RN who then forwarded information to Dr. Grissom's office.

## 2023-05-26 NOTE — HOME HEALTH
pt reports slept good last night and felt better since being able to take a shower yesterday; pt reports still wanting to do more amb. w/o AAD but is agreable to contiune use for safety

## 2023-05-30 ENCOUNTER — HOME CARE VISIT (OUTPATIENT)
Dept: HOME HEALTH SERVICES | Facility: CLINIC | Age: 73
End: 2023-05-30
Payer: MEDICARE

## 2023-05-30 PROCEDURE — G0157 HHC PT ASSISTANT EA 15: HCPCS

## 2023-05-31 ENCOUNTER — HOME CARE VISIT (OUTPATIENT)
Dept: HOME HEALTH SERVICES | Facility: CLINIC | Age: 73
End: 2023-05-31
Payer: MEDICARE

## 2023-05-31 VITALS
RESPIRATION RATE: 18 BRPM | DIASTOLIC BLOOD PRESSURE: 64 MMHG | SYSTOLIC BLOOD PRESSURE: 162 MMHG | OXYGEN SATURATION: 97 % | TEMPERATURE: 98.4 F | HEART RATE: 88 BPM

## 2023-05-31 VITALS
SYSTOLIC BLOOD PRESSURE: 160 MMHG | OXYGEN SATURATION: 98 % | DIASTOLIC BLOOD PRESSURE: 86 MMHG | TEMPERATURE: 98.4 F | RESPIRATION RATE: 18 BRPM | HEART RATE: 96 BPM

## 2023-05-31 PROCEDURE — G0157 HHC PT ASSISTANT EA 15: HCPCS

## 2023-05-31 NOTE — HOME HEALTH
pt up amb. in home w/ RW and reports feels better when up and moving; pt reports increased activities outside home this weekend w/ trip to [place] and local restaurant; pt reports bandage was changed on 5/30/23 and will complete next bandage change on 6/1/23; pt/spouse confirmed Ind. w/ bandage change

## 2023-06-02 ENCOUNTER — HOME CARE VISIT (OUTPATIENT)
Dept: HOME HEALTH SERVICES | Facility: CLINIC | Age: 73
End: 2023-06-02
Payer: MEDICARE

## 2023-06-02 VITALS
TEMPERATURE: 98.4 F | SYSTOLIC BLOOD PRESSURE: 168 MMHG | DIASTOLIC BLOOD PRESSURE: 80 MMHG | OXYGEN SATURATION: 97 % | HEART RATE: 100 BPM | RESPIRATION RATE: 18 BRPM

## 2023-06-02 PROCEDURE — G0157 HHC PT ASSISTANT EA 15: HCPCS

## 2023-06-02 NOTE — HOME HEALTH
pt up amb. un home w/ RW and reports already completed HEP this am; pt reports has scheduled appointment w/ Ortho MD for staples removal on 6/6/23

## 2023-06-07 ENCOUNTER — HOME CARE VISIT (OUTPATIENT)
Dept: HOME HEALTH SERVICES | Facility: CLINIC | Age: 73
End: 2023-06-07
Payer: MEDICARE

## 2023-06-07 VITALS
RESPIRATION RATE: 18 BRPM | OXYGEN SATURATION: 96 % | DIASTOLIC BLOOD PRESSURE: 80 MMHG | TEMPERATURE: 99 F | HEART RATE: 90 BPM | SYSTOLIC BLOOD PRESSURE: 134 MMHG

## 2023-06-07 PROCEDURE — G0157 HHC PT ASSISTANT EA 15: HCPCS

## 2023-06-07 NOTE — HOME HEALTH
pt reports had scheduled appointment w/ Ortho MD on 6/6/23 for staples removal and added antibiotic; pt reports scheduled to go back on 6/13/23

## 2023-06-07 NOTE — Clinical Note
pt scheduled/ready for D/C [place] by Physical Therapist next visit, per pts plan of care  pt ed on scheduled D/C from  PT services next week, and medicare 2 day notice signed; pts copy left in pts folder

## 2023-08-21 ENCOUNTER — OFFICE VISIT (OUTPATIENT)
Dept: FAMILY MEDICINE CLINIC | Facility: CLINIC | Age: 73
End: 2023-08-21
Payer: MEDICARE

## 2023-08-21 VITALS
OXYGEN SATURATION: 98 % | BODY MASS INDEX: 31.07 KG/M2 | HEART RATE: 78 BPM | SYSTOLIC BLOOD PRESSURE: 135 MMHG | DIASTOLIC BLOOD PRESSURE: 84 MMHG | HEIGHT: 69 IN | WEIGHT: 209.8 LBS

## 2023-08-21 DIAGNOSIS — M15.9 PRIMARY OSTEOARTHRITIS INVOLVING MULTIPLE JOINTS: Primary | Chronic | ICD-10-CM

## 2023-08-21 DIAGNOSIS — I10 PRIMARY HYPERTENSION: Chronic | ICD-10-CM

## 2023-08-21 DIAGNOSIS — Z12.5 SCREENING PSA (PROSTATE SPECIFIC ANTIGEN): ICD-10-CM

## 2023-08-21 DIAGNOSIS — Z00.00 MEDICARE ANNUAL WELLNESS VISIT, SUBSEQUENT: ICD-10-CM

## 2023-08-21 DIAGNOSIS — E66.09 CLASS 1 OBESITY DUE TO EXCESS CALORIES WITH SERIOUS COMORBIDITY AND BODY MASS INDEX (BMI) OF 31.0 TO 31.9 IN ADULT: Chronic | ICD-10-CM

## 2023-08-21 DIAGNOSIS — Z96.652 TOTAL KNEE REPLACEMENT STATUS, LEFT: Chronic | ICD-10-CM

## 2023-08-21 DIAGNOSIS — E11.9 TYPE 2 DIABETES MELLITUS WITHOUT COMPLICATION, WITHOUT LONG-TERM CURRENT USE OF INSULIN: Chronic | ICD-10-CM

## 2023-08-21 DIAGNOSIS — E78.00 PURE HYPERCHOLESTEROLEMIA: Chronic | ICD-10-CM

## 2023-08-21 LAB
EXPIRATION DATE: ABNORMAL
HBA1C MFR BLD: 5.9 %
Lab: 603

## 2023-08-21 NOTE — PROGRESS NOTES
Subjective   Kamlesh Moser is a 72 y.o. male.  Patient diabetes hypertension mild hyperlipidemia obesity DJD diagnoses below in the interim has had a left knee replacement in the summer.  Is lost weight due to increased activity feels well feels much better with a new knee.  Is waiting to get the right one replaced as well.  A1c is dropped from higher down to 5.9.  Has had an eye exam.  Is up-to-date on all other.    History of Present Illness   HPI    The following portions of the patient's history were reviewed and updated as appropriate: allergies, current medications, past family history, past medical history, past social history, past surgical history, and problem list.    Review of Systems  Review of Systems   Constitutional:  Negative for activity change, appetite change, fatigue and unexpected weight change.   HENT:  Negative for trouble swallowing and voice change.    Eyes:  Negative for redness and visual disturbance.   Respiratory:  Negative for cough and wheezing.    Cardiovascular:  Negative for chest pain and palpitations.   Gastrointestinal:  Negative for abdominal pain, constipation, diarrhea, nausea and vomiting.   Genitourinary:  Negative for urgency.   Musculoskeletal:  Positive for arthralgias. Negative for joint swelling.   Neurological:  Negative for syncope and headaches.   Hematological:  Negative for adenopathy.   Psychiatric/Behavioral:  Negative for sleep disturbance.      Objective   Physical Exam  Physical Exam  Constitutional:       Appearance: He is well-developed.   HENT:      Head: Normocephalic.   Eyes:      Pupils: Pupils are equal, round, and reactive to light.   Neck:      Thyroid: No thyromegaly.   Cardiovascular:      Rate and Rhythm: Normal rate and regular rhythm.      Heart sounds: Normal heart sounds. No murmur heard.    No friction rub. No gallop.   Pulmonary:      Breath sounds: Normal breath sounds.   Abdominal:      General: There is no distension.       "Palpations: Abdomen is soft. There is no mass.      Tenderness: There is no abdominal tenderness.   Musculoskeletal:         General: Normal range of motion.      Cervical back: Normal range of motion.      Comments: Get up and go 3 to 5 seconds mildly antalgic gait no edema 1-2+ pulses   Skin:     General: Skin is warm and dry.   Neurological:      Mental Status: He is alert and oriented to person, place, and time.      Deep Tendon Reflexes: Reflexes are normal and symmetric.   Psychiatric:         Mood and Affect: Mood normal.         Speech: Speech normal.         Behavior: Behavior normal.         Thought Content: Thought content normal.         Cognition and Memory: Cognition normal.      Comments: Mild hard of hearing     Results for orders placed or performed in visit on 08/21/23   POC Glycosylated Hemoglobin (Hb A1C)    Specimen: Blood   Result Value Ref Range    Hemoglobin A1C 5.9 %    Lot Number [id number]     Expiration Date 6/2,025          Visit Vitals  /84   Pulse 78   Ht 175.3 cm (69\")   Wt 95.2 kg (209 lb 12.8 oz)   SpO2 98%   BMI 30.98 kg/mý     Body mass index is 30.98 kg/mý.    /84   Pulse 78   Ht 175.3 cm (69\")   Wt 95.2 kg (209 lb 12.8 oz)   SpO2 98%   BMI 30.98 kg/mý     Assessment/Plan   Diagnoses and all orders for this visit:    1. Type 2 diabetes mellitus without complication, without long-term current use of insulin (Primary)  -     POC Glycosylated Hemoglobin (Hb A1C)  -     Comprehensive Metabolic Panel; Future  -     Hemoglobin A1c; Future  -     Lipid Panel With LDL / HDL Ratio; Future  -     MicroAlbumin, Urine, Random - Urine, Clean Catch; Future  -     Magnesium; Future    2. Primary hypertension  -     Comprehensive Metabolic Panel; Future  -     Magnesium; Future    3. Pure hypercholesterolemia  -     Lipid Panel With LDL / HDL Ratio; Future    4. Medicare annual wellness visit, subsequent    5. Screening PSA (prostate specific antigen)  -     PSA Screen; Future    6. " Class 1 obesity due to excess calories with serious comorbidity and body mass index (BMI) of 31.0 to 31.9 in adult      Continue good weight loss measures.  Counseled on increasing hydration counseled on fall prevention as always continue medicines as outlined.  Recheck 4 months all lab priorCounsel on wt loss measures and programs

## 2023-08-21 NOTE — PROGRESS NOTES
The ABCs of the Annual Wellness Visit  Subsequent Medicare Wellness Visit    Subjective      Kamlesh Moser is a 72 y.o. male who presents for a Subsequent Medicare Wellness Visit.    The following portions of the patient's history were reviewed and   updated as appropriate: allergies, current medications, past family history, past medical history, past social history, past surgical history, and problem list.    Compared to one year ago, the patient feels his physical   health is better.    Compared to one year ago, the patient feels his mental   health is the same.    Recent Hospitalizations:  He was admitted within the past 365 days at outside hospital for knee replacement hospital.       Current Medical Providers:  Patient Care Team:  Blanco Drummond MD as PCP - General (Family Medicine)  Mack Yeager MD as Surgeon (Otolaryngology)    Outpatient Medications Prior to Visit   Medication Sig Dispense Refill    aspirin 325 MG tablet Take 1 tablet by mouth Daily.      Blood Glucose Monitoring Suppl (FreeStyle Lite) w/Device kit       famotidine (Pepcid) 20 MG tablet Take 1 tablet by mouth At Night As Needed for Heartburn. 30 tablet 5    finasteride (PROSCAR) 5 MG tablet TAKE 1 TABLET  DAILY 90 tablet 3    fluocinolone (SYNALAR) 0.01 % external solution 3 drops each ear twice a day for 7 days as needed for itching 60 mL 1    gabapentin (NEURONTIN) 300 MG capsule Take 1 capsule by mouth As Needed.      glucose blood test strip Qd and prn 100 each 3    glucose monitor monitoring kit Use daily and as needed 1 each 5    HYDROcodone-acetaminophen (NORCO) 5-325 MG per tablet Take 1 tablet by mouth Every 6 (Six) Hours As Needed for Moderate Pain.      Lancets (freestyle) lancets 1 daily and as needed 10 each 3    meloxicam (MOBIC) 15 MG tablet TAKE 1 TABLET  EVERY DAY AS NEEDED. FOR ARTHRITIS PAIN ONLY 60 tablet 5    metFORMIN ER (Glucophage XR) 500 MG 24 hr tablet Take 1 tablet by mouth Daily With Breakfast.  For sugar 90 tablet 3    montelukast (SINGULAIR) 10 MG tablet TAKE 1 TABLET EVERY NIGHT AT BEDTIME. 90 tablet 3    omeprazole (priLOSEC) 20 MG capsule Take 1 capsule by mouth Daily. 90 capsule 5    pravastatin (PRAVACHOL) 40 MG tablet Take 1 tablet by mouth Daily. 90 tablet 3    tamsulosin (FLOMAX) 0.4 MG capsule 24 hr capsule TAKE 1 TO 2 CAPSULES DAILY FOR PROSTATE. 120 capsule 3    terbinafine (lamiSIL) 250 MG tablet       triamterene-hydrochlorothiazide (MAXZIDE-25) 37.5-25 MG per tablet TAKE 1 TABLET DAILY FOR BLOOD PRESSURE. 90 tablet 3     No facility-administered medications prior to visit.       Opioid medication/s are on active medication list.  and I have evaluated his active treatment plan and pain score trends (see table).  Vitals:    08/21/23 0955   PainSc: 0-No pain     I have reviewed the chart for potential of high risk medication and harmful drug interactions in the elderly.          Aspirin is on active medication list. Aspirin use is indicated based on review of current medical condition/s. Pros and cons of this therapy have been discussed today. Benefits of this medication outweigh potential harm.  Patient has been encouraged to continue taking this medication.  .      Patient Active Problem List   Diagnosis    Arthritis    GERD (gastroesophageal reflux disease)    Hyperlipidemia    Hypertension    Primary osteoarthritis of both knees    Primary open angle glaucoma of left eye, mild stage    Cataract    Osteoarthritis of multiple joints    Fatty liver    Gallstones    Benign prostatic hyperplasia with incomplete bladder emptying    Class 1 obesity due to excess calories with serious comorbidity and body mass index (BMI) of 33.0 to 33.9 in adult    Type 2 diabetes mellitus without complication, without long-term current use of insulin    Total knee replacement status, left     Advance Care Planning   Advance Care Planning     Advance Directive is not on file.  ACP discussion was held with the  "patient during this visit. Patient does not have an advance directive, information provided.     Objective    Vitals:    23 0955   BP: 160/78   Pulse: 78   SpO2: 98%   Weight: 95.2 kg (209 lb 12.8 oz)   Height: 175.3 cm (69\")   PainSc: 0-No pain     Estimated body mass index is 30.98 kg/mý as calculated from the following:    Height as of this encounter: 175.3 cm (69\").    Weight as of this encounter: 95.2 kg (209 lb 12.8 oz).           Does the patient have evidence of cognitive impairment?   No            HEALTH RISK ASSESSMENT    Smoking Status:  Social History     Tobacco Use   Smoking Status Former   Smokeless Tobacco Never     Alcohol Consumption:  Social History     Substance and Sexual Activity   Alcohol Use Yes    Comment: social     Fall Risk Screen:    STEADI Fall Risk Assessment was completed, and patient is at LOW risk for falls.Assessment completed on:2023    Depression Screenin/21/2023    10:00 AM   PHQ-2/PHQ-9 Depression Screening   Little Interest or Pleasure in Doing Things 0-->not at all   Feeling Down, Depressed or Hopeless 0-->not at all   PHQ-9: Brief Depression Severity Measure Score 0       Health Habits and Functional and Cognitive Screenin/21/2023    10:00 AM   Functional & Cognitive Status   Do you have difficulty preparing food and eating? No   Do you have difficulty bathing yourself, getting dressed or grooming yourself? No   Do you have difficulty using the toilet? No   Do you have difficulty moving around from place to place? No   Do you have trouble with steps or getting out of a bed or a chair? No   Current Diet Other   Dental Exam Up to date   Eye Exam Up to date   Exercise (times per week) Other   Current Exercises Include Other   Do you need help using the phone?  No   Are you deaf or do you have serious difficulty hearing?  No   Do you need help to go to places out of walking distance? No   Do you need help shopping? No   Do you need help preparing " meals?  No   Do you need help with housework?  No   Do you need help with laundry? No   Do you need help taking your medications? No   Do you need help managing money? No   Do you ever drive or ride in a car without wearing a seat belt? No   Have you felt unusual stress, anger or loneliness in the last month? No   Who do you live with? Spouse   If you need help, do you have trouble finding someone available to you? No   Have you been bothered in the last four weeks by sexual problems? No   Do you have difficulty concentrating, remembering or making decisions? No       Age-appropriate Screening Schedule:  Refer to the list below for future screening recommendations based on patient's age, sex and/or medical conditions. Orders for these recommended tests are listed in the plan section. The patient has been provided with a written plan.    Health Maintenance   Topic Date Due    COVID-19 Vaccine (4 - Pfizer series) 12/29/2021    ANNUAL WELLNESS VISIT  06/14/2023    INFLUENZA VACCINE  10/01/2023    LIPID PANEL  12/08/2023    TDAP/TD VACCINES (2 - Td or Tdap) 06/19/2024    COLORECTAL CANCER SCREENING  09/19/2032    HEPATITIS C SCREENING  Completed    Pneumococcal Vaccine 65+  Completed    AAA SCREEN (ONE-TIME)  Completed    DIABETIC FOOT EXAM  Discontinued    HEMOGLOBIN A1C  Discontinued    DIABETIC EYE EXAM  Discontinued    URINE MICROALBUMIN  Discontinued    ZOSTER VACCINE  Discontinued                  CMS Preventative Services Quick Reference  Risk Factors Identified During Encounter:    Immunizations Discussed/Encouraged: Influenza and COVID19    The above risks/problems have been discussed with the patient.  Pertinent information has been shared with the patient in the After Visit Summary.    Diagnoses and all orders for this visit:    1. Type 2 diabetes mellitus without complication, without long-term current use of insulin (Primary)  -     POC Glycosylated Hemoglobin (Hb A1C)  -     Comprehensive Metabolic Panel;  Future  -     Hemoglobin A1c; Future  -     Lipid Panel With LDL / HDL Ratio; Future  -     MicroAlbumin, Urine, Random - Urine, Clean Catch; Future  -     Magnesium; Future    2. Primary hypertension  -     Comprehensive Metabolic Panel; Future  -     Magnesium; Future    3. Pure hypercholesterolemia  -     Lipid Panel With LDL / HDL Ratio; Future    4. Medicare annual wellness visit, subsequent    5. Screening PSA (prostate specific antigen)  -     PSA Screen; Future        Follow Up:   Next Medicare Wellness visit to be scheduled in 1 year.      An After Visit Summary and PPPS were made available to the patient.

## 2023-09-18 ENCOUNTER — LAB (OUTPATIENT)
Dept: LAB | Facility: HOSPITAL | Age: 73
End: 2023-09-18
Payer: COMMERCIAL

## 2023-09-18 ENCOUNTER — TRANSCRIBE ORDERS (OUTPATIENT)
Dept: LAB | Facility: HOSPITAL | Age: 73
End: 2023-09-18
Payer: COMMERCIAL

## 2023-09-18 DIAGNOSIS — Z79.899 ENCOUNTER FOR LONG-TERM (CURRENT) USE OF OTHER MEDICATIONS: ICD-10-CM

## 2023-09-18 DIAGNOSIS — Z79.899 ENCOUNTER FOR LONG-TERM (CURRENT) USE OF OTHER MEDICATIONS: Primary | ICD-10-CM

## 2023-09-18 PROCEDURE — 85025 COMPLETE CBC W/AUTO DIFF WBC: CPT

## 2023-09-18 PROCEDURE — 36415 COLL VENOUS BLD VENIPUNCTURE: CPT

## 2023-09-18 PROCEDURE — 80053 COMPREHEN METABOLIC PANEL: CPT

## 2023-09-19 LAB
ALBUMIN SERPL-MCNC: 4.4 G/DL (ref 3.5–5.2)
ALBUMIN/GLOB SERPL: 1.8 G/DL
ALP SERPL-CCNC: 56 U/L (ref 39–117)
ALT SERPL W P-5'-P-CCNC: 23 U/L (ref 1–41)
ANION GAP SERPL CALCULATED.3IONS-SCNC: 12.2 MMOL/L (ref 5–15)
AST SERPL-CCNC: 16 U/L (ref 1–40)
BASOPHILS # BLD AUTO: 0.06 10*3/MM3 (ref 0–0.2)
BASOPHILS NFR BLD AUTO: 0.9 % (ref 0–1.5)
BILIRUB SERPL-MCNC: 0.2 MG/DL (ref 0–1.2)
BUN SERPL-MCNC: 14 MG/DL (ref 8–23)
BUN/CREAT SERPL: 14.6 (ref 7–25)
CALCIUM SPEC-SCNC: 9.4 MG/DL (ref 8.6–10.5)
CHLORIDE SERPL-SCNC: 99 MMOL/L (ref 98–107)
CO2 SERPL-SCNC: 24.8 MMOL/L (ref 22–29)
CREAT SERPL-MCNC: 0.96 MG/DL (ref 0.76–1.27)
DEPRECATED RDW RBC AUTO: 38.9 FL (ref 37–54)
EGFRCR SERPLBLD CKD-EPI 2021: 82.4 ML/MIN/1.73
EOSINOPHIL # BLD AUTO: 0.13 10*3/MM3 (ref 0–0.4)
EOSINOPHIL NFR BLD AUTO: 1.9 % (ref 0.3–6.2)
ERYTHROCYTE [DISTWIDTH] IN BLOOD BY AUTOMATED COUNT: 12.7 % (ref 12.3–15.4)
GLOBULIN UR ELPH-MCNC: 2.5 GM/DL
GLUCOSE SERPL-MCNC: 105 MG/DL (ref 65–99)
HCT VFR BLD AUTO: 46.4 % (ref 37.5–51)
HGB BLD-MCNC: 16.3 G/DL (ref 13–17.7)
IMM GRANULOCYTES # BLD AUTO: 0.02 10*3/MM3 (ref 0–0.05)
IMM GRANULOCYTES NFR BLD AUTO: 0.3 % (ref 0–0.5)
LYMPHOCYTES # BLD AUTO: 1.41 10*3/MM3 (ref 0.7–3.1)
LYMPHOCYTES NFR BLD AUTO: 20.7 % (ref 19.6–45.3)
MCH RBC QN AUTO: 30.1 PG (ref 26.6–33)
MCHC RBC AUTO-ENTMCNC: 35.1 G/DL (ref 31.5–35.7)
MCV RBC AUTO: 85.6 FL (ref 79–97)
MONOCYTES # BLD AUTO: 0.5 10*3/MM3 (ref 0.1–0.9)
MONOCYTES NFR BLD AUTO: 7.3 % (ref 5–12)
NEUTROPHILS NFR BLD AUTO: 4.69 10*3/MM3 (ref 1.7–7)
NEUTROPHILS NFR BLD AUTO: 68.9 % (ref 42.7–76)
NRBC BLD AUTO-RTO: 0 /100 WBC (ref 0–0.2)
PLATELET # BLD AUTO: 253 10*3/MM3 (ref 140–450)
PMV BLD AUTO: 9.9 FL (ref 6–12)
POTASSIUM SERPL-SCNC: 4.2 MMOL/L (ref 3.5–5.2)
PROT SERPL-MCNC: 6.9 G/DL (ref 6–8.5)
RBC # BLD AUTO: 5.42 10*6/MM3 (ref 4.14–5.8)
SODIUM SERPL-SCNC: 136 MMOL/L (ref 136–145)
WBC NRBC COR # BLD: 6.81 10*3/MM3 (ref 3.4–10.8)

## (undated) DEVICE — SINGLE-USE BIOPSY FORCEPS: Brand: RADIAL JAW 4

## (undated) DEVICE — BITEBLOCK ENDO W/STRAP 60F A/ LF DISP